# Patient Record
Sex: FEMALE | Race: WHITE | NOT HISPANIC OR LATINO | Employment: UNEMPLOYED | URBAN - METROPOLITAN AREA
[De-identification: names, ages, dates, MRNs, and addresses within clinical notes are randomized per-mention and may not be internally consistent; named-entity substitution may affect disease eponyms.]

---

## 2021-10-05 DIAGNOSIS — Z12.31 ENCOUNTER FOR SCREENING MAMMOGRAM FOR MALIGNANT NEOPLASM OF BREAST: Primary | ICD-10-CM

## 2021-10-07 ENCOUNTER — TELEPHONE (OUTPATIENT)
Dept: GASTROENTEROLOGY | Facility: CLINIC | Age: 66
End: 2021-10-07

## 2021-10-27 ENCOUNTER — HOSPITAL ENCOUNTER (INPATIENT)
Facility: HOSPITAL | Age: 66
LOS: 1 days | Discharge: LEFT BEFORE DISCHARGE | DRG: 101 | End: 2021-10-28
Attending: EMERGENCY MEDICINE | Admitting: INTERNAL MEDICINE
Payer: OTHER GOVERNMENT

## 2021-10-27 DIAGNOSIS — G93.40 ENCEPHALOPATHY ACUTE: ICD-10-CM

## 2021-10-27 DIAGNOSIS — G93.40 ENCEPHALOPATHY: ICD-10-CM

## 2021-10-27 DIAGNOSIS — R41.0 ACUTE DELIRIUM: Primary | ICD-10-CM

## 2021-10-27 LAB
ALBUMIN SERPL BCP-MCNC: 3.9 G/DL (ref 3.5–5.2)
ALP SERPL-CCNC: 74 U/L (ref 55–135)
ALT SERPL W/O P-5'-P-CCNC: 22 U/L (ref 10–44)
AMMONIA PLAS-SCNC: 20 UMOL/L (ref 10–50)
ANION GAP SERPL CALC-SCNC: 11 MMOL/L (ref 8–16)
AST SERPL-CCNC: 27 U/L (ref 10–40)
BASOPHILS # BLD AUTO: 0.07 K/UL (ref 0–0.2)
BASOPHILS NFR BLD: 0.9 % (ref 0–1.9)
BILIRUB SERPL-MCNC: 0.2 MG/DL (ref 0.1–1)
BUN SERPL-MCNC: 16 MG/DL (ref 8–23)
CALCIUM SERPL-MCNC: 9.1 MG/DL (ref 8.7–10.5)
CHLORIDE SERPL-SCNC: 96 MMOL/L (ref 95–110)
CO2 SERPL-SCNC: 22 MMOL/L (ref 23–29)
CREAT SERPL-MCNC: 0.6 MG/DL (ref 0.5–1.4)
DIFFERENTIAL METHOD: ABNORMAL
EOSINOPHIL # BLD AUTO: 0.1 K/UL (ref 0–0.5)
EOSINOPHIL NFR BLD: 1.8 % (ref 0–8)
ERYTHROCYTE [DISTWIDTH] IN BLOOD BY AUTOMATED COUNT: 13.3 % (ref 11.5–14.5)
EST. GFR  (AFRICAN AMERICAN): >60 ML/MIN/1.73 M^2
EST. GFR  (NON AFRICAN AMERICAN): >60 ML/MIN/1.73 M^2
ETHANOL SERPL-MCNC: <10 MG/DL
GLUCOSE SERPL-MCNC: 97 MG/DL (ref 70–110)
HCT VFR BLD AUTO: 35.3 % (ref 37–48.5)
HGB BLD-MCNC: 11.3 G/DL (ref 12–16)
IMM GRANULOCYTES # BLD AUTO: 0.02 K/UL (ref 0–0.04)
IMM GRANULOCYTES NFR BLD AUTO: 0.3 % (ref 0–0.5)
LYMPHOCYTES # BLD AUTO: 1 K/UL (ref 1–4.8)
LYMPHOCYTES NFR BLD: 12.5 % (ref 18–48)
MCH RBC QN AUTO: 30 PG (ref 27–31)
MCHC RBC AUTO-ENTMCNC: 32 G/DL (ref 32–36)
MCV RBC AUTO: 94 FL (ref 82–98)
MONOCYTES # BLD AUTO: 0.5 K/UL (ref 0.3–1)
MONOCYTES NFR BLD: 6.9 % (ref 4–15)
NEUTROPHILS # BLD AUTO: 5.9 K/UL (ref 1.8–7.7)
NEUTROPHILS NFR BLD: 77.6 % (ref 38–73)
NRBC BLD-RTO: 0 /100 WBC
PLATELET # BLD AUTO: 374 K/UL (ref 150–450)
PMV BLD AUTO: 8.6 FL (ref 9.2–12.9)
POTASSIUM SERPL-SCNC: 4.3 MMOL/L (ref 3.5–5.1)
PROT SERPL-MCNC: 7 G/DL (ref 6–8.4)
RBC # BLD AUTO: 3.77 M/UL (ref 4–5.4)
SODIUM SERPL-SCNC: 129 MMOL/L (ref 136–145)
WBC # BLD AUTO: 7.66 K/UL (ref 3.9–12.7)

## 2021-10-27 PROCEDURE — 81003 URINALYSIS AUTO W/O SCOPE: CPT | Mod: 59 | Performed by: EMERGENCY MEDICINE

## 2021-10-27 PROCEDURE — 36415 COLL VENOUS BLD VENIPUNCTURE: CPT | Performed by: EMERGENCY MEDICINE

## 2021-10-27 PROCEDURE — 80307 DRUG TEST PRSMV CHEM ANLYZR: CPT | Performed by: EMERGENCY MEDICINE

## 2021-10-27 PROCEDURE — 82140 ASSAY OF AMMONIA: CPT | Performed by: EMERGENCY MEDICINE

## 2021-10-27 PROCEDURE — 82077 ASSAY SPEC XCP UR&BREATH IA: CPT | Performed by: EMERGENCY MEDICINE

## 2021-10-27 PROCEDURE — 80053 COMPREHEN METABOLIC PANEL: CPT | Performed by: EMERGENCY MEDICINE

## 2021-10-27 PROCEDURE — 99285 EMERGENCY DEPT VISIT HI MDM: CPT | Mod: 25

## 2021-10-27 PROCEDURE — 96374 THER/PROPH/DIAG INJ IV PUSH: CPT

## 2021-10-27 PROCEDURE — 85025 COMPLETE CBC W/AUTO DIFF WBC: CPT | Performed by: EMERGENCY MEDICINE

## 2021-10-27 PROCEDURE — 63600175 PHARM REV CODE 636 W HCPCS: Performed by: EMERGENCY MEDICINE

## 2021-10-27 RX ORDER — LORAZEPAM 2 MG/ML
0.5 INJECTION INTRAMUSCULAR
Status: COMPLETED | OUTPATIENT
Start: 2021-10-27 | End: 2021-10-27

## 2021-10-27 RX ADMIN — LORAZEPAM 0.5 MG: 2 INJECTION INTRAMUSCULAR; INTRAVENOUS at 11:10

## 2021-10-28 VITALS
SYSTOLIC BLOOD PRESSURE: 111 MMHG | OXYGEN SATURATION: 93 % | TEMPERATURE: 97 F | WEIGHT: 101.88 LBS | BODY MASS INDEX: 20 KG/M2 | HEIGHT: 60 IN | DIASTOLIC BLOOD PRESSURE: 71 MMHG | HEART RATE: 76 BPM | RESPIRATION RATE: 18 BRPM

## 2021-10-28 PROBLEM — G93.40 ENCEPHALOPATHY ACUTE: Status: ACTIVE | Noted: 2021-10-28

## 2021-10-28 PROBLEM — E87.1 HYPONATREMIA: Status: ACTIVE | Noted: 2021-10-28

## 2021-10-28 LAB
ALBUMIN SERPL BCP-MCNC: 3.4 G/DL (ref 3.5–5.2)
ALP SERPL-CCNC: 63 U/L (ref 55–135)
ALT SERPL W/O P-5'-P-CCNC: 19 U/L (ref 10–44)
AMMONIA PLAS-SCNC: 28 UMOL/L (ref 10–50)
AMPHET+METHAMPHET UR QL: NEGATIVE
ANION GAP SERPL CALC-SCNC: 9 MMOL/L (ref 8–16)
AST SERPL-CCNC: 23 U/L (ref 10–40)
BARBITURATES UR QL SCN>200 NG/ML: NEGATIVE
BASOPHILS # BLD AUTO: 0.06 K/UL (ref 0–0.2)
BASOPHILS NFR BLD: 0.9 % (ref 0–1.9)
BENZODIAZ UR QL SCN>200 NG/ML: NEGATIVE
BILIRUB SERPL-MCNC: 0.2 MG/DL (ref 0.1–1)
BILIRUB UR QL STRIP: NEGATIVE
BUN SERPL-MCNC: 11 MG/DL (ref 8–23)
BZE UR QL SCN: NEGATIVE
CALCIUM SERPL-MCNC: 8.5 MG/DL (ref 8.7–10.5)
CANNABINOIDS UR QL SCN: NEGATIVE
CHLORIDE SERPL-SCNC: 97 MMOL/L (ref 95–110)
CLARITY UR: CLEAR
CO2 SERPL-SCNC: 22 MMOL/L (ref 23–29)
COLOR UR: YELLOW
CREAT SERPL-MCNC: 0.6 MG/DL (ref 0.5–1.4)
CREAT UR-MCNC: 33 MG/DL (ref 15–325)
DIFFERENTIAL METHOD: ABNORMAL
EOSINOPHIL # BLD AUTO: 0.1 K/UL (ref 0–0.5)
EOSINOPHIL NFR BLD: 1.4 % (ref 0–8)
ERYTHROCYTE [DISTWIDTH] IN BLOOD BY AUTOMATED COUNT: 13.2 % (ref 11.5–14.5)
EST. GFR  (AFRICAN AMERICAN): >60 ML/MIN/1.73 M^2
EST. GFR  (NON AFRICAN AMERICAN): >60 ML/MIN/1.73 M^2
GLUCOSE SERPL-MCNC: 91 MG/DL (ref 70–110)
GLUCOSE UR QL STRIP: NEGATIVE
HCT VFR BLD AUTO: 31.3 % (ref 37–48.5)
HGB BLD-MCNC: 10.2 G/DL (ref 12–16)
HGB UR QL STRIP: ABNORMAL
IMM GRANULOCYTES # BLD AUTO: 0.03 K/UL (ref 0–0.04)
IMM GRANULOCYTES NFR BLD AUTO: 0.4 % (ref 0–0.5)
KETONES UR QL STRIP: NEGATIVE
LEUKOCYTE ESTERASE UR QL STRIP: NEGATIVE
LYMPHOCYTES # BLD AUTO: 1.1 K/UL (ref 1–4.8)
LYMPHOCYTES NFR BLD: 16.1 % (ref 18–48)
MAGNESIUM SERPL-MCNC: 1.8 MG/DL (ref 1.6–2.6)
MCH RBC QN AUTO: 29.7 PG (ref 27–31)
MCHC RBC AUTO-ENTMCNC: 32.6 G/DL (ref 32–36)
MCV RBC AUTO: 91 FL (ref 82–98)
METHADONE UR QL SCN>300 NG/ML: NEGATIVE
MONOCYTES # BLD AUTO: 0.5 K/UL (ref 0.3–1)
MONOCYTES NFR BLD: 6.5 % (ref 4–15)
NEUTROPHILS # BLD AUTO: 5.3 K/UL (ref 1.8–7.7)
NEUTROPHILS NFR BLD: 74.7 % (ref 38–73)
NITRITE UR QL STRIP: NEGATIVE
NRBC BLD-RTO: 0 /100 WBC
OPIATES UR QL SCN: NEGATIVE
PCP UR QL SCN>25 NG/ML: NEGATIVE
PH UR STRIP: 7 [PH] (ref 5–8)
PHOSPHATE SERPL-MCNC: 3.1 MG/DL (ref 2.7–4.5)
PLATELET # BLD AUTO: 346 K/UL (ref 150–450)
PMV BLD AUTO: 8.8 FL (ref 9.2–12.9)
POTASSIUM SERPL-SCNC: 3.6 MMOL/L (ref 3.5–5.1)
PROT SERPL-MCNC: 6 G/DL (ref 6–8.4)
PROT UR QL STRIP: NEGATIVE
RBC # BLD AUTO: 3.43 M/UL (ref 4–5.4)
SARS-COV-2 RDRP RESP QL NAA+PROBE: NEGATIVE
SODIUM SERPL-SCNC: 128 MMOL/L (ref 136–145)
SP GR UR STRIP: 1.02 (ref 1–1.03)
TOXICOLOGY INFORMATION: NORMAL
TSH SERPL DL<=0.005 MIU/L-ACNC: 1.38 UIU/ML (ref 0.4–4)
URN SPEC COLLECT METH UR: ABNORMAL
UROBILINOGEN UR STRIP-ACNC: NEGATIVE EU/DL
VIT B12 SERPL-MCNC: 956 PG/ML (ref 210–950)
WBC # BLD AUTO: 7.04 K/UL (ref 3.9–12.7)

## 2021-10-28 PROCEDURE — 97161 PT EVAL LOW COMPLEX 20 MIN: CPT

## 2021-10-28 PROCEDURE — 25000003 PHARM REV CODE 250: Performed by: INTERNAL MEDICINE

## 2021-10-28 PROCEDURE — 95819 EEG AWAKE AND ASLEEP: CPT

## 2021-10-28 PROCEDURE — 97165 OT EVAL LOW COMPLEX 30 MIN: CPT

## 2021-10-28 PROCEDURE — 84100 ASSAY OF PHOSPHORUS: CPT | Performed by: NURSE PRACTITIONER

## 2021-10-28 PROCEDURE — 84425 ASSAY OF VITAMIN B-1: CPT | Performed by: INTERNAL MEDICINE

## 2021-10-28 PROCEDURE — 63600175 PHARM REV CODE 636 W HCPCS: Performed by: NURSE PRACTITIONER

## 2021-10-28 PROCEDURE — 36415 COLL VENOUS BLD VENIPUNCTURE: CPT | Performed by: NURSE PRACTITIONER

## 2021-10-28 PROCEDURE — 25000003 PHARM REV CODE 250: Performed by: NURSE PRACTITIONER

## 2021-10-28 PROCEDURE — 95816 EEG AWAKE AND DROWSY: CPT | Mod: 26,,, | Performed by: PSYCHIATRY & NEUROLOGY

## 2021-10-28 PROCEDURE — 95816 PR EEG,W/AWAKE & DROWSY RECORD: ICD-10-PCS | Mod: 26,,, | Performed by: PSYCHIATRY & NEUROLOGY

## 2021-10-28 PROCEDURE — 25500020 PHARM REV CODE 255: Performed by: INTERNAL MEDICINE

## 2021-10-28 PROCEDURE — 82140 ASSAY OF AMMONIA: CPT | Performed by: INTERNAL MEDICINE

## 2021-10-28 PROCEDURE — 97535 SELF CARE MNGMENT TRAINING: CPT

## 2021-10-28 PROCEDURE — 83735 ASSAY OF MAGNESIUM: CPT | Performed by: NURSE PRACTITIONER

## 2021-10-28 PROCEDURE — 80053 COMPREHEN METABOLIC PANEL: CPT | Performed by: NURSE PRACTITIONER

## 2021-10-28 PROCEDURE — 82607 VITAMIN B-12: CPT | Performed by: INTERNAL MEDICINE

## 2021-10-28 PROCEDURE — 85025 COMPLETE CBC W/AUTO DIFF WBC: CPT | Performed by: NURSE PRACTITIONER

## 2021-10-28 PROCEDURE — 11000001 HC ACUTE MED/SURG PRIVATE ROOM

## 2021-10-28 PROCEDURE — A9585 GADOBUTROL INJECTION: HCPCS | Performed by: INTERNAL MEDICINE

## 2021-10-28 PROCEDURE — 84443 ASSAY THYROID STIM HORMONE: CPT | Performed by: INTERNAL MEDICINE

## 2021-10-28 PROCEDURE — U0002 COVID-19 LAB TEST NON-CDC: HCPCS | Performed by: EMERGENCY MEDICINE

## 2021-10-28 RX ORDER — TALC
9 POWDER (GRAM) TOPICAL NIGHTLY PRN
Status: DISCONTINUED | OUTPATIENT
Start: 2021-10-28 | End: 2021-10-28 | Stop reason: HOSPADM

## 2021-10-28 RX ORDER — OMEPRAZOLE 40 MG/1
CAPSULE, DELAYED RELEASE ORAL
COMMUNITY

## 2021-10-28 RX ORDER — ACETAMINOPHEN 325 MG/1
650 TABLET ORAL EVERY 6 HOURS PRN
Status: DISCONTINUED | OUTPATIENT
Start: 2021-10-28 | End: 2021-10-28 | Stop reason: HOSPADM

## 2021-10-28 RX ORDER — CELECOXIB 200 MG/1
CAPSULE ORAL
COMMUNITY
End: 2023-05-02

## 2021-10-28 RX ORDER — ONDANSETRON 2 MG/ML
4 INJECTION INTRAMUSCULAR; INTRAVENOUS EVERY 8 HOURS PRN
Status: DISCONTINUED | OUTPATIENT
Start: 2021-10-28 | End: 2021-10-28 | Stop reason: HOSPADM

## 2021-10-28 RX ORDER — POLYETHYLENE GLYCOL 3350 17 G/17G
POWDER, FOR SOLUTION ORAL
COMMUNITY

## 2021-10-28 RX ORDER — GLUCAGON 1 MG
1 KIT INJECTION
Status: DISCONTINUED | OUTPATIENT
Start: 2021-10-28 | End: 2021-10-28 | Stop reason: HOSPADM

## 2021-10-28 RX ORDER — ENOXAPARIN SODIUM 100 MG/ML
40 INJECTION SUBCUTANEOUS EVERY 24 HOURS
Status: DISCONTINUED | OUTPATIENT
Start: 2021-10-28 | End: 2021-10-28 | Stop reason: HOSPADM

## 2021-10-28 RX ORDER — IBUPROFEN 200 MG
24 TABLET ORAL
Status: DISCONTINUED | OUTPATIENT
Start: 2021-10-28 | End: 2021-10-28 | Stop reason: HOSPADM

## 2021-10-28 RX ORDER — LANOLIN ALCOHOL/MO/W.PET/CERES
800 CREAM (GRAM) TOPICAL
Status: DISCONTINUED | OUTPATIENT
Start: 2021-10-28 | End: 2021-10-28 | Stop reason: HOSPADM

## 2021-10-28 RX ORDER — BUPROPION HYDROCHLORIDE 150 MG/1
TABLET, EXTENDED RELEASE ORAL
COMMUNITY
End: 2022-01-26

## 2021-10-28 RX ORDER — SODIUM CHLORIDE 0.9 % (FLUSH) 0.9 %
10 SYRINGE (ML) INJECTION EVERY 12 HOURS PRN
Status: DISCONTINUED | OUTPATIENT
Start: 2021-10-28 | End: 2021-10-28 | Stop reason: HOSPADM

## 2021-10-28 RX ORDER — IBUPROFEN 200 MG
16 TABLET ORAL
Status: DISCONTINUED | OUTPATIENT
Start: 2021-10-28 | End: 2021-10-28 | Stop reason: HOSPADM

## 2021-10-28 RX ORDER — GABAPENTIN 300 MG/1
CAPSULE ORAL
COMMUNITY
End: 2023-05-02

## 2021-10-28 RX ORDER — ACETAMINOPHEN 325 MG/1
650 TABLET ORAL EVERY 4 HOURS PRN
Status: DISCONTINUED | OUTPATIENT
Start: 2021-10-28 | End: 2021-10-28 | Stop reason: HOSPADM

## 2021-10-28 RX ORDER — FERROUS SULFATE 325(65) MG
TABLET ORAL
COMMUNITY

## 2021-10-28 RX ORDER — BISACODYL 5 MG
TABLET, DELAYED RELEASE (ENTERIC COATED) ORAL
COMMUNITY

## 2021-10-28 RX ORDER — THIAMINE HCL 100 MG
100 TABLET ORAL DAILY
Status: DISCONTINUED | OUTPATIENT
Start: 2021-10-28 | End: 2021-10-28 | Stop reason: HOSPADM

## 2021-10-28 RX ORDER — SODIUM CHLORIDE 9 MG/ML
INJECTION, SOLUTION INTRAVENOUS ONCE
Status: COMPLETED | OUTPATIENT
Start: 2021-10-28 | End: 2021-10-28

## 2021-10-28 RX ORDER — GADOBUTROL 604.72 MG/ML
4 INJECTION INTRAVENOUS
Status: COMPLETED | OUTPATIENT
Start: 2021-10-28 | End: 2021-10-28

## 2021-10-28 RX ORDER — OMEPRAZOLE 20 MG/1
CAPSULE, DELAYED RELEASE ORAL
COMMUNITY
End: 2022-01-19

## 2021-10-28 RX ORDER — NALOXONE HCL 0.4 MG/ML
0.02 VIAL (ML) INJECTION
Status: DISCONTINUED | OUTPATIENT
Start: 2021-10-28 | End: 2021-10-28 | Stop reason: HOSPADM

## 2021-10-28 RX ADMIN — ACETAMINOPHEN 650 MG: 325 TABLET ORAL at 01:10

## 2021-10-28 RX ADMIN — SODIUM CHLORIDE: 0.9 INJECTION, SOLUTION INTRAVENOUS at 03:10

## 2021-10-28 RX ADMIN — ENOXAPARIN SODIUM 40 MG: 40 INJECTION, SOLUTION INTRAVENOUS; SUBCUTANEOUS at 03:10

## 2021-10-28 RX ADMIN — GADOBUTROL 4 ML: 604.72 INJECTION INTRAVENOUS at 03:10

## 2021-10-28 RX ADMIN — THIAMINE HCL TAB 100 MG 100 MG: 100 TAB at 10:10

## 2021-10-29 ENCOUNTER — HOSPITAL ENCOUNTER (EMERGENCY)
Facility: HOSPITAL | Age: 66
Discharge: HOME | End: 2021-10-29
Attending: EMERGENCY MEDICINE
Payer: OTHER GOVERNMENT

## 2021-10-29 VITALS
DIASTOLIC BLOOD PRESSURE: 83 MMHG | HEIGHT: 60 IN | TEMPERATURE: 99 F | WEIGHT: 100 LBS | OXYGEN SATURATION: 97 % | RESPIRATION RATE: 20 BRPM | BODY MASS INDEX: 19.63 KG/M2 | SYSTOLIC BLOOD PRESSURE: 118 MMHG | HEART RATE: 102 BPM

## 2021-10-29 DIAGNOSIS — Z71.2 ENCOUNTER TO DISCUSS TEST RESULTS: Primary | ICD-10-CM

## 2021-10-29 PROCEDURE — 99284 EMERGENCY DEPT VISIT MOD MDM: CPT

## 2021-11-03 LAB — VIT B1 BLD-MCNC: 86 UG/L (ref 38–122)

## 2021-11-22 ENCOUNTER — TELEPHONE (OUTPATIENT)
Dept: GASTROENTEROLOGY | Facility: CLINIC | Age: 66
End: 2021-11-22
Payer: OTHER GOVERNMENT

## 2021-11-29 ENCOUNTER — TELEPHONE (OUTPATIENT)
Dept: NEUROLOGY | Facility: CLINIC | Age: 66
End: 2021-11-29
Payer: OTHER GOVERNMENT

## 2021-12-06 ENCOUNTER — TELEPHONE (OUTPATIENT)
Dept: NEUROLOGY | Facility: CLINIC | Age: 66
End: 2021-12-06
Payer: OTHER GOVERNMENT

## 2021-12-09 ENCOUNTER — HOSPITAL ENCOUNTER (OUTPATIENT)
Dept: RADIOLOGY | Facility: CLINIC | Age: 66
Discharge: HOME | End: 2021-12-09
Attending: FAMILY MEDICINE
Payer: OTHER GOVERNMENT

## 2021-12-09 DIAGNOSIS — Z12.31 ENCOUNTER FOR SCREENING MAMMOGRAM FOR MALIGNANT NEOPLASM OF BREAST: ICD-10-CM

## 2021-12-09 PROCEDURE — 77067 SCR MAMMO BI INCL CAD: CPT | Mod: 26,,, | Performed by: RADIOLOGY

## 2021-12-09 PROCEDURE — 77067 MAMMO DIGITAL SCREENING BILAT WITH TOMO: ICD-10-PCS | Mod: 26,,, | Performed by: RADIOLOGY

## 2021-12-09 PROCEDURE — 77067 SCR MAMMO BI INCL CAD: CPT | Mod: TC,PO

## 2021-12-09 PROCEDURE — 77063 BREAST TOMOSYNTHESIS BI: CPT | Mod: 26,,, | Performed by: RADIOLOGY

## 2021-12-09 PROCEDURE — 77063 MAMMO DIGITAL SCREENING BILAT WITH TOMO: ICD-10-PCS | Mod: 26,,, | Performed by: RADIOLOGY

## 2022-01-19 ENCOUNTER — OFFICE VISIT (OUTPATIENT)
Dept: GASTROENTEROLOGY | Facility: CLINIC | Age: 67
End: 2022-01-19
Payer: OTHER GOVERNMENT

## 2022-01-19 VITALS — BODY MASS INDEX: 19.91 KG/M2 | HEIGHT: 60 IN | WEIGHT: 101.44 LBS

## 2022-01-19 DIAGNOSIS — Z01.818 PREOP TESTING: ICD-10-CM

## 2022-01-19 DIAGNOSIS — Z87.19 HISTORY OF BARRETT'S ESOPHAGUS: ICD-10-CM

## 2022-01-19 DIAGNOSIS — Z87.19 HISTORY OF HIATAL HERNIA: ICD-10-CM

## 2022-01-19 DIAGNOSIS — K21.9 GASTROESOPHAGEAL REFLUX DISEASE, UNSPECIFIED WHETHER ESOPHAGITIS PRESENT: Primary | ICD-10-CM

## 2022-01-19 DIAGNOSIS — R13.10 DYSPHAGIA, UNSPECIFIED TYPE: ICD-10-CM

## 2022-01-19 DIAGNOSIS — D64.9 ANEMIA, UNSPECIFIED TYPE: ICD-10-CM

## 2022-01-19 DIAGNOSIS — Z86.010 HISTORY OF COLON POLYPS: ICD-10-CM

## 2022-01-19 PROCEDURE — 99999 PR PBB SHADOW E&M-EST. PATIENT-LVL III: CPT | Mod: PBBFAC,,,

## 2022-01-19 PROCEDURE — 99999 PR PBB SHADOW E&M-EST. PATIENT-LVL III: ICD-10-PCS | Mod: PBBFAC,,,

## 2022-01-19 PROCEDURE — 99213 OFFICE O/P EST LOW 20 MIN: CPT | Mod: PBBFAC,PN

## 2022-01-19 PROCEDURE — 99204 PR OFFICE/OUTPT VISIT, NEW, LEVL IV, 45-59 MIN: ICD-10-PCS | Mod: S$PBB,,,

## 2022-01-19 PROCEDURE — 99204 OFFICE O/P NEW MOD 45 MIN: CPT | Mod: S$PBB,,,

## 2022-01-19 RX ORDER — ASPIRIN 81 MG/1
81 TABLET ORAL DAILY
COMMUNITY

## 2022-01-19 NOTE — PATIENT INSTRUCTIONS
"Patient Education       Acid Reflux and Gastroesophageal Reflux Disease in Adults   The Basics   Written by the doctors and editors at [facility]   What is acid reflux? -- Acid reflux is when the acid that is normally in your stomach backs up into the esophagus. The esophagus is the tube that carries food from your mouth to your stomach (figure 1).  When acid reflux causes bothersome symptoms or damage, doctors call it "gastroesophageal reflux disease" or "GERD."  What are the symptoms of acid reflux? -- The most common symptoms are:  · Heartburn, which is a burning feeling in the chest  · Regurgitation, which is when acid and undigested food flow back into your throat or mouth   Other symptoms might include:  · Stomach or chest pain  · Trouble swallowing  · Having a raspy voice or a sore throat  · Unexplained cough  · Nausea or vomiting  Is there anything I can do on my own to feel better? -- Yes. You might feel better if you:  · Lose weight (if you are overweight)  · Raise the head of your bed by 6 to 8 inches - You can do this by putting blocks of wood or rubber under 2 legs of the bed or a foam wedge under the mattress.  · Avoid foods that make your symptoms worse - For some people these include coffee, chocolate, alcohol, peppermint, and fatty foods.  · Stop smoking, if you smoke  · Avoid late meals - Lying down with a full stomach can make reflux worse. Try to plan meals for at least 2 to 3 hours before bedtime.  · Avoid tight clothing - Some people feel better if they wear comfortable clothing that does not squeeze the stomach area.   How is acid reflux treated? -- There are a few main types of medicines that can help with the symptoms of acid reflux. The most common are antacids, histamine blockers, and proton pump inhibitors (table 1). All of these medicines work by reducing or blocking stomach acid. But they each do that in a different way.  · For mild symptoms, antacids can help, but they work only for a " "short time. Histamine blockers are stronger and last longer than antacids. You can buy antacids and most histamine blockers without a prescription.  · For frequent and more severe symptoms, proton pump inhibitors are the most effective medicines. Some of these medicines are sold without a prescription. But there are other versions that your doctor can prescribe.  Sometimes, medicines cost less if you get them with a doctor's prescription. Other times, non-prescription medicines cost less. If you are worried about cost, ask your pharmacist about ways to pay less for your medicines.  Should I see a doctor or nurse about my acid reflux? -- Some people can manage their acid reflux on their own by changing their habits or taking non-prescription medicines. But you should see a doctor or nurse if:  · Your symptoms are severe or last a long time  · You cannot seem to control your symptoms  · You have had symptoms for many years  You should also see a doctor or nurse right away if you:  · Have trouble swallowing, or feel as though food gets "stuck" on the way down  · Lose weight when you are not trying to  · Have chest pain  · Choke when you eat  · Vomit blood or have bowel movements that are red, black, or look like tar  What if my child or teenager has acid reflux? -- If your child or teenager has acid reflux, take him or her to see a doctor or nurse. Do not give your child medicines to treat acid reflux without talking to a doctor or nurse.  In children, acid reflux can be caused by a number of problems. It's important to have a doctor or nurse check for these problems before trying any treatments.  All topics are updated as new evidence becomes available and our peer review process is complete.  This topic retrieved from [employer] on: Sep 21, 2021.  Topic 58619 Version 11.0  Release: 29.4.2 - C29.263  © 2021 UpToDate, Inc. and/or its affiliates. All rights reserved.  figure 1: Upper digestive tract     The upper " digestive tract includes the esophagus (the tube that connects the mouth to the stomach), the stomach, and the duodenum (the first part of the small intestine).  Graphic 64312 Version 6.0    table 1: Medicines used to reduce stomach acid  Medicine type  Medicine name examples    Antacids* Calcium carbonate (sample brand names: Maalox, Tums)    Aluminum hydroxide, magnesium hydroxide, and simethicone (sample brand name: Mylanta)   Surface agents Sucralfate (brand name: Carafate)   Histamine blockers¶  Famotidine (brand name: Pepcid)    Cimetidine (brand name: Tagamet)   Proton pump inhibitors Omeprazole (brand name: Prilosec)    Esomeprazole (brand name: Nexium)    Pantoprazole (brand name: Protonix)    Lansoprazole (brand name: Prevacid)    Dexlansoprazole (brand name: Dexilant)    Rabeprazole (brand name: AcipHex)   Graphic 04585 Version 14.0  Consumer Information Use and Disclaimer   This information is not specific medical advice and does not replace information you receive from your health care provider. This is only a brief summary of general information. It does NOT include all information about conditions, illnesses, injuries, tests, procedures, treatments, therapies, discharge instructions or life-style choices that may apply to you. You must talk with your health care provider for complete information about your health and treatment options. This information should not be used to decide whether or not to accept your health care provider's advice, instructions or recommendations. Only your health care provider has the knowledge and training to provide advice that is right for you. The use of this information is governed by the [employer] End User License Agreement, available at https://[email or web address]iar[employer]/en/solutions/Symcircle/about/cindy.The use of [employer] content is governed by the [employer] Terms of Use. ©2021 UpToDate, Inc. All rights reserved.  Copyright   © 2021 UpToDate, Inc. and/or its affiliates. All  rights reserved.

## 2022-01-19 NOTE — PROGRESS NOTES
Subjective:       Patient ID: Saira Osuna is a 66 y.o. female Body mass index is 19.81 kg/m².    Chief Complaint: Gastroesophageal Reflux    This patient is new to me.  Referring Provider: [facility]* for gastro-esophageal reflux disease.     Gastroesophageal Reflux  She complains of dysphagia (worse with salad and bread), globus sensation and heartburn. She reports no abdominal pain, no belching, no chest pain, no choking, no coughing, no early satiety, no hoarse voice, no nausea, no sore throat, no stridor, no water brash or no wheezing. This is a chronic (started ~1976) problem. The current episode started more than 1 year ago (worsened since 2020). The problem occurs frequently. The problem has been gradually worsening. The heartburn duration is several minutes (reports heartburn is well controlled on omeprazole). The heartburn is located in the substernum. The heartburn is of severe intensity. The heartburn does not wake her from sleep. The heartburn limits her activity. The heartburn doesn't change with position. The symptoms are aggravated by certain foods (worse with lemonaid). Associated symptoms include anemia and fatigue. Pertinent negatives include no melena, muscle weakness, orthopnea or weight loss. Reports using 2L oxygen at night. Risk factors include hiatal hernia, Dixon's esophagus, caffeine use and NSAIDs (currently taking Celebrex 200 mg BID). She has tried a PPI (Currently taking omeprazole 40 mg once daily) for the symptoms. The treatment provided significant relief. Past procedures include an EGD (per patient report last EGD ~2021). Past procedures do not include an abdominal ultrasound, esophageal manometry, esophageal pH monitoring, H. pylori antibody titer or a UGI. Past invasive treatments do not include gastroplasty, gastroplication or reflux surgery.     Review of Systems   Constitutional: Positive for fatigue. Negative for activity change, appetite change, chills,  diaphoresis, fever, unexpected weight change and weight loss.   HENT: Positive for trouble swallowing. Negative for hoarse voice and sore throat.    Respiratory: Negative for cough, choking, shortness of breath and wheezing.    Cardiovascular: Negative for chest pain.   Gastrointestinal: Positive for dysphagia (worse with salad and bread) and heartburn. Negative for abdominal distention, abdominal pain, anal bleeding, blood in stool, constipation, diarrhea, melena, nausea, rectal pain and vomiting.   Musculoskeletal: Negative for muscle weakness.       No LMP recorded. Patient is postmenopausal.  History reviewed. No pertinent past medical history.  Past Surgical History:   Procedure Laterality Date    AUGMENTATION OF BREAST      COLONOSCOPY      UPPER GASTROINTESTINAL ENDOSCOPY       Family History   Problem Relation Age of Onset    Breast cancer Maternal Aunt     Colon cancer Neg Hx     Colon polyps Neg Hx     Crohn's disease Neg Hx     Ulcerative colitis Neg Hx     Stomach cancer Neg Hx     Rectal cancer Neg Hx      Social History     Tobacco Use    Smoking status: Former Smoker     Quit date: 1990     Years since quittin.0    Smokeless tobacco: Never Used   Substance Use Topics    Alcohol use: Yes     Comment: rarely     Wt Readings from Last 10 Encounters:   22 46 kg (101 lb 6.6 oz)   10/29/21 45.4 kg (100 lb)   10/28/21 46.2 kg (101 lb 13.6 oz)     Lab Results   Component Value Date    WBC 7.04 10/28/2021    HGB 10.2 (L) 10/28/2021    HCT 31.3 (L) 10/28/2021    MCV 91 10/28/2021     10/28/2021     CMP  Sodium   Date Value Ref Range Status   10/28/2021 128 (L) 136 - 145 mmol/L Final     Potassium   Date Value Ref Range Status   10/28/2021 3.6 3.5 - 5.1 mmol/L Final     Chloride   Date Value Ref Range Status   10/28/2021 97 95 - 110 mmol/L Final     CO2   Date Value Ref Range Status   10/28/2021 22 (L) 23 - 29 mmol/L Final     Glucose   Date Value Ref Range Status   10/28/2021  91 70 - 110 mg/dL Final     BUN   Date Value Ref Range Status   10/28/2021 11 8 - 23 mg/dL Final     Creatinine   Date Value Ref Range Status   10/28/2021 0.6 0.5 - 1.4 mg/dL Final     Calcium   Date Value Ref Range Status   10/28/2021 8.5 (L) 8.7 - 10.5 mg/dL Final     Total Protein   Date Value Ref Range Status   10/28/2021 6.0 6.0 - 8.4 g/dL Final     Albumin   Date Value Ref Range Status   10/28/2021 3.4 (L) 3.5 - 5.2 g/dL Final     Total Bilirubin   Date Value Ref Range Status   10/28/2021 0.2 0.1 - 1.0 mg/dL Final     Comment:     For infants and newborns, interpretation of results should be based  on gestational age, weight and in agreement with clinical  observations.    Premature Infant recommended reference ranges:  Up to 24 hours.............<8.0 mg/dL  Up to 48 hours............<12.0 mg/dL  3-5 days..................<15.0 mg/dL  6-29 days.................<15.0 mg/dL       Alkaline Phosphatase   Date Value Ref Range Status   10/28/2021 63 55 - 135 U/L Final     AST   Date Value Ref Range Status   10/28/2021 23 10 - 40 U/L Final     ALT   Date Value Ref Range Status   10/28/2021 19 10 - 44 U/L Final     Anion Gap   Date Value Ref Range Status   10/28/2021 9 8 - 16 mmol/L Final     eGFR if    Date Value Ref Range Status   10/28/2021 >60 >60 mL/min/1.73 m^2 Final     eGFR if non    Date Value Ref Range Status   10/28/2021 >60 >60 mL/min/1.73 m^2 Final     Comment:     Calculation used to obtain the estimated glomerular filtration  rate (eGFR) is the CKD-EPI equation.        Lab Results   Component Value Date    TSH 1.379 10/28/2021       Objective:      Physical Exam  Vitals and nursing note reviewed.   Constitutional:       General: She is not in acute distress.     Appearance: Normal appearance. She is not ill-appearing.   HENT:      Mouth/Throat:      Comments: Unable to assess due to COVID-19 concerns.  Eyes:      Extraocular Movements: Extraocular movements intact.       Pupils: Pupils are equal, round, and reactive to light.   Cardiovascular:      Rate and Rhythm: Normal rate and regular rhythm.      Heart sounds: Normal heart sounds.   Pulmonary:      Effort: Pulmonary effort is normal. No respiratory distress.      Breath sounds: Normal breath sounds.   Abdominal:      General: Abdomen is flat. Bowel sounds are normal. There is no distension or abdominal bruit. There are no signs of injury.      Palpations: Abdomen is soft. There is no shifting dullness, hepatomegaly or mass.      Tenderness: There is no abdominal tenderness. There is no guarding or rebound. Negative signs include Machado's sign and McBurney's sign.      Hernia: No hernia is present.   Skin:     General: Skin is warm and dry.      Coloration: Skin is not jaundiced.   Neurological:      Mental Status: She is alert and oriented to person, place, and time.   Psychiatric:         Attention and Perception: Attention normal.         Mood and Affect: Mood normal.         Speech: Speech normal.         Behavior: Behavior normal.         Assessment:       1. Gastroesophageal reflux disease, unspecified whether esophagitis present    2. Dysphagia, unspecified type    3. History of hiatal hernia    4. History of Dixon's esophagus    5. History of colon polyps    6. Anemia, unspecified type        Plan:       Gastroesophageal reflux disease, unspecified whether esophagitis present  - schedule EGD, discussed procedure with patient, including risks and benefits, patient verbalized understanding  -discussed about the different types of medications used to treat reflux and how to use them, antacids can be used PRN for breakthrough heartburn symptoms by reducing stomach acid that is already produced, H2 blockers work by limiting the amount acid production, & PPI's work to block acid production and are taken daily, patient verbalized understanding.  -Educated patient on lifestyle modifications to help control/reduce  reflux/abdominal pain including: avoid large meals, avoid eating within 2-3 hours of bedtime (avoid late night eating & lying down soon after eating), elevate head of bed if nocturnal symptoms are present, smoking cessation (if current smoker), & weight loss (if overweight).   -Educated to avoid known foods which trigger reflux symptoms & to minimize/avoid high-fat foods, chocolate, caffeine, citrus, alcohol, & tomato products.  -Advised to avoid/limit use of NSAID's, since they can cause GI upset, bleeding, and/or ulcers. If needed, take with food.   -CONTINUE: omeprazole 40 mg once daily  -Records request for last EGD & EGD pathology report    Dysphagia, unspecified type  - schedule EGD, discussed procedure with patient and possible esophageal dilation may be performed during procedure if indicated, patient verbalized understanding  - educated patient to eat smaller more frequent meals and to eat slowly and advised to eat a soft diet.  - possible UGI/esophagram/esophageal manometry if symptoms persist    History of hiatal hernia  - schedule EGD, discussed procedure with patient, including risks and benefits, patient verbalized understanding  -discussed diagnosis with patient & that it is usually managed by controlling reflux symptoms, surgery is an option, but usually performed if reflux is uncontrolled by medication management and lifestyle/dietary modifications; if symptoms persist despite medication management and lifestyle/dietary modifications, we can refer to general surgery to consult about surgical options, patient verbalized understanding  -CONTINUE: omeprazole 40 mg once daily    History of Dixon's esophagus  - schedule EGD, discussed procedure with patient, including risks and benefits, patient verbalized understanding  -Records request for last EGD & EGD pathology report    History of colon polyps  -Records request for last colonoscopy/patho report    Anemia, unspecified type  -Follow-up with PCP  and/or hematology for continued evaluation and management.    Follow up in about 4 weeks (around 2/16/2022), or if symptoms worsen or fail to improve.      If no improvement in symptoms or symptoms worsen, call/follow-up at clinic or go to ER.        30 minutes of total time spent on the encounter, which includes face to face time and non-face to face time preparing to see the patient (eg, review of tests), Obtaining and/or reviewing separately obtained history, Documenting clinical information in the electronic or other health record, Independently interpreting results (not separately reported) and communicating results to the patient/family/caregiver, or Care coordination (not separately reported).

## 2022-01-19 NOTE — H&P (VIEW-ONLY)
Subjective:       Patient ID: Saira Osuna is a 66 y.o. female Body mass index is 19.81 kg/m².    Chief Complaint: Gastroesophageal Reflux    This patient is new to me.  Referring Provider: [facility]* for gastro-esophageal reflux disease.     Gastroesophageal Reflux  She complains of dysphagia (worse with salad and bread), globus sensation and heartburn. She reports no abdominal pain, no belching, no chest pain, no choking, no coughing, no early satiety, no hoarse voice, no nausea, no sore throat, no stridor, no water brash or no wheezing. This is a chronic (started ~1976) problem. The current episode started more than 1 year ago (worsened since 2020). The problem occurs frequently. The problem has been gradually worsening. The heartburn duration is several minutes (reports heartburn is well controlled on omeprazole). The heartburn is located in the substernum. The heartburn is of severe intensity. The heartburn does not wake her from sleep. The heartburn limits her activity. The heartburn doesn't change with position. The symptoms are aggravated by certain foods (worse with lemonaid). Associated symptoms include anemia and fatigue. Pertinent negatives include no melena, muscle weakness, orthopnea or weight loss. Reports using 2L oxygen at night. Risk factors include hiatal hernia, Dixon's esophagus, caffeine use and NSAIDs (currently taking Celebrex 200 mg BID). She has tried a PPI (Currently taking omeprazole 40 mg once daily) for the symptoms. The treatment provided significant relief. Past procedures include an EGD (per patient report last EGD ~2021). Past procedures do not include an abdominal ultrasound, esophageal manometry, esophageal pH monitoring, H. pylori antibody titer or a UGI. Past invasive treatments do not include gastroplasty, gastroplication or reflux surgery.     Review of Systems   Constitutional: Positive for fatigue. Negative for activity change, appetite change, chills,  diaphoresis, fever, unexpected weight change and weight loss.   HENT: Positive for trouble swallowing. Negative for hoarse voice and sore throat.    Respiratory: Negative for cough, choking, shortness of breath and wheezing.    Cardiovascular: Negative for chest pain.   Gastrointestinal: Positive for dysphagia (worse with salad and bread) and heartburn. Negative for abdominal distention, abdominal pain, anal bleeding, blood in stool, constipation, diarrhea, melena, nausea, rectal pain and vomiting.   Musculoskeletal: Negative for muscle weakness.       No LMP recorded. Patient is postmenopausal.  History reviewed. No pertinent past medical history.  Past Surgical History:   Procedure Laterality Date    AUGMENTATION OF BREAST      COLONOSCOPY      UPPER GASTROINTESTINAL ENDOSCOPY       Family History   Problem Relation Age of Onset    Breast cancer Maternal Aunt     Colon cancer Neg Hx     Colon polyps Neg Hx     Crohn's disease Neg Hx     Ulcerative colitis Neg Hx     Stomach cancer Neg Hx     Rectal cancer Neg Hx      Social History     Tobacco Use    Smoking status: Former Smoker     Quit date: 1990     Years since quittin.0    Smokeless tobacco: Never Used   Substance Use Topics    Alcohol use: Yes     Comment: rarely     Wt Readings from Last 10 Encounters:   22 46 kg (101 lb 6.6 oz)   10/29/21 45.4 kg (100 lb)   10/28/21 46.2 kg (101 lb 13.6 oz)     Lab Results   Component Value Date    WBC 7.04 10/28/2021    HGB 10.2 (L) 10/28/2021    HCT 31.3 (L) 10/28/2021    MCV 91 10/28/2021     10/28/2021     CMP  Sodium   Date Value Ref Range Status   10/28/2021 128 (L) 136 - 145 mmol/L Final     Potassium   Date Value Ref Range Status   10/28/2021 3.6 3.5 - 5.1 mmol/L Final     Chloride   Date Value Ref Range Status   10/28/2021 97 95 - 110 mmol/L Final     CO2   Date Value Ref Range Status   10/28/2021 22 (L) 23 - 29 mmol/L Final     Glucose   Date Value Ref Range Status   10/28/2021  91 70 - 110 mg/dL Final     BUN   Date Value Ref Range Status   10/28/2021 11 8 - 23 mg/dL Final     Creatinine   Date Value Ref Range Status   10/28/2021 0.6 0.5 - 1.4 mg/dL Final     Calcium   Date Value Ref Range Status   10/28/2021 8.5 (L) 8.7 - 10.5 mg/dL Final     Total Protein   Date Value Ref Range Status   10/28/2021 6.0 6.0 - 8.4 g/dL Final     Albumin   Date Value Ref Range Status   10/28/2021 3.4 (L) 3.5 - 5.2 g/dL Final     Total Bilirubin   Date Value Ref Range Status   10/28/2021 0.2 0.1 - 1.0 mg/dL Final     Comment:     For infants and newborns, interpretation of results should be based  on gestational age, weight and in agreement with clinical  observations.    Premature Infant recommended reference ranges:  Up to 24 hours.............<8.0 mg/dL  Up to 48 hours............<12.0 mg/dL  3-5 days..................<15.0 mg/dL  6-29 days.................<15.0 mg/dL       Alkaline Phosphatase   Date Value Ref Range Status   10/28/2021 63 55 - 135 U/L Final     AST   Date Value Ref Range Status   10/28/2021 23 10 - 40 U/L Final     ALT   Date Value Ref Range Status   10/28/2021 19 10 - 44 U/L Final     Anion Gap   Date Value Ref Range Status   10/28/2021 9 8 - 16 mmol/L Final     eGFR if    Date Value Ref Range Status   10/28/2021 >60 >60 mL/min/1.73 m^2 Final     eGFR if non    Date Value Ref Range Status   10/28/2021 >60 >60 mL/min/1.73 m^2 Final     Comment:     Calculation used to obtain the estimated glomerular filtration  rate (eGFR) is the CKD-EPI equation.        Lab Results   Component Value Date    TSH 1.379 10/28/2021       Objective:      Physical Exam  Vitals and nursing note reviewed.   Constitutional:       General: She is not in acute distress.     Appearance: Normal appearance. She is not ill-appearing.   HENT:      Mouth/Throat:      Comments: Unable to assess due to COVID-19 concerns.  Eyes:      Extraocular Movements: Extraocular movements intact.       Pupils: Pupils are equal, round, and reactive to light.   Cardiovascular:      Rate and Rhythm: Normal rate and regular rhythm.      Heart sounds: Normal heart sounds.   Pulmonary:      Effort: Pulmonary effort is normal. No respiratory distress.      Breath sounds: Normal breath sounds.   Abdominal:      General: Abdomen is flat. Bowel sounds are normal. There is no distension or abdominal bruit. There are no signs of injury.      Palpations: Abdomen is soft. There is no shifting dullness, hepatomegaly or mass.      Tenderness: There is no abdominal tenderness. There is no guarding or rebound. Negative signs include Machado's sign and McBurney's sign.      Hernia: No hernia is present.   Skin:     General: Skin is warm and dry.      Coloration: Skin is not jaundiced.   Neurological:      Mental Status: She is alert and oriented to person, place, and time.   Psychiatric:         Attention and Perception: Attention normal.         Mood and Affect: Mood normal.         Speech: Speech normal.         Behavior: Behavior normal.         Assessment:       1. Gastroesophageal reflux disease, unspecified whether esophagitis present    2. Dysphagia, unspecified type    3. History of hiatal hernia    4. History of Dixon's esophagus    5. History of colon polyps    6. Anemia, unspecified type        Plan:       Gastroesophageal reflux disease, unspecified whether esophagitis present  - schedule EGD, discussed procedure with patient, including risks and benefits, patient verbalized understanding  -discussed about the different types of medications used to treat reflux and how to use them, antacids can be used PRN for breakthrough heartburn symptoms by reducing stomach acid that is already produced, H2 blockers work by limiting the amount acid production, & PPI's work to block acid production and are taken daily, patient verbalized understanding.  -Educated patient on lifestyle modifications to help control/reduce  reflux/abdominal pain including: avoid large meals, avoid eating within 2-3 hours of bedtime (avoid late night eating & lying down soon after eating), elevate head of bed if nocturnal symptoms are present, smoking cessation (if current smoker), & weight loss (if overweight).   -Educated to avoid known foods which trigger reflux symptoms & to minimize/avoid high-fat foods, chocolate, caffeine, citrus, alcohol, & tomato products.  -Advised to avoid/limit use of NSAID's, since they can cause GI upset, bleeding, and/or ulcers. If needed, take with food.   -CONTINUE: omeprazole 40 mg once daily  -Records request for last EGD & EGD pathology report    Dysphagia, unspecified type  - schedule EGD, discussed procedure with patient and possible esophageal dilation may be performed during procedure if indicated, patient verbalized understanding  - educated patient to eat smaller more frequent meals and to eat slowly and advised to eat a soft diet.  - possible UGI/esophagram/esophageal manometry if symptoms persist    History of hiatal hernia  - schedule EGD, discussed procedure with patient, including risks and benefits, patient verbalized understanding  -discussed diagnosis with patient & that it is usually managed by controlling reflux symptoms, surgery is an option, but usually performed if reflux is uncontrolled by medication management and lifestyle/dietary modifications; if symptoms persist despite medication management and lifestyle/dietary modifications, we can refer to general surgery to consult about surgical options, patient verbalized understanding  -CONTINUE: omeprazole 40 mg once daily    History of Dixon's esophagus  - schedule EGD, discussed procedure with patient, including risks and benefits, patient verbalized understanding  -Records request for last EGD & EGD pathology report    History of colon polyps  -Records request for last colonoscopy/patho report    Anemia, unspecified type  -Follow-up with PCP  and/or hematology for continued evaluation and management.    Follow up in about 4 weeks (around 2/16/2022), or if symptoms worsen or fail to improve.      If no improvement in symptoms or symptoms worsen, call/follow-up at clinic or go to ER.        30 minutes of total time spent on the encounter, which includes face to face time and non-face to face time preparing to see the patient (eg, review of tests), Obtaining and/or reviewing separately obtained history, Documenting clinical information in the electronic or other health record, Independently interpreting results (not separately reported) and communicating results to the patient/family/caregiver, or Care coordination (not separately reported).

## 2022-01-26 ENCOUNTER — OFFICE VISIT (OUTPATIENT)
Dept: NEUROLOGY | Facility: CLINIC | Age: 67
End: 2022-01-26
Payer: OTHER GOVERNMENT

## 2022-01-26 ENCOUNTER — LAB VISIT (OUTPATIENT)
Dept: LAB | Facility: HOSPITAL | Age: 67
End: 2022-01-26
Payer: OTHER GOVERNMENT

## 2022-01-26 VITALS
WEIGHT: 99.44 LBS | DIASTOLIC BLOOD PRESSURE: 68 MMHG | BODY MASS INDEX: 19.52 KG/M2 | HEIGHT: 60 IN | SYSTOLIC BLOOD PRESSURE: 106 MMHG | HEART RATE: 78 BPM

## 2022-01-26 DIAGNOSIS — E87.1 HYPONATREMIA: ICD-10-CM

## 2022-01-26 DIAGNOSIS — G93.40 ENCEPHALOPATHY ACUTE: ICD-10-CM

## 2022-01-26 DIAGNOSIS — R41.3 MEMORY DEFICIT: Primary | ICD-10-CM

## 2022-01-26 DIAGNOSIS — R41.3 MEMORY DEFICIT: ICD-10-CM

## 2022-01-26 DIAGNOSIS — R47.89 WORD FINDING DIFFICULTY: ICD-10-CM

## 2022-01-26 LAB
ALBUMIN SERPL BCP-MCNC: 3.8 G/DL (ref 3.5–5.2)
ALP SERPL-CCNC: 64 U/L (ref 55–135)
ALT SERPL W/O P-5'-P-CCNC: 19 U/L (ref 10–44)
ANION GAP SERPL CALC-SCNC: 7 MMOL/L (ref 8–16)
AST SERPL-CCNC: 23 U/L (ref 10–40)
BILIRUB SERPL-MCNC: 0.3 MG/DL (ref 0.1–1)
BUN SERPL-MCNC: 18 MG/DL (ref 8–23)
CALCIUM SERPL-MCNC: 9 MG/DL (ref 8.7–10.5)
CHLORIDE SERPL-SCNC: 104 MMOL/L (ref 95–110)
CO2 SERPL-SCNC: 29 MMOL/L (ref 23–29)
CREAT SERPL-MCNC: 0.7 MG/DL (ref 0.5–1.4)
EST. GFR  (AFRICAN AMERICAN): >60 ML/MIN/1.73 M^2
EST. GFR  (NON AFRICAN AMERICAN): >60 ML/MIN/1.73 M^2
FOLATE SERPL-MCNC: 14.3 NG/ML (ref 4–24)
GLUCOSE SERPL-MCNC: 73 MG/DL (ref 70–110)
POTASSIUM SERPL-SCNC: 4.3 MMOL/L (ref 3.5–5.1)
PROT SERPL-MCNC: 6.8 G/DL (ref 6–8.4)
SODIUM SERPL-SCNC: 140 MMOL/L (ref 136–145)

## 2022-01-26 PROCEDURE — 84446 ASSAY OF VITAMIN E: CPT | Performed by: STUDENT IN AN ORGANIZED HEALTH CARE EDUCATION/TRAINING PROGRAM

## 2022-01-26 PROCEDURE — 99999 PR PBB SHADOW E&M-EST. PATIENT-LVL III: CPT | Mod: PBBFAC,,, | Performed by: STUDENT IN AN ORGANIZED HEALTH CARE EDUCATION/TRAINING PROGRAM

## 2022-01-26 PROCEDURE — 84207 ASSAY OF VITAMIN B-6: CPT | Performed by: STUDENT IN AN ORGANIZED HEALTH CARE EDUCATION/TRAINING PROGRAM

## 2022-01-26 PROCEDURE — 82746 ASSAY OF FOLIC ACID SERUM: CPT | Performed by: STUDENT IN AN ORGANIZED HEALTH CARE EDUCATION/TRAINING PROGRAM

## 2022-01-26 PROCEDURE — 80053 COMPREHEN METABOLIC PANEL: CPT | Performed by: STUDENT IN AN ORGANIZED HEALTH CARE EDUCATION/TRAINING PROGRAM

## 2022-01-26 PROCEDURE — 99999 PR PBB SHADOW E&M-EST. PATIENT-LVL III: ICD-10-PCS | Mod: PBBFAC,,, | Performed by: STUDENT IN AN ORGANIZED HEALTH CARE EDUCATION/TRAINING PROGRAM

## 2022-01-26 PROCEDURE — 99213 OFFICE O/P EST LOW 20 MIN: CPT | Mod: PBBFAC | Performed by: STUDENT IN AN ORGANIZED HEALTH CARE EDUCATION/TRAINING PROGRAM

## 2022-01-26 PROCEDURE — 99203 OFFICE O/P NEW LOW 30 MIN: CPT | Mod: S$PBB,,, | Performed by: STUDENT IN AN ORGANIZED HEALTH CARE EDUCATION/TRAINING PROGRAM

## 2022-01-26 PROCEDURE — 99203 PR OFFICE/OUTPT VISIT, NEW, LEVL III, 30-44 MIN: ICD-10-PCS | Mod: S$PBB,,, | Performed by: STUDENT IN AN ORGANIZED HEALTH CARE EDUCATION/TRAINING PROGRAM

## 2022-01-26 RX ORDER — LANOLIN ALCOHOL/MO/W.PET/CERES
CREAM (GRAM) TOPICAL
COMMUNITY
Start: 2021-09-10

## 2022-01-26 RX ORDER — HYDROXYZINE HYDROCHLORIDE 50 MG/1
TABLET, FILM COATED ORAL
COMMUNITY
Start: 2021-09-16

## 2022-01-26 RX ORDER — METHOCARBAMOL 500 MG/1
TABLET, FILM COATED ORAL
COMMUNITY
Start: 2021-06-02

## 2022-01-26 RX ORDER — SUMATRIPTAN SUCCINATE 100 MG/1
100 TABLET ORAL
COMMUNITY

## 2022-01-26 RX ORDER — BUPROPION HYDROCHLORIDE 150 MG/1
TABLET ORAL
COMMUNITY
Start: 2021-09-16

## 2022-01-26 RX ORDER — ALBUTEROL SULFATE 90 UG/1
AEROSOL, METERED RESPIRATORY (INHALATION)
COMMUNITY
Start: 2021-09-10 | End: 2023-05-02

## 2022-01-26 RX ORDER — CALCIUM CARBONATE/VITAMIN D3 250-3.125
TABLET ORAL
COMMUNITY
Start: 2021-09-10

## 2022-01-26 NOTE — ASSESSMENT & PLAN NOTE
After Visit Summary   1/25/2018    Diana Green    MRN: [id number]           Patient Information     Date Of Birth          2006        Visit Information        Provider Department      1/25/2018 10:00 AM Tita Granados MD Peds Rheumatology        Today's Diagnoses     Linear scleroderma        Methotrexate, long term, current use        Inflammatory arthritis        Immunosuppressed status--on mycophenolate mofetil          Care Instructions    No active arthritis.  Left leg is every so slight longer than right; we'll quantify it with an x-ray.  Knee x-ray as well as going to taper medications (MTX to oral).  Change mtx to oral; if reason to go back ($/preference) just call.  Continue MMF.    Follow up in 3-4 months.    Tita Granados M.D.   of Pediatrics  Pediatric Rheumatology    [facility]    For Help:  The Pediatric Call Center at [phone] can help with scheduling of routine follow up visits.  Wanda Colón and Va Carranza are the Nurse Coordinators for the Division of Pediatric Rheumatology and can be reached directly at [phone]. They can help with questions about your child s rheumatic condition, medications, and test results.   Please try to schedule infusions 3 months in advance.  Please try to give us 72 hours or longer notice if you need to cancel infusions so other patients can benefit from this opening).  Note: Insurance authorization must be obtained before any infusion can be scheduled. If you change health insurance, you must notify our office as soon as possible, so that the infusion can be reauthorized.    For emergencies after hours or on the weekends, please call the page  at [phone] and ask to speak to the physician on-call for Pediatric Rheumatology. Please do not use [facility] for urgent requests.  Main  Services:  [phone]  o Hmong/French/Omani:  Patient refers that since her admission on October she has been having word finding difficulty that happens while having a conversation, no loss of fluency or other neurological deficits   -- Her B1 and B12 were reviewed   -- Will ordered further vitamins, including Vit D, folate, B6 and E.   -- If normal, will consider neuropsychology evaluation    "[phone]  o Cypriot: [phone]  o Japanese: [phone]            Follow-ups after your visit        Follow-up notes from your care team     Return in about 3 months (around 4/25/2018).      Future tests that were ordered for you today     Open Future Orders        Priority Expected Expires Ordered    X-ray Bilateral Knee 1-2 vw Routine 1/25/2018 1/25/2019 1/25/2018    XR Leg Length Evaluation Routine 1/25/2018 1/25/2019 1/25/2018            Who to contact     Please call your clinic at [phone] to:    Ask questions about your health    Make or cancel appointments    Discuss your medicines    Learn about your test results    Speak to your doctor   If you have compliments or concerns about an experience at your clinic, or if you wish to file a complaint, please contact [facility] Patient Relations at [phone] or email us at [email or web address]         Additional Information About Your Visit        MyChart Information     [employer] is an electronic gateway that provides easy, online access to your medical records. With [employer], you can request a clinic appointment, read your test results, renew a prescription or communicate with your care team.     To sign up for [employer], please contact your [facility] or call [phone] for assistance.           Care EveryWhere ID     This is your Care EveryWhere ID. This could be used by other organizations to access your [facility] medical records  [id number]        Your Vitals Were     Pulse Temperature Height BMI (Body Mass Index)          82 98.3  F (36.8  C) (Oral) 4' 6.88\" (139.4 cm) 17.08 kg/m2         Blood Pressure from Last 3 Encounters:   01/25/18 100/67   07/12/17 115/67   04/12/17 90/58    Weight from Last 3 Encounters:   01/25/18 73 lb 3.1 oz (33.2 kg) (70 %)*   07/12/17 66 lb 12.8 oz (30.3 kg) (66 %)*   04/12/17 65 lb 0.6 oz (29.5 kg) (67 %)*     * Growth percentiles are based on CDC " "2-20 Years data.              We Performed the Following     CBC with platelets differential     Creatinine     Hepatic Function panel     Routine UA with micro reflex to culture          Today's Medication Changes          These changes are accurate as of 1/25/18 11:22 AM.  If you have any questions, ask your nurse or doctor.               Start taking these medicines.        Dose/Directions    methotrexate 2.5 MG tablet CHEMO   Used for:  Linear scleroderma, Methotrexate, long term, current use, Inflammatory arthritis, Immunosuppressed status (H)   Replaces:  methotrexate 50 MG/2ML injection CHEMO   Started by:  Tita Granados MD        Dose:  15 mg   Take 6 tablets (15 mg) by mouth once a week   Quantity:  24 tablet   Refills:  4         Stop taking these medicines if you haven't already. Please contact your care team if you have questions.     insulin syringe 31G X 5/16\" 1 ML Misc   Stopped by:  Tita Granados MD           methotrexate 50 MG/2ML injection CHEMO   Replaced by:  methotrexate 2.5 MG tablet CHEMO   Stopped by:  Tita Granados MD                Where to get your medicines      These medications were sent to [facility] [address] [facility] [address] [facility] [address]  [address] [address]     Phone:  [phone]     methotrexate 2.5 MG tablet CHEMO                Primary Care Provider Office Phone # Fax #    [facility] [phone]       [address] [address]        Equal Access to Services     LALO BLAKE AH: Aarti phillip Sobridget, waaxda luqadaha, qaybta kaalmada handy, waxay amanda perez. So [facility] [phone].    ATENCIÓN: Si habla español, tiene a brasher disposición servicios gratuitos de asistencia lingüística. Llame al [phone].    We comply with applicable federal civil rights laws and [place] laws. We do not discriminate on the basis of " race, color, national origin, age, disability, sex, sexual orientation, or gender identity.            Thank you!     Thank you for choosing [facility]  for your care. Our goal is always to provide you with excellent care. Hearing back from our patients is one way we can continue to improve our services. Please take a few minutes to complete the written survey that you may receive in the mail after your visit with us. Thank you!             Your Updated Medication List - Protect others around you: Learn how to safely use, store and throw away your medicines at www.disposemymeds.org.          This list is accurate as of 1/25/18 11:22 AM.  Always use your most recent med list.                   Brand Name Dispense Instructions for use Diagnosis    folic acid 1 MG tablet    FOLVITE    90 tablet    Take 1 tablet (1 mg) by mouth daily    Scleroderma (H), Arthritis       methotrexate 2.5 MG tablet CHEMO     24 tablet    Take 6 tablets (15 mg) by mouth once a week    Linear scleroderma, Methotrexate, long term, current use, Inflammatory arthritis, Immunosuppressed status (H)       mycophenolate 250 MG capsule    GENERIC EQUIVALENT    150 capsule    Take 500 mg (2 caps) by mouth in the morning and 750 mg (3 caps) by mouth in the evening.    Linear scleroderma, Inflammatory arthritis, Methotrexate, long term, current use, NSAID long-term use, Immunosuppressed status (H)

## 2022-01-26 NOTE — PROGRESS NOTES
[facility] - NEUROLOGY 7TH [place] [facility], [place]    Date: 1/26/22  Patient Name: Saira Osuna   MRN: [id number]   PCP: Primary Doctor No  Referring Provider: Self, Aaareferral    Assessment:   Saira Osuna is a 66 y.o. female presenting as initial evaluation after recent admission on 10/28/21 at [facility] for acute encephalopathy most likely secondary to hyponatremia. Patient had MRI brain and EEG with no acute findings to suggest pathology for encephalopathy. MRI with chronic microvascular changes and EEG with diffuse slowing concerning for metabolic issue. Vit B1, B12 also drawn and wnl. Patient refers she is close to her baseline, but she is c/o word finding difficulty while having conversations. MME 30 on this encounter. Patient has gone through a lot of changes these past months with associated stressors. She refers a 10 pound weight lost and that her diet has changed.     Plan:     Problem List Items Addressed This Visit        Neuro    Encephalopathy acute    Current Assessment & Plan     -- see hyponatremia          Memory deficit - Primary    Current Assessment & Plan     Patient refers that since her admission on October she has been having word finding difficulty that happens while having a conversation, no loss of fluency or other neurological deficits   -- Her B1 and B12 were reviewed   -- Will ordered further vitamins, including Vit D, folate, B6 and E.   -- If normal, will consider neuropsychology evaluation          Relevant Orders    VITAMIN B6    VITAMIN E    Calcitriol (1,25 di-OH Vitamin D)    FOLATE    Comprehensive metabolic panel       Renal/    Hyponatremia    Current Assessment & Plan     -- Had recent admission for acute encephalopathy most likely secondary to hyponatremia.   -- CMP has not been repeated since then and patient does not feel at her 100% baseline   -- Repeat CMP ordered            Other Visit Diagnoses     Word finding difficulty        Relevant  Orders    VITAMIN B6    VITAMIN E    Calcitriol (1,25 di-OH Vitamin D)    FOLATE    Comprehensive metabolic panel          Nabila Vera MD    Patient note was created using MModal Dictation.  Any errors in syntax or even information may not have been identified and edited on initial review prior to signing this note.  Subjective:   Patient seen in consultation at the request of Self, Aaareferral for the evaluation of memory deficit. A copy of this note will be sent to the referring physician.        HPI:   Ms. Saira Osuna is a 66 y.o. female w PMH chronic hypoxia (uses oxygen HS), anxiety and depression [facility] for acute encephalopathy. Patient refers that she remember waking up and after that she was confused and does not recall much details. She was admitted to [facility] with neurology consult and had MRI brain and EEG done. MRI brain showing chronic microvascular changes, EEG with diffuse slowing but no seizures. On her labs she had a low Na 128, no other electrolyte abnormalities, no infections. Patient was placed on fluid restriction but she left AMA after 2 days. Other labs drawn were B1 and B12 and normal/     Today she refers that she does not feel at her baseline as she has been having some word fingind difficulty. She feels that she looses her train of thought and has issues getting her words and other finish her sentences. This started after her encephalopathy and she denies any other issues such as getting loss while driving or walking, not recognizing peoples faces or places. She lives with her daughter at [place]. Moved to [place] from [place] in august to help her daughter who is a recovering alcoholic. She refers that is has been complicated and stressful couple of month due to adjusting to a new place, not knowing people, not having the best relationship with her daughter. Daughter is a marine and patient served in the  for  9 years.     Some of her medications include:   wellbutrin xl 150 (3 tablets) for depression   Gabapentin PRN BID   Hydroxyzine 50mg BID for panic attcaks   Sumatriptan 100mg for migraines   Trazodone 200mg QHS insomnia.         PAST MEDICAL HISTORY:  History reviewed. No pertinent past medical history.    PAST SURGICAL HISTORY:  Past Surgical History:   Procedure Laterality Date    AUGMENTATION OF BREAST      COLONOSCOPY      UPPER GASTROINTESTINAL ENDOSCOPY         CURRENT MEDS:  Current Outpatient Medications   Medication Sig Dispense Refill    albuterol (PROVENTIL/VENTOLIN HFA) 90 mcg/actuation inhaler INHALE 2 PUFFS BY MOUTH FOUR TIMES A DAY AS NEEDED FOR BREATHING      aspirin (ECOTRIN) 81 MG EC tablet Take 81 mg by mouth once daily.      bisacodyL (DULCOLAX) 5 mg EC tablet Dulcolax (bisacodyl) 5 mg tablet,delayed release   take 2 (5mg) tablets as needed if no bowel movement for 2-3 days      buPROPion (WELLBUTRIN XL) 150 MG TB24 tablet TAKE THREE TABLETS BY MOUTH EVERY DAY FOR DEPRESSION      calcium carbonate-vitamin D3 250-125 mg 250 mg-3.125 mcg (125 unit) Tab TAKE 2 TABLETS BY MOUTH EVERY DAY AS A MINERAL SUPPLEMENT      celecoxib (CELEBREX) 200 MG capsule celecoxib 200 mg capsule   Take 1 capsule twice a day by oral route.      cyanocobalamin (VITAMIN B-12) 1000 MCG tablet TAKE ONE TABLET BY MOUTH EVERY DAY FOR VITAMIN DEFICIENCIES      ferrous sulfate (FEOSOL) 325 mg (65 mg iron) Tab tablet Iron (ferrous sulfate) 325 mg (65 mg iron) tablet   Take 1 tablet every other day by oral route.      gabapentin (NEURONTIN) 300 MG capsule gabapentin 300 mg capsule   Take 1 capsule 3 times a day by oral route as needed.      hydrOXYzine (ATARAX) 50 MG tablet TAKE ONE TABLET BY MOUTH THREE TIMES A DAY AS NEEDED FOR ALLERGIES/ITCHING ANXIETY      methocarbamoL (ROBAXIN) 500 MG Tab TAKE ONE TABLET BY MOUTH THREE TIMES A DAY IF NEEDED (MUSCLE RELAXANT) (START WITH 1/2 TABLET AT BEDTIME THEN ADD IN MORNING  DOSE AND IF  TOLERATED THEN TAKE 1/2 TABLET THREE TIMES A DAY FOR A FEW DAYS AND THEN  CAN INCREASE TO A FULL TABLET IF NO SIDE EFFECTS) (MUSCLE RELAXANT)  (START WITH 1/2 TABLET AT BEDTIME THEN ADD IN MORNING DOSE AND IF   TOLERATED THEN TAKE 1/2 TABLET THREE TIMES A DAY FOR A FEW DAYS AND THEN   CAN INCREASE TO A FULL TABLET IF NO SIDE EFFECTS)      omeprazole (PRILOSEC) 40 MG capsule omeprazole 40 mg capsule,delayed release   Take 1 capsule every day by oral route.      polyethylene glycol (GLYCOLAX) 17 gram/dose powder Miralax 17 gram/dose oral powder   1 capful daily at bedtime      sumatriptan (IMITREX) 100 MG tablet Take 100 mg by mouth every 2 (two) hours as needed for Migraine.      trazodone HCl (TRAZODONE ORAL) trazodone   100 mg  at night       No current facility-administered medications for this visit.       ALLERGIES:  Review of patient's allergies indicates:   Allergen Reactions    Penicillins Hives       FAMILY HISTORY:  Family History   Problem Relation Age of Onset    Breast cancer Maternal Aunt     Colon cancer Neg Hx     Colon polyps Neg Hx     Crohn's disease Neg Hx     Ulcerative colitis Neg Hx     Stomach cancer Neg Hx     Rectal cancer Neg Hx        SOCIAL HISTORY:  Social History     Tobacco Use    Smoking status: Former Smoker     Quit date: 1990     Years since quittin.0    Smokeless tobacco: Never Used   Substance Use Topics    Alcohol use: Yes     Comment: rarely       Review of Systems:  12 system review of systems is negative except for the symptoms mentioned in HPI.      Objective:     Vitals:    22 1403   BP: 106/68   BP Location: Left arm   Patient Position: Sitting   BP Method: Large (Automatic)   Pulse: 78   Weight: 45.1 kg (99 lb 6.8 oz)   Height: 5' (1.524 m)     General: NAD, well nourished   Eyes: no tearing, discharge, no erythema   ENT: moist mucous membranes of the oral cavity, nares patent    Neck: Supple, full range of  motion  Cardiovascular: Warm and well perfused, pulses equal and symmetrical  Lungs: Normal work of breathing, normal chest wall excursions  Skin: No rash, lesions, or breakdown on exposed skin  Psychiatry: Mood and affect are appropriate   Abdomen: soft, non tender, non distended  Extremeties: No cyanosis, clubbing or edema.    Neurological   MENTAL STATUS: Alert and oriented to person, place, and time. Attention and concentration within normal limits. Speech without dysarthria, able to name and repeat without difficulty. Recent and remote memory within normal limits   CRANIAL NERVES: Visual fields intact. PERRL. EOMI. Facial sensation intact. Face symmetrical. Hearing grossly intact. Full shoulder shrug bilaterally. Tongue protrudes midline   SENSORY: Sensation is intact to pin, light touch and temperature throughout.  Joint position perception intact. Negative Romberg.   MOTOR: Normal bulk and tone. No pronator drift.  5/5 deltoid, biceps, triceps, interosseous, hand  bilaterally. 5/5 iliopsoas, knee extension/flexion, foot dorsi/plantarflexion bilaterally.    REFLEXES: Symmetric and 2+ throughout. Toes down going bilaterally.   CEREBELLAR/COORDINATION/GAIT: Gait steady with normal arm swing and stride length.  Heel to shin intact. Finger to nose intact. Normal rapid alternating movements.     MME 30    Brain MRI   Impression:     1. No evidence of acute intracranial abnormality or pathologic enhancement.  2. Mild, age-appropriate generalized cerebral volume loss with scattered nonspecific supratentorial white matter findings most suggestive of chronic microvascular ischemic change.    EEG 10/27/21    IMPRESSION:  Abnormal study due to mild to moderate  diffuse background slowing consistent with diffuse cerebral dysfunction and encephalopathy which may be on the basis of toxic, metabolic, or primary neuronal disorder.

## 2022-01-26 NOTE — ASSESSMENT & PLAN NOTE
-- Had recent admission for acute encephalopathy most likely secondary to hyponatremia.   -- CMP has not been repeated since then and patient does not feel at her 100% baseline   -- Repeat CMP ordered

## 2022-02-01 LAB
A-TOCOPHEROL VIT E SERPL-MCNC: 1716 UG/DL (ref 500–1800)
PYRIDOXAL SERPL-MCNC: 7 UG/L (ref 5–50)

## 2022-02-05 ENCOUNTER — LAB VISIT (OUTPATIENT)
Dept: PRIMARY CARE CLINIC | Facility: CLINIC | Age: 67
End: 2022-02-05
Payer: OTHER GOVERNMENT

## 2022-02-05 DIAGNOSIS — Z01.818 PREOP TESTING: ICD-10-CM

## 2022-02-05 PROCEDURE — U0003 INFECTIOUS AGENT DETECTION BY NUCLEIC ACID (DNA OR RNA); SEVERE ACUTE RESPIRATORY SYNDROME CORONAVIRUS 2 (SARS-COV-2) (CORONAVIRUS DISEASE [COVID-19]), AMPLIFIED PROBE TECHNIQUE, MAKING USE OF HIGH THROUGHPUT TECHNOLOGIES AS DESCRIBED BY CMS-2020-01-R: HCPCS

## 2022-02-05 PROCEDURE — U0005 INFEC AGEN DETEC AMPLI PROBE: HCPCS

## 2022-02-06 LAB
SARS-COV-2 RNA RESP QL NAA+PROBE: NOT DETECTED
SARS-COV-2- CYCLE NUMBER: NORMAL

## 2022-02-08 ENCOUNTER — HOSPITAL ENCOUNTER (OUTPATIENT)
Facility: HOSPITAL | Age: 67
Discharge: HOME | End: 2022-02-08
Attending: INTERNAL MEDICINE | Admitting: INTERNAL MEDICINE
Payer: OTHER GOVERNMENT

## 2022-02-08 ENCOUNTER — ANESTHESIA EVENT (OUTPATIENT)
Dept: ENDOSCOPY | Facility: HOSPITAL | Age: 67
End: 2022-02-08
Payer: OTHER GOVERNMENT

## 2022-02-08 ENCOUNTER — ANESTHESIA (OUTPATIENT)
Dept: ENDOSCOPY | Facility: HOSPITAL | Age: 67
End: 2022-02-08
Payer: OTHER GOVERNMENT

## 2022-02-08 VITALS
BODY MASS INDEX: 19.63 KG/M2 | SYSTOLIC BLOOD PRESSURE: 95 MMHG | HEART RATE: 72 BPM | DIASTOLIC BLOOD PRESSURE: 61 MMHG | HEIGHT: 60 IN | WEIGHT: 100 LBS | TEMPERATURE: 98 F | RESPIRATION RATE: 10 BRPM | OXYGEN SATURATION: 98 %

## 2022-02-08 DIAGNOSIS — K44.9 HIATAL HERNIA: Primary | ICD-10-CM

## 2022-02-08 DIAGNOSIS — K29.70 GASTRITIS, PRESENCE OF BLEEDING UNSPECIFIED, UNSPECIFIED CHRONICITY, UNSPECIFIED GASTRITIS TYPE: ICD-10-CM

## 2022-02-08 DIAGNOSIS — K22.2 SCHATZKI'S RING: ICD-10-CM

## 2022-02-08 DIAGNOSIS — Z87.19 HISTORY OF BARRETT'S ESOPHAGUS: ICD-10-CM

## 2022-02-08 PROCEDURE — 43239 PR EGD, FLEX, W/BIOPSY, SGL/MULTI: ICD-10-PCS | Mod: 59,,, | Performed by: INTERNAL MEDICINE

## 2022-02-08 PROCEDURE — 43239 EGD BIOPSY SINGLE/MULTIPLE: CPT | Mod: 59,,, | Performed by: INTERNAL MEDICINE

## 2022-02-08 PROCEDURE — D9220A PRA ANESTHESIA: Mod: ,,, | Performed by: ANESTHESIOLOGY

## 2022-02-08 PROCEDURE — 88305 TISSUE EXAM BY PATHOLOGIST: ICD-10-PCS | Mod: 26,,, | Performed by: PATHOLOGY

## 2022-02-08 PROCEDURE — 37000008 HC ANESTHESIA 1ST 15 MINUTES: Performed by: INTERNAL MEDICINE

## 2022-02-08 PROCEDURE — 27201012 HC FORCEPS, HOT/COLD, DISP: Performed by: INTERNAL MEDICINE

## 2022-02-08 PROCEDURE — 43248 EGD GUIDE WIRE INSERTION: CPT | Mod: ,,, | Performed by: INTERNAL MEDICINE

## 2022-02-08 PROCEDURE — 88305 TISSUE EXAM BY PATHOLOGIST: CPT | Performed by: PATHOLOGY

## 2022-02-08 PROCEDURE — 43248 EGD GUIDE WIRE INSERTION: CPT | Performed by: INTERNAL MEDICINE

## 2022-02-08 PROCEDURE — 25000003 PHARM REV CODE 250: Performed by: NURSE ANESTHETIST, CERTIFIED REGISTERED

## 2022-02-08 PROCEDURE — 63600175 PHARM REV CODE 636 W HCPCS: Performed by: NURSE ANESTHETIST, CERTIFIED REGISTERED

## 2022-02-08 PROCEDURE — 88305 TISSUE EXAM BY PATHOLOGIST: CPT | Mod: 26,,, | Performed by: PATHOLOGY

## 2022-02-08 PROCEDURE — 00731 ANES UPR GI NDSC PX NOS: CPT | Performed by: INTERNAL MEDICINE

## 2022-02-08 PROCEDURE — 25000003 PHARM REV CODE 250: Performed by: INTERNAL MEDICINE

## 2022-02-08 PROCEDURE — D9220A PRA ANESTHESIA: ICD-10-PCS | Mod: ,,, | Performed by: ANESTHESIOLOGY

## 2022-02-08 PROCEDURE — 43239 EGD BIOPSY SINGLE/MULTIPLE: CPT | Mod: 59 | Performed by: INTERNAL MEDICINE

## 2022-02-08 PROCEDURE — 43248 PR EGD, FLEX, W/DILATION OVER GUIDEWIRE: ICD-10-PCS | Mod: ,,, | Performed by: INTERNAL MEDICINE

## 2022-02-08 PROCEDURE — C1769 GUIDE WIRE: HCPCS | Performed by: INTERNAL MEDICINE

## 2022-02-08 RX ORDER — SODIUM CHLORIDE 9 MG/ML
INJECTION, SOLUTION INTRAVENOUS CONTINUOUS
Status: DISCONTINUED | OUTPATIENT
Start: 2022-02-08 | End: 2022-02-08 | Stop reason: HOSPADM

## 2022-02-08 RX ORDER — PROPOFOL 10 MG/ML
VIAL (ML) INTRAVENOUS
Status: DISCONTINUED | OUTPATIENT
Start: 2022-02-08 | End: 2022-02-08

## 2022-02-08 RX ORDER — LIDOCAINE HCL/PF 100 MG/5ML
SYRINGE (ML) INTRAVENOUS
Status: DISCONTINUED | OUTPATIENT
Start: 2022-02-08 | End: 2022-02-08

## 2022-02-08 RX ADMIN — PROPOFOL 30 MG: 10 INJECTION, EMULSION INTRAVENOUS at 10:02

## 2022-02-08 RX ADMIN — SODIUM CHLORIDE: 0.9 INJECTION, SOLUTION INTRAVENOUS at 09:02

## 2022-02-08 RX ADMIN — PROPOFOL 100 MG: 10 INJECTION, EMULSION INTRAVENOUS at 10:02

## 2022-02-08 RX ADMIN — LIDOCAINE HYDROCHLORIDE 100 MG: 20 INJECTION INTRAVENOUS at 10:02

## 2022-02-08 NOTE — ANESTHESIA POSTPROCEDURE EVALUATION
Anesthesia Post Evaluation    Patient: Saira Osuna    Procedure(s) Performed: Procedure(s) (LRB):  EGD (ESOPHAGOGASTRODUODENOSCOPY) (N/A)    Final Anesthesia Type: general      Patient location during evaluation: PACU  Patient participation: Yes- Able to Participate  Level of consciousness: awake and alert  Post-procedure vital signs: reviewed and stable  Pain management: adequate  Airway patency: patent    PONV status at discharge: No PONV  Anesthetic complications: no      Cardiovascular status: blood pressure returned to baseline  Respiratory status: unassisted  Hydration status: euvolemic  Follow-up not needed.          Vitals Value Taken Time   BP 95/61 02/08/22 1040   Temp 36.5 °C (97.7 °F) 02/08/22 1030   Pulse 74 02/08/22 1041   Resp 10 02/08/22 1014   SpO2 99 % 02/08/22 1041   Vitals shown include unvalidated device data.      Event Time   Out of Recovery 10:57:00         Pain/Alfredo Score: Alfredo Score: 9 (2/8/2022 10:35 AM)

## 2022-02-08 NOTE — TRANSFER OF CARE
Anesthesia Transfer of Care Note    Patient: Saira Osuna    Procedure(s) Performed: Procedure(s) (LRB):  EGD (ESOPHAGOGASTRODUODENOSCOPY) (N/A)    Patient location: PACU    Anesthesia Type: general    Transport from OR: Transported from OR on room air with adequate spontaneous ventilation    Post pain: adequate analgesia    Post assessment: no apparent anesthetic complications    Post vital signs: stable    Level of consciousness: awake    Nausea/Vomiting: no nausea/vomiting    Complications: none    Transfer of care protocol was followed      Last vitals:   Visit Vitals  /66 (BP Location: Left arm, Patient Position: Lying)   Pulse 70   Temp 36.7 °C (98 °F) (Temporal)   Resp 20   Ht 5' (1.524 m)   Wt 45.4 kg (100 lb)   SpO2 97%   Breastfeeding No   BMI 19.53 kg/m²

## 2022-02-08 NOTE — PROVATION PATIENT INSTRUCTIONS
Discharge Summary/Instructions after an Endoscopic Procedure  Patient Name: Saira Osuna  Patient MRN: [id number]  Patient YOB: 1955 Tuesday, February 8, 2022  Joni Murrell MD  Dear patient,  As a result of recent federal legislation (The Federal Cures Act), you may   receive lab or pathology results from your procedure in your [facility]   account before your physician is able to contact you. Your physician or   their representative will relay the results to you with their   recommendations at their soonest availability.  Thank you,  RESTRICTIONS:  During your procedure today, you received medications for sedation.  These   medications may affect your judgment, balance and coordination.  Therefore,   for 24 hours, you have the following restrictions:   - DO NOT drive a car, operate machinery, make legal/financial decisions,   sign important papers or drink alcohol.    ACTIVITY:  Today: no heavy lifting, straining or running due to procedural   sedation/anesthesia.  The following day: return to full activity including work.  DIET:  Eat and drink normally unless instructed otherwise.     TREATMENT FOR COMMON SIDE EFFECTS:  - Mild abdominal pain, nausea, belching, bloating or excessive gas:  rest,   eat lightly and use a heating pad.  - Sore Throat: treat with throat lozenges and/or gargle with warm salt   water.  - Because air was used during the procedure, expelling large amounts of air   from your rectum or belching is normal.  - If a bowel prep was taken, you may not have a bowel movement for 1-3 days.    This is normal.  SYMPTOMS TO WATCH FOR AND REPORT TO YOUR PHYSICIAN:  1. Abdominal pain or bloating, other than gas cramps.  2. Chest pain.  3. Back pain.  4. Signs of infection such as: chills or fever occurring within 24 hours   after the procedure.  5. Rectal bleeding, which would show as bright red, maroon, or black stools.   (A tablespoon of blood from the rectum is not serious, especially if    hemorrhoids are present.)  6. Vomiting.  7. Weakness or dizziness.  GO DIRECTLY TO THE NEAREST EMERGENCY ROOM IF YOU HAVE ANY OF THE FOLLOWING:      Difficulty breathing              Chills and/or fever over 101 F   Persistent vomiting and/or vomiting blood   Severe abdominal pain   Severe chest pain   Black, tarry stools   Bleeding- more than one tablespoon   Any other symptom or condition that you feel may need urgent attention  Your doctor recommends these additional instructions:  If any biopsies were taken, your doctors clinic will contact you in 1 to 2   weeks with any results.  - Patient has a contact number available for emergencies.  The signs and   symptoms of potential delayed complications were discussed with the   patient.  Return to normal activities tomorrow.  Written discharge   instructions were provided to the patient.   - Resume previous diet.   - Continue present medications.   - No aspirin, ibuprofen, naproxen, or other non-steroidal anti-inflammatory   drugs.   - Await pathology results.   - Discharge patient to home (ambulatory).   - Follow an antireflux regimen.   - Return to my office after studies are complete.  For questions, problems or results please call your physician - Joni Murrell MD at Work:  [phone].  [facility], EMERGENCY ROOM PHONE NUMBER: [phone]  IF A COMPLICATION OR EMERGENCY SITUATION ARISES AND YOU ARE UNABLE TO REACH   YOUR PHYSICIAN - GO DIRECTLY TO THE EMERGENCY ROOM.  Joni Murrell MD  2/8/2022 10:18:47 AM  This report has been verified and signed electronically.  Dear patient,  As a result of recent federal legislation (The Federal Cures Act), you may   receive lab or pathology results from your procedure in your [facility]   account before your physician is able to contact you. Your physician or   their representative will relay the results to you with their   recommendations at their soonest availability.  Thank you,  PROVATION

## 2022-02-08 NOTE — DISCHARGE INSTRUCTIONS
Patient Education       Hiatal Hernia   The Basics   Written by the doctors and editors at [facility]   What is a hiatal hernia? -- A hiatal hernia is what doctors call it when a part of the stomach moves up into the chest area. Normally, the stomach sits below the diaphragm, the layer of muscle that separates the organs in the chest from the organs in the belly. The esophagus, the tube that carries food from the mouth to the stomach, passes through a hole in the diaphragm. In people with a hiatal hernia, the stomach pushes up through that hole, too.  There are 2 types of hiatal hernia (figure 1):  · Sliding hernia - A sliding hernia happens when the top of the stomach and the lower part of the esophagus squeeze up into the space above the diaphragm. This is the most common type of hiatal hernia.  · Paraesophageal hernia - A paraesophageal hernia happens when the top of the stomach squeezes up into the space above the diaphragm. This is not very common, but it can be serious if the stomach folds up on itself. It can also cause bleeding from the stomach or trouble breathing.  What are the symptoms of a hiatal hernia? -- Hiatal hernias do not usually cause symptoms. In some cases, though, hiatal hernias cause stomach acid to leak into the esophagus. This is called acid reflux or gastroesophageal reflux, and it can cause symptoms, including:  · Burning in the chest, known as heartburn  · Burning in the throat or an acid taste in the throat  · Stomach or chest pain  · Trouble swallowing  · A raspy voice or a sore throat  · Unexplained cough  Is there a test for hiatal hernia? -- Yes, but doctors do not usually test for hiatal hernia. Instead, most people learn they have a hiatal hernia when they are having tests to find the cause of symptoms, or for other reasons. For instance, some people find out they have a hiatal hernia when they have an X-ray. Others find out when their doctor puts a tube with a tiny camera down  their throat (called an endoscopy) (figure 2).  How are hiatal hernias treated? -- People who have symptoms caused by a hiatal hernia can get treated for their symptoms.  Treatment for symptoms involves taking the medicines that are used for acid reflux (table 1). People with a paraesophageal hernia, and some people with a sliding hernia, need surgery. For this surgery, the surgeon pulls the stomach back down and repairs the hole in the diaphragm so the stomach does not slide up again.  All topics are updated as new evidence becomes available and our peer review process is complete.  This topic retrieved from [employer] video on: Sep 21, 2021.  Topic 53956 Version 8.0  Release: 29.4.2 - C29.263  © 2021 UpToDate, Inc. and/or its affiliates. All rights reserved.  figure 1: Hiatal hernia     A sliding hernia happens when the top of the stomach squeezes up into the space above the diaphragm. This is the most common type of hiatal hernia.  A paraesophageal hernia happens when the top of the stomach folds up against the esophagus, creating a pouch. This is not very common, but it can be serious.  Graphic 93065 Version 4.0    figure 2: Upper endoscopy     During an upper endoscopy, you lie down and the doctor puts a thin tube with a camera and light on the end (called an endoscope) into your mouth and down into your esophagus, stomach, and duodenum (the first part of your small intestine). The camera sends pictures from inside your body to a television screen. That way, your doctor can see the inside of your esophagus, stomach, and duodenum.  Graphic 94082 Version 4.0    table 1: Medicines used to reduce stomach acid  Medicine type  Medicine name examples    Antacids* Calcium carbonate (sample brand names: Maalox, Tums)    Aluminum hydroxide, magnesium hydroxide, and simethicone (sample brand name: Mylanta)   Surface agents Sucralfate (brand name: Carafate)   Histamine blockers¶  Famotidine (brand name: Pepcid)    Cimetidine  (brand name: Tagamet)   Proton pump inhibitors Omeprazole (brand name: Prilosec)    Esomeprazole (brand name: Nexium)    Pantoprazole (brand name: Protonix)    Lansoprazole (brand name: Prevacid)    Dexlansoprazole (brand name: Dexilant)    Rabeprazole (brand name: AcipHex)   Graphic 03065 Version 14.0  Consumer Information Use and Disclaimer   This information is not specific medical advice and does not replace information you receive from your health care provider. This is only a brief summary of general information. It does NOT include all information about conditions, illnesses, injuries, tests, procedures, treatments, therapies, discharge instructions or life-style choices that may apply to you. You must talk with your health care provider for complete information about your health and treatment options. This information should not be used to decide whether or not to accept your health care provider's advice, instructions or recommendations. Only your health care provider has the knowledge and training to provide advice that is right for you. The use of this information is governed by the [employer] End User License Agreement, available at https://www.[employer]/en/solutions/LogRhythm/about/cindy.The use of [employer] content is governed by the [employer] Terms of Use. ©2021 UpToDate, Inc. All rights reserved.  Copyright   © 2021 UpToDate, Inc. and/or its affiliates. All rights reserved.  Patient Education       Upper GI Endoscopy   Why is this procedure done?   This procedure is done to view your upper gastrointestinal (GI) tract. This includes your throat and food pipe (esophagus). It also includes your stomach and the first part of the small bowel. Some people have this test for problems like coughing or throwing up blood. Other people may be having bad belly pain or blood in their stool. You may be having trouble swallowing or problems with acid reflux.  Doctors often use this test to look for problems  like:  · Ulcers  · Cancer or tumor growths  · Internal bleeding  · Swelling  · Inflammation  · Infection  · Dixon's esophagus  · Gastroesophageal reflux disease or GERD  · Swallowing problems     What will the results be?   Your doctor may find the problem inside your body that is causing your signs. The doctor can also treat some problems while doing this procedure. This may include things like stopping bleeding or removing a growth.  What happens before the procedure?   Your doctor will take your history and do an exam. Talk to the doctor about:  · All the drugs you are taking. Be sure to include all prescription, over the counter, vitamins, and herbal supplements. Bring a list of drugs you take with you.  · Tell the doctor if you have any drug allergy.  · Any bleeding problems. Be sure to tell your doctor if you are taking any drugs that may cause bleeding. Some of these are warfarin, rivaroxaban, apixaban, ticagrelor, clopidogrel, ketorolac, ibuprofen, naproxen, or aspirin. Certain vitamins and herbs, such as garlic and fish oil, may also add to the risk for bleeding. You may need to stop these drugs as well. Talk to your doctor about them.  · When you need to stop eating or drinking before your procedure.  You will not be allowed to drive right away after the procedure. Ask a family member or a friend to drive you home.  What happens during the procedure?   · Once you are in the operating room, the staff will put an IV in your arm to give you fluids and drugs. You will be given a drug to make you sleepy. It will also help you stay pain free during the surgery.  · Your doctor may spray a drug in your throat to numb the area.  · You will be asked to lie on your left side. The staff may put a small tube in your nose to help you breathe. Your doctor may place a tool in your mouth to keep it open during the procedure. The staff may place a suction tool in your mouth to lessen saliva flow.  · The doctor will put a  special scope in your mouth and down your food pipe. It is a long, thin tube with lights and a small camera. It sends images to a screen in the operating room where the camera is being used.  · To be able to view the site clearly, gas will be pumped into your belly.  · Your doctor will use the scope to see if there are problems in your upper GI tract. Small tools may be used with the scope to fix any problems that are found. Your doctor may stop an area of bleeding or take out a tumor. The doctor may also remove a growth or take tissue samples for biopsy.  · This procedure takes about 15 to 30 minutes.  What happens after the procedure?   · You will go to the Recovery Room and the staff will watch you closely.  · You will be allowed to go home when you are awake and able to eat and drink.  · You may feel bloated after the procedure. This is from any gas the doctor may have used to help see your GI tract better.  · You may have a sore throat after the procedure. You can drink fluid once the numbing drugs in your throat wear off.  · Ask your doctor when the results will be available. Set up a visit to talk about them.  What drugs may be needed?   The doctor may order drugs to:  · Help with pain  · Decrease the acid in your stomach  What problems could happen?   · Painful swallowing  · Upset stomach  · Injury to food pipe   · Throwing up  · Tear in the esophagus  Where can I learn more?   [employer]  https://gi.org/topics/upper-gi-endoscopy-egd/   Last Reviewed Date   2021-10-05  Consumer Information Use and Disclaimer   This information is not specific medical advice and does not replace information you receive from your health care provider. This is only a brief summary of general information. It does NOT include all information about conditions, illnesses, injuries, tests, procedures, treatments, therapies, discharge instructions or life-style choices that may apply to you. You must talk  with your health care provider for complete information about your health and treatment options. This information should not be used to decide whether or not to accept your health care providers advice, instructions or recommendations. Only your health care provider has the knowledge and training to provide advice that is right for you.  Copyright   Copyright © 2021 [employer] and its affiliates and/or licensors. All rights reserved.

## 2022-02-08 NOTE — ANESTHESIA PREPROCEDURE EVALUATION
02/08/2022  Saira Osuna is a 66 y.o., female.    Anesthesia Evaluation    I have reviewed the Patient Summary Reports.    I have reviewed the Nursing Notes. I have reviewed the NPO Status.   I have reviewed the Medications.     Review of Systems  Anesthesia Hx:  Denies Family Hx of Anesthesia complications.   Denies Personal Hx of Anesthesia complications.   Pulmonary:   COPD, severe Shortness of breath Nocturnal O2   Hepatic/GI:   GERD    Neurological:   Headaches    Psych:   Psychiatric History          Physical Exam  General:  Malnutrition, Cachexia    Airway/Jaw/Neck:  Airway Findings: Mouth Opening: Normal Tongue: Normal  General Airway Assessment: Adult  Mallampati: III  Improves to II with phonation.  TM Distance: Normal, at least 6 cm  Jaw/Neck Findings:  Neck ROM: Normal ROM      Dental:  Dental Findings: Lower partial dentures, Upper Dentures   Chest/Lungs:  Chest/Lungs Findings: Tachypnea     Heart/Vascular:  Heart Findings: Rate: Normal  Rhythm: Regular Rhythm        Mental Status:  Mental Status Findings:  Alert and Oriented         Anesthesia Plan  Type of Anesthesia, risks & benefits discussed:  Anesthesia Type:  general    Patient's Preference:   Plan Factors:          Intra-op Monitoring Plan: standard ASA monitors  Intra-op Monitoring Plan Comments:   Post Op Pain Control Plan:   Post Op Pain Control Plan Comments:     Induction:   IV  Beta Blocker:  Patient is not currently on a Beta-Blocker (No further documentation required).       Informed Consent: Patient understands risks and agrees with Anesthesia plan.  Questions answered. Anesthesia consent signed with patient.  ASA Score: 3     Day of Surgery Review of History & Physical:    H&P update referred to the provider.         Ready For Surgery From Anesthesia Perspective.

## 2022-02-09 NOTE — PLAN OF CARE
Post procedure call:  patient with complaints of discomfort at IV site (right AC). patient states site is red and swollen.  Instructed patient to use a warm compress today and see how it does.  If does not get better or becomes worsened, call us or MD to have evaluated.

## 2022-02-10 LAB
FINAL PATHOLOGIC DIAGNOSIS: NORMAL
GROSS: NORMAL
Lab: NORMAL

## 2022-02-11 ENCOUNTER — TELEPHONE (OUTPATIENT)
Dept: GASTROENTEROLOGY | Facility: CLINIC | Age: 67
End: 2022-02-11
Payer: OTHER GOVERNMENT

## 2022-02-11 NOTE — TELEPHONE ENCOUNTER
----- Message from Joni Burnett MD sent at 2/10/2022  4:29 PM CST -----  Please notify patient that biopsies reviewed and showed no bacteria.  Continue current meds and follow up as previously planned.

## 2022-05-02 ENCOUNTER — OFFICE VISIT (OUTPATIENT)
Dept: GASTROENTEROLOGY | Facility: CLINIC | Age: 67
End: 2022-05-02
Payer: OTHER GOVERNMENT

## 2022-05-02 ENCOUNTER — TELEPHONE (OUTPATIENT)
Dept: PSYCHIATRY | Facility: CLINIC | Age: 67
End: 2022-05-02
Payer: OTHER GOVERNMENT

## 2022-05-02 VITALS
SYSTOLIC BLOOD PRESSURE: 109 MMHG | WEIGHT: 101.19 LBS | HEART RATE: 81 BPM | BODY MASS INDEX: 19.87 KG/M2 | HEIGHT: 60 IN | DIASTOLIC BLOOD PRESSURE: 59 MMHG

## 2022-05-02 DIAGNOSIS — R13.10 DYSPHAGIA, UNSPECIFIED TYPE: ICD-10-CM

## 2022-05-02 DIAGNOSIS — R19.5 POSITIVE OCCULT STOOL BLOOD TEST: Primary | ICD-10-CM

## 2022-05-02 DIAGNOSIS — D64.9 ANEMIA, UNSPECIFIED TYPE: ICD-10-CM

## 2022-05-02 DIAGNOSIS — K44.9 HIATAL HERNIA: ICD-10-CM

## 2022-05-02 DIAGNOSIS — Z87.19 HISTORY OF GASTROESOPHAGEAL REFLUX (GERD): ICD-10-CM

## 2022-05-02 DIAGNOSIS — K59.00 CONSTIPATION, UNSPECIFIED CONSTIPATION TYPE: ICD-10-CM

## 2022-05-02 DIAGNOSIS — R19.8 IRREGULAR BOWEL HABITS: ICD-10-CM

## 2022-05-02 PROCEDURE — 99999 PR PBB SHADOW E&M-EST. PATIENT-LVL V: ICD-10-PCS | Mod: PBBFAC,,,

## 2022-05-02 PROCEDURE — 99214 OFFICE O/P EST MOD 30 MIN: CPT | Mod: S$PBB,,,

## 2022-05-02 PROCEDURE — 99214 PR OFFICE/OUTPT VISIT, EST, LEVL IV, 30-39 MIN: ICD-10-PCS | Mod: S$PBB,,,

## 2022-05-02 PROCEDURE — 99215 OFFICE O/P EST HI 40 MIN: CPT | Mod: PBBFAC,PN

## 2022-05-02 PROCEDURE — 99999 PR PBB SHADOW E&M-EST. PATIENT-LVL V: CPT | Mod: PBBFAC,,,

## 2022-05-02 NOTE — TELEPHONE ENCOUNTER
----- Message from Aziza Aponte sent at 5/2/2022  1:39 PM CDT -----  Regarding: RE: [facility]  Sidney Ibarra, it is for therapy.   ----- Message -----  From: Stacey Dixon MA  Sent: 4/29/2022  11:34 AM CDT  To: Aziza Aponte  Subject: RE: [facility] REFERRAL                                  THANKS   Will await to hear back  Thanks  Stacey FOREMAN  [facility] [address], [address]  [facility] [address]  Phone: [phone] option 3  Fax: [phone]       ----- Message -----  From: Aziza Aponte  Sent: 4/29/2022   9:18 AM CDT  To: Stacey Dixon MA  Subject: RE: [facility] REFERRAL                                  Thank you Stacey. [facility] with the [facility].  Aziza  ----- Message -----  From: Stacey Dixon MA  Sent: 4/29/2022   9:07 AM CDT  To: Aziza Aponte  Subject: RE: [facility] REFERRAL                                  Good Morning  Please let us know if this will be for therapy or medication management?  As we will have a medication management provider taking new patients as of June.  If therapy we can see what the first available is and get them scheduled.  Look forward to hearing back from you soon  Have a Great Friday and weekend  Thanks  Stacey FOREMAN  [facility] [address], [address]  [facility] [address]  Phone: [phone] option 3  Fax: [phone]      ----- Message -----  From: Aziza Aponte  Sent: 4/29/2022   8:59 AM CDT  To: , #  Subject: [facility] REFERRAL                                      Good morning.     Dr. Stacey Overton would like to refer the following patient to Psychiatry. 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 I have scanned the patients referral and records into media manager.       Thank you,   [facility]

## 2022-05-02 NOTE — PROGRESS NOTES
Subjective:       Patient ID: Saira Osuna is a 67 y.o. female Body mass index is 19.76 kg/m².    Chief Complaint: Rectal Bleeding    Established patient of Dr. Burnett and myself     GI Problem  The primary symptoms include fatigue (chronic). Primary symptoms do not include fever, weight loss, abdominal pain, nausea, vomiting, diarrhea, melena, hematemesis, jaundice, hematochezia, dysuria or rash.   The illness is also significant for dysphagia (occurs with solid foods including breads) and constipation (reports having 5 bowel movements a week after taking biscodyl & Senna OTC PRN; rated stool a 1, 2, 4 on bristol scale; denies straining or rectal pain). The illness does not include chills, anorexia, odynophagia or bloating. Significant associated medical issues include GERD (well managed on omeprazole 40 mg once daily; currently taking celebrex as directed) and hemorrhoids. Associated medical issues do not include inflammatory bowel disease, gallstones, liver disease, alcohol abuse, PUD, gastric bypass, bowel resection, irritable bowel syndrome or diverticulitis. Associated medical issues comments: EGD 02/08/2022 - [place]-colored mucosa suggestive of Dixon's, esophagus, Schatzki's ring, hiatal hernia, & erythematous mucosa in the prepyloric region of the stomach; patho: benign, no bacteria.      Review of Systems   Constitutional: Positive for fatigue (chronic). Negative for activity change, appetite change, chills, diaphoresis, fever, unexpected weight change and weight loss.   HENT: Positive for trouble swallowing. Negative for sore throat.    Respiratory: Negative for cough, choking and shortness of breath.    Cardiovascular: Negative for chest pain.   Gastrointestinal: Positive for blood in stool (positive occult stool 04/08/2022), constipation (reports having 5 bowel movements a week after taking biscodyl & Senna OTC PRN; rated stool a 1, 2, 4 on bristol scale; denies straining or rectal pain) and dysphagia  (occurs with solid foods including breads). Negative for abdominal distention, abdominal pain, anal bleeding, anorexia, bloating, diarrhea, hematemesis, hematochezia, jaundice, melena, nausea, rectal pain and vomiting.   Genitourinary: Negative for dysuria.   Skin: Negative for rash.       No LMP recorded. Patient is postmenopausal.  Past Medical History:   Diagnosis Date    Constipation     Dependence on nocturnal oxygen therapy     2 liters/ nasal cannula    Depression     Emphysema lung     Encounter for blood transfusion     GERD (gastroesophageal reflux disease)     Migraines     Neck pain      Past Surgical History:   Procedure Laterality Date    AUGMENTATION OF BREAST      CERVICAL FUSION      COLONOSCOPY      ENDOSCOPIC RELEASE OF BOTH CARPAL TUNNELS      ESOPHAGEAL DILATION      ESOPHAGOGASTRODUODENOSCOPY N/A 2022    Procedure: EGD (ESOPHAGOGASTRODUODENOSCOPY);  Surgeon: Joni Burnett MD;  Location[facility];  Service: Endoscopy;  Laterality: N/A;    INTERPOSITION ARTHROPLASTY OF CARPOMETACARPAL JOINTS Right     thumb    TONSILLECTOMY      TOTAL HIP ARTHROPLASTY Bilateral     UPPER GASTROINTESTINAL ENDOSCOPY       Family History   Problem Relation Age of Onset    Breast cancer Maternal Aunt     Colon cancer Neg Hx     Colon polyps Neg Hx     Crohn's disease Neg Hx     Ulcerative colitis Neg Hx     Stomach cancer Neg Hx     Rectal cancer Neg Hx      Social History     Tobacco Use    Smoking status: Former Smoker     Quit date: 1990     Years since quittin.3    Smokeless tobacco: Never Used   Substance Use Topics    Alcohol use: Yes     Comment: rarely     Wt Readings from Last 10 Encounters:   22 45.9 kg (101 lb 3.1 oz)   22 45.4 kg (100 lb)   22 45.1 kg (99 lb 6.8 oz)   22 46 kg (101 lb 6.6 oz)   10/29/21 45.4 kg (100 lb)   10/28/21 46.2 kg (101 lb 13.6 oz)     Lab Results   Component Value Date    WBC 7.04 10/28/2021    HGB 10.2 (L)  10/28/2021    HCT 31.3 (L) 10/28/2021    MCV 91 10/28/2021     10/28/2021     CMP  Sodium   Date Value Ref Range Status   01/26/2022 140 136 - 145 mmol/L Final     Potassium   Date Value Ref Range Status   01/26/2022 4.3 3.5 - 5.1 mmol/L Final     Chloride   Date Value Ref Range Status   01/26/2022 104 95 - 110 mmol/L Final     CO2   Date Value Ref Range Status   01/26/2022 29 23 - 29 mmol/L Final     Glucose   Date Value Ref Range Status   01/26/2022 73 70 - 110 mg/dL Final     BUN   Date Value Ref Range Status   01/26/2022 18 8 - 23 mg/dL Final     Creatinine   Date Value Ref Range Status   01/26/2022 0.7 0.5 - 1.4 mg/dL Final     Calcium   Date Value Ref Range Status   01/26/2022 9.0 8.7 - 10.5 mg/dL Final     Total Protein   Date Value Ref Range Status   01/26/2022 6.8 6.0 - 8.4 g/dL Final     Albumin   Date Value Ref Range Status   01/26/2022 3.8 3.5 - 5.2 g/dL Final     Total Bilirubin   Date Value Ref Range Status   01/26/2022 0.3 0.1 - 1.0 mg/dL Final     Comment:     For infants and newborns, interpretation of results should be based  on gestational age, weight and in agreement with clinical  observations.    Premature Infant recommended reference ranges:  Up to 24 hours.............<8.0 mg/dL  Up to 48 hours............<12.0 mg/dL  3-5 days..................<15.0 mg/dL  6-29 days.................<15.0 mg/dL       Alkaline Phosphatase   Date Value Ref Range Status   01/26/2022 64 55 - 135 U/L Final     AST   Date Value Ref Range Status   01/26/2022 23 10 - 40 U/L Final     ALT   Date Value Ref Range Status   01/26/2022 19 10 - 44 U/L Final     Anion Gap   Date Value Ref Range Status   01/26/2022 7 (L) 8 - 16 mmol/L Final     eGFR if    Date Value Ref Range Status   01/26/2022 >60.0 >60 mL/min/1.73 m^2 Final     eGFR if non    Date Value Ref Range Status   01/26/2022 >60.0 >60 mL/min/1.73 m^2 Final     Comment:     Calculation used to obtain the estimated glomerular  filtration  rate (eGFR) is the CKD-EPI equation.          Lab Results   Component Value Date    TSH 1.379 10/28/2021     Reviewed prior medical [facility] & endoscopy history of EGD 02/08/2022 (see surgical history).    Objective:      Physical Exam  Vitals and nursing note reviewed.   Constitutional:       General: She is not in acute distress.     Appearance: Normal appearance. She is normal weight. She is not ill-appearing.   HENT:      Mouth/Throat:      Comments: Unable to assess due to COVID-19 concerns.  Eyes:      Extraocular Movements: Extraocular movements intact.      Pupils: Pupils are equal, round, and reactive to light.   Cardiovascular:      Rate and Rhythm: Normal rate and regular rhythm.   Pulmonary:      Effort: Pulmonary effort is normal. No respiratory distress.      Breath sounds: Normal breath sounds.   Abdominal:      General: Abdomen is flat. Bowel sounds are normal. There is no distension or abdominal bruit. There are no signs of injury.      Palpations: Abdomen is soft. There is no shifting dullness, fluid wave, splenomegaly or mass.      Tenderness: There is no abdominal tenderness. There is no guarding or rebound. Negative signs include Machado's sign, Rovsing's sign and McBurney's sign.      Hernia: No hernia is present.   Skin:     General: Skin is warm and dry.      Coloration: Skin is not jaundiced or pale.   Neurological:      Mental Status: She is alert and oriented to person, place, and time.   Psychiatric:         Attention and Perception: Attention normal.         Mood and Affect: Mood normal.         Speech: Speech normal.         Behavior: Behavior normal.         Assessment:       1. Positive occult stool blood test    2. Anemia, unspecified type    3. Dysphagia, unspecified type    4. Constipation, unspecified constipation type    5. Irregular bowel habits    6. History of gastroesophageal reflux (GERD)    7. Hiatal hernia        Plan:        Positive occult stool blood test  - schedule Colonoscopy, discussed procedure with the patient, including risks and benefits, patient verbalized understanding  -     IRON AND TIBC; Future; Expected date: 05/02/2022  -     Ferritin; Future; Expected date: 05/02/2022    Anemia, unspecified type  - schedule Colonoscopy, discussed procedure with the patient, including risks and benefits, patient verbalized understanding  - discussed with patient the different ways that anemia occurs: blood loss (such as from the gi tract), the body is not making enough, or the body is breaking down the rbcs too quickly; recommend colonoscopy to further evaluate gi tract for possible blood loss and pending results of endoscopy, possible UGI with Small Bowel Follow Through/video capsule study  -follow-up with PCP and/or hematology for continued evaluation and management  -     IRON AND TIBC; Future; Expected date: 05/02/2022  -     Ferritin; Future; Expected date: 05/02/2022    Dysphagia, unspecified type  -     Fl Modified Barium Swallow Speech; Future; Expected date: 05/02/2022  -     SLP video swallow; Future; Expected date: 05/02/2022    Constipation, unspecified constipation type & Irregular bowel habits  - schedule Colonoscopy, discussed procedure with the patient, including risks and benefits, patient verbalized understanding  -Recommend daily exercise as tolerated, adequate water intake (six 8-oz glasses of water daily), and high fiber diet. OTC fiber supplements are recommended if diet does not reach daily fiber goal (20-30 grams daily), such as Metamucil, Citrucel, or FiberCon (take as directed, separate from other oral medications by >2 hours).  -Recommend taking an OTC stool softener such as Colace as directed to avoid hard stools and straining with bowel movements PRN  -Recommend trying OTC MiraLax once daily (17g PO) as directed  - If no improvement with above recommendations, try intermittently dosed Dulcolax OTC as  directed (every 3-4  days) PRN to facilitate bowel movements  -If still no improvement with these measures, call/follow-up    History of gastroesophageal reflux (GERD)  -discussed about the different types of medications used to treat reflux and how to use them, antacids can be used PRN for breakthrough heartburn symptoms by reducing stomach acid that is already produced, H2 blockers work by limiting the amount acid production, & PPI's work to block acid production and are taken daily, patient verbalized understanding.  -Educated patient on lifestyle modifications to help control/reduce reflux/abdominal pain including: avoid large meals, avoid eating within 2-3 hours of bedtime (avoid late night eating & lying down soon after eating), elevate head of bed if nocturnal symptoms are present, smoking cessation (if current smoker), & weight loss (if overweight).   -Educated to avoid known foods which trigger reflux symptoms & to minimize/avoid high-fat foods, chocolate, caffeine, citrus, alcohol, & tomato products.  -Advised to avoid/limit use of NSAID's, since they can cause GI upset, bleeding, and/or ulcers. If needed, take with food.   -CONTINUE: Omeprazole 40 mg daily 30-60 minutes before first meal of the day on empty stomach    Hiatal hernia  -discussed diagnosis with patient & that it is usually managed by controlling reflux symptoms, surgery is an option, but usually performed if reflux is uncontrolled by medication management and lifestyle/dietary modifications; if symptoms persist despite medication management and lifestyle/dietary modifications, we can refer to general surgery to consult about surgical options, patient verbalized understanding    Follow up in about 4 weeks (around 5/30/2022), or if symptoms worsen or fail to improve.      If no improvement in symptoms or symptoms worsen, call/follow-up at clinic or go to ER.        30 minutes of total time spent on the encounter, which includes face to face  time and non-face to face time preparing to see the patient (eg, review of tests), Obtaining and/or reviewing separately obtained history, Documenting clinical information in the electronic or other health record, Independently interpreting results (not separately reported) and communicating results to the patient/family/caregiver, or Care coordination (not separately reported).

## 2022-05-05 NOTE — TELEPHONE ENCOUNTER
Called to speak to patient to schedule new patient appointment and she stated she needs to call back someone was at her door.  Will await her call back to schedule her

## 2022-05-12 ENCOUNTER — LAB VISIT (OUTPATIENT)
Dept: LAB | Facility: HOSPITAL | Age: 67
End: 2022-05-12
Payer: OTHER GOVERNMENT

## 2022-05-12 DIAGNOSIS — D64.9 ANEMIA, UNSPECIFIED TYPE: ICD-10-CM

## 2022-05-12 DIAGNOSIS — R19.5 POSITIVE OCCULT STOOL BLOOD TEST: ICD-10-CM

## 2022-05-12 LAB — FERRITIN SERPL-MCNC: 61 NG/ML (ref 20–300)

## 2022-05-12 PROCEDURE — 36415 COLL VENOUS BLD VENIPUNCTURE: CPT

## 2022-05-12 PROCEDURE — 82728 ASSAY OF FERRITIN: CPT

## 2022-05-12 PROCEDURE — 84466 ASSAY OF TRANSFERRIN: CPT

## 2022-05-13 LAB
IRON SERPL-MCNC: 78 UG/DL (ref 30–160)
SATURATED IRON: 19 % (ref 20–50)
TOTAL IRON BINDING CAPACITY: 404 UG/DL (ref 250–450)
TRANSFERRIN SERPL-MCNC: 273 MG/DL (ref 200–375)

## 2022-06-02 ENCOUNTER — ANESTHESIA (OUTPATIENT)
Dept: ENDOSCOPY | Facility: HOSPITAL | Age: 67
End: 2022-06-02
Payer: OTHER GOVERNMENT

## 2022-06-02 ENCOUNTER — ANESTHESIA EVENT (OUTPATIENT)
Dept: ENDOSCOPY | Facility: HOSPITAL | Age: 67
End: 2022-06-02
Payer: OTHER GOVERNMENT

## 2022-06-02 ENCOUNTER — HOSPITAL ENCOUNTER (OUTPATIENT)
Facility: HOSPITAL | Age: 67
Discharge: HOME | End: 2022-06-02
Attending: INTERNAL MEDICINE | Admitting: INTERNAL MEDICINE
Payer: OTHER GOVERNMENT

## 2022-06-02 DIAGNOSIS — R19.5 OCCULT BLOOD POSITIVE STOOL: ICD-10-CM

## 2022-06-02 DIAGNOSIS — K63.5 POLYP OF COLON, UNSPECIFIED PART OF COLON, UNSPECIFIED TYPE: Primary | ICD-10-CM

## 2022-06-02 PROCEDURE — 25000003 PHARM REV CODE 250: Performed by: INTERNAL MEDICINE

## 2022-06-02 PROCEDURE — D9220A PRA ANESTHESIA: Mod: CRNA,,, | Performed by: NURSE ANESTHETIST, CERTIFIED REGISTERED

## 2022-06-02 PROCEDURE — 27201089 HC SNARE, DISP (ANY): Performed by: INTERNAL MEDICINE

## 2022-06-02 PROCEDURE — 45381 COLONOSCOPY SUBMUCOUS NJX: CPT | Performed by: INTERNAL MEDICINE

## 2022-06-02 PROCEDURE — D9220A PRA ANESTHESIA: ICD-10-PCS | Mod: ANES,,, | Performed by: ANESTHESIOLOGY

## 2022-06-02 PROCEDURE — 63600175 PHARM REV CODE 636 W HCPCS: Performed by: NURSE ANESTHETIST, CERTIFIED REGISTERED

## 2022-06-02 PROCEDURE — 45381 COLONOSCOPY SUBMUCOUS NJX: CPT | Mod: 51,,, | Performed by: INTERNAL MEDICINE

## 2022-06-02 PROCEDURE — 27201012 HC FORCEPS, HOT/COLD, DISP: Performed by: INTERNAL MEDICINE

## 2022-06-02 PROCEDURE — D9220A PRA ANESTHESIA: ICD-10-PCS | Mod: CRNA,,, | Performed by: NURSE ANESTHETIST, CERTIFIED REGISTERED

## 2022-06-02 PROCEDURE — 45380 PR COLONOSCOPY,BIOPSY: ICD-10-PCS | Mod: 59,,, | Performed by: INTERNAL MEDICINE

## 2022-06-02 PROCEDURE — 45381 PR COLONOSCPY,FLEX,W/DIR SUBMUC INJECT: ICD-10-PCS | Mod: 51,,, | Performed by: INTERNAL MEDICINE

## 2022-06-02 PROCEDURE — 37000009 HC ANESTHESIA EA ADD 15 MINS: Performed by: INTERNAL MEDICINE

## 2022-06-02 PROCEDURE — 45380 COLONOSCOPY AND BIOPSY: CPT | Mod: 59 | Performed by: INTERNAL MEDICINE

## 2022-06-02 PROCEDURE — 37000008 HC ANESTHESIA 1ST 15 MINUTES: Performed by: INTERNAL MEDICINE

## 2022-06-02 PROCEDURE — 88305 TISSUE EXAM BY PATHOLOGIST: ICD-10-PCS | Mod: 26,,, | Performed by: PATHOLOGY

## 2022-06-02 PROCEDURE — 45380 COLONOSCOPY AND BIOPSY: CPT | Mod: 59,,, | Performed by: INTERNAL MEDICINE

## 2022-06-02 PROCEDURE — 27201028 HC NEEDLE, SCLERO: Performed by: INTERNAL MEDICINE

## 2022-06-02 PROCEDURE — 00811 ANES LWR INTST NDSC NOS: CPT | Performed by: INTERNAL MEDICINE

## 2022-06-02 PROCEDURE — D9220A PRA ANESTHESIA: Mod: ANES,,, | Performed by: ANESTHESIOLOGY

## 2022-06-02 PROCEDURE — 88305 TISSUE EXAM BY PATHOLOGIST: CPT | Performed by: PATHOLOGY

## 2022-06-02 PROCEDURE — 45385 COLONOSCOPY W/LESION REMOVAL: CPT | Performed by: INTERNAL MEDICINE

## 2022-06-02 PROCEDURE — 27200997: Performed by: INTERNAL MEDICINE

## 2022-06-02 PROCEDURE — 45385 PR COLONOSCOPY,REMV LESN,SNARE: ICD-10-PCS | Mod: ,,, | Performed by: INTERNAL MEDICINE

## 2022-06-02 PROCEDURE — 45385 COLONOSCOPY W/LESION REMOVAL: CPT | Mod: ,,, | Performed by: INTERNAL MEDICINE

## 2022-06-02 PROCEDURE — 88305 TISSUE EXAM BY PATHOLOGIST: CPT | Mod: 26,,, | Performed by: PATHOLOGY

## 2022-06-02 RX ORDER — PROPOFOL 10 MG/ML
VIAL (ML) INTRAVENOUS
Status: DISCONTINUED | OUTPATIENT
Start: 2022-06-02 | End: 2022-06-02

## 2022-06-02 RX ORDER — SODIUM CHLORIDE 9 MG/ML
INJECTION, SOLUTION INTRAVENOUS CONTINUOUS
Status: DISCONTINUED | OUTPATIENT
Start: 2022-06-02 | End: 2022-06-02 | Stop reason: HOSPADM

## 2022-06-02 RX ADMIN — SODIUM CHLORIDE: 0.9 INJECTION, SOLUTION INTRAVENOUS at 09:06

## 2022-06-02 RX ADMIN — PROPOFOL 40 MG: 10 INJECTION, EMULSION INTRAVENOUS at 11:06

## 2022-06-02 RX ADMIN — PROPOFOL 120 MG: 10 INJECTION, EMULSION INTRAVENOUS at 11:06

## 2022-06-02 NOTE — TRANSFER OF CARE
Anesthesia Transfer of Care Note    Patient: Saira Osuna    Procedure(s) Performed: Procedure(s) (LRB):  COLONOSCOPY (N/A)    Patient location: PACU    Anesthesia Type: general    Transport from OR: Transported from OR on room air with adequate spontaneous ventilation    Post pain: adequate analgesia    Post assessment: no apparent anesthetic complications    Post vital signs: stable    Level of consciousness: awake    Nausea/Vomiting: no nausea/vomiting    Complications: none    Transfer of care protocol was followed      Last vitals:   Visit Vitals  /68   Pulse 63   Temp 37.3 °C (99.1 °F) (Skin)   Resp (!) 25   SpO2 100%   Breastfeeding No

## 2022-06-02 NOTE — DISCHARGE INSTRUCTIONS
"Discharge Instructions: After Your Surgery/Procedure  Youve just had surgery. During surgery you were given medicine called anesthesia to keep you relaxed and free of pain. After surgery you may have some pain or nausea. This is common. Here are some tips for feeling better and getting well after surgery.     Stay on schedule with your medication.   Going home  Your doctor or nurse will show you how to take care of yourself when you go home. He or she will also answer your questions. Have an adult family member or friend drive you home.      For your safety we recommend these precaution for the first 24 hours after your procedure:  Do not drive or use heavy equipment.  Do not make important decisions or sign legal papers.  Do not drink alcohol.  Have someone stay with you, if needed. He or she can watch for problems and help keep you safe.  Your concentration, balance, coordination, and judgement may be impaired for many hours after anesthesia.  Use caution when ambulating or standing up.     You may feel weak and "washed out" after anesthesia and surgery.      Subtle residual effects of general anesthesia or sedation with regional / local anesthesia can last more than 24 hours.  Rest for the remainder of the day or longer if your Doctor/Surgeon has advised you to do so.  Although you may feel normal within the first 24 hours, your reflexes and mental ability may be impaired without you realizing it.  You may feel dizzy, lightheaded or sleepy for 24 hours or longer.      Be sure to go to all follow-up visits with your doctor. And rest after your surgery for as long as your doctor tells you to.  Coping with pain  If you have pain after surgery, pain medicine will help you feel better. Take it as told, before pain becomes severe. Also, ask your doctor or pharmacist about other ways to control pain. This might be with heat, ice, or relaxation. And follow any other instructions your surgeon or nurse gives you.  Tips " for taking pain medicine  To get the best relief possible, remember these points:  Pain medicines can upset your stomach. Taking them with a little food may help.  Most pain relievers taken by mouth need at least 20 to 30 minutes to start to work.  Taking medicine on a schedule can help you remember to take it. Try to time your medicine so that you can take it before starting an activity. This might be before you get dressed, go for a walk, or sit down for dinner.  Constipation is a common side effect of pain medicines. Call your doctor before taking any medicines such as laxatives or stool softeners to help ease constipation. Also ask if you should skip any foods. Drinking lots of fluids and eating foods such as fruits and vegetables that are high in fiber can also help. Remember, do not take laxatives unless your surgeon has prescribed them.  Drinking alcohol and taking pain medicine can cause dizziness and slow your breathing. It can even be deadly. Do not drink alcohol while taking pain medicine.  Pain medicine can make you react more slowly to things. Do not drive or run machinery while taking pain medicine.  Your health care provider may tell you to take acetaminophen to help ease your pain. Ask him or her how much you are supposed to take each day. Acetaminophen or other pain relievers may interact with your prescription medicines or other over-the-counter (OTC) drugs. Some prescription medicines have acetaminophen and other ingredients. Using both prescription and OTC acetaminophen for pain can cause you to overdose. Read the labels on your OTC medicines with care. This will help you to clearly know the list of ingredients, how much to take, and any warnings. It may also help you not take too much acetaminophen. If you have questions or do not understand the information, ask your pharmacist or health care provider to explain it to you before you take the OTC medicine.  Managing nausea  Some people have an  upset stomach after surgery. This is often because of anesthesia, pain, or pain medicine, or the stress of surgery. These tips will help you handle nausea and eat healthy foods as you get better. If you were on a special food plan before surgery, ask your doctor if you should follow it while you get better. These tips may help:  Do not push yourself to eat. Your body will tell you when to eat and how much.  Start off with clear liquids and soup. They are easier to digest.  Next try semi-solid foods, such as mashed potatoes, applesauce, and gelatin, as you feel ready.  Slowly move to solid foods. Dont eat fatty, rich, or spicy foods at first.  Do not force yourself to have 3 large meals a day. Instead eat smaller amounts more often.  Take pain medicines with a small amount of solid food, such as crackers or toast, to avoid nausea.     Call your surgeon if  You still have pain an hour after taking medicine. The medicine may not be strong enough.  You feel too sleepy, dizzy, or groggy. The medicine may be too strong.  You have side effects like nausea, vomiting, or skin changes, such as rash, itching, or hives.       If you have obstructive sleep apnea  You were given anesthesia medicine during surgery to keep you comfortable and free of pain. After surgery, you may have more apnea spells because of this medicine and other medicines you were given. The spells may last longer than usual.   At home:  Keep using the continuous positive airway pressure (CPAP) device when you sleep. Unless your health care provider tells you not to, use it when you sleep, day or night. CPAP is a common device used to treat obstructive sleep apnea.  Talk with your provider before taking any pain medicine, muscle relaxants, or sedatives. Your provider will tell you about the possible dangers of taking these medicines.  © 9520-7688 [employer]. [address] [place] [address], [place] [address]. All rights reserved. This information is  not intended as a substitute for professional medical care. Always follow your healthcare professional's instructions.

## 2022-06-02 NOTE — H&P
CC: FOBT +    67 year old female with above. States that symptoms are absent, no alleviating/exacerbating factors. No family history of colorectal CA. No personal history of polyps. No overt bleeding or weight loss.     ROS:  No headache, no fever/chills, no chest pain/SOB, no nausea/vomiting/diarrhea/constipation/GI bleeding/abdominal pain, no dysuria/hematuria.    VSSAF   Exam:   Alert and oriented x 3; no apparent distress   PERRLA, sclera anicteric  CV: Regular rate/rhythm, normal PMI   Lungs: Clear bilaterally with no wheeze/rales   Abdomen: Soft, NT/ND, normal bowel sounds   Ext: No cyanosis, clubbing     Impression:   As above    Plan:   Proceed with endoscopy. Further recs to follow.

## 2022-06-02 NOTE — ANESTHESIA PREPROCEDURE EVALUATION
06/02/2022  Saira Osuna is a 67 y.o., female.    Pre-op Assessment    I have reviewed the Patient Summary Reports.    I have reviewed the Nursing Notes. I have reviewed the NPO Status.   I have reviewed the Medications.     Review of Systems  Anesthesia Hx:  Denies Family Hx of Anesthesia complications.   Denies Personal Hx of Anesthesia complications.   Social:  Former Smoker    Pulmonary:   COPD, severe Shortness of breath Nocturnal O2   Hepatic/GI:   Bowel Prep. GERD    Neurological:   Headaches    Psych:   Psychiatric History          Physical Exam  General:  Well nourished      Airway/Jaw/Neck:  Airway Findings: Mouth Opening: Normal   Tongue: Normal   General Airway Assessment: Adult Mallampati: III  Improves to II with phonation.  TM Distance: Normal, at least 6 cm   Jaw/Neck Findings:  Neck ROM: Normal ROM       Dental:  Dental Findings: Lower partial dentures, Upper Dentures     Chest/Lungs:  Chest/Lungs Findings: Tachypnea      Heart/Vascular:  Heart Findings: Rate: Normal  Rhythm: Regular Rhythm        Mental Status:  Mental Status Findings:  Alert and Oriented         Anesthesia Plan  Type of Anesthesia, risks & benefits discussed:  Anesthesia Type:  general    Patient's Preference:   Plan Factors:          Intra-op Monitoring Plan: standard ASA monitors  Intra-op Monitoring Plan Comments:   Post Op Pain Control Plan:   Post Op Pain Control Plan Comments:     Induction:   IV  Beta Blocker:  Patient is not currently on a Beta-Blocker (No further documentation required).       Informed Consent: Informed consent signed with the Patient and all parties understand the risks and agree with anesthesia plan.  All questions answered.  Anesthesia consent signed with patient.  ASA Score: 3     Day of Surgery Review of History & Physical:    H&P Update referred to the surgeon/provider.          Ready For  Surgery From Anesthesia Perspective.           Physical Exam  General: Well nourished    Airway:  Mallampati: III / II  Mouth Opening: Normal  TM Distance: Normal, at least 6 cm  Tongue: Normal  Neck ROM: Normal ROM    Dental:  Lower partial dentures, Upper Dentures    Chest/Lungs:  Tachypnea    Heart:  Rate: Normal  Rhythm: Regular Rhythm          Anesthesia Plan  Type of Anesthesia, risks & benefits discussed:    Anesthesia Type: general  Intra-op Monitoring Plan: standard ASA monitors  Induction:  IV  Informed Consent: Informed consent signed with the Patient and all parties understand the risks and agree with anesthesia plan.  All questions answered.   ASA Score: 3  Day of Surgery Review of History & Physical: H&P Update referred to the surgeon/provider.    Ready For Surgery From Anesthesia Perspective.       .

## 2022-06-02 NOTE — ANESTHESIA POSTPROCEDURE EVALUATION
Anesthesia Post Evaluation    Patient: Saira Osuna    Procedure(s) Performed: Procedure(s) (LRB):  COLONOSCOPY (N/A)    Final Anesthesia Type: general      Patient location during evaluation: PACU  Patient participation: Yes- Able to Participate  Level of consciousness: awake and alert  Post-procedure vital signs: reviewed and stable  Pain management: adequate  Airway patency: patent    PONV status at discharge: No PONV  Anesthetic complications: no      Cardiovascular status: hemodynamically stable  Respiratory status: unassisted and room air  Hydration status: euvolemic  Follow-up not needed.          Vitals Value Taken Time   BP 97/62 06/02/22 1145   Temp 36.6 °C (97.8 °F) 06/02/22 1140   Pulse 65 06/02/22 1140   Resp 14 06/02/22 1140   SpO2 100 % 06/02/22 1140   Vitals shown include unvalidated device data.      No case tracking events are documented in the log.      Pain/Alfredo Score: No data recorded

## 2022-06-02 NOTE — PROVATION PATIENT INSTRUCTIONS
Discharge Summary/Instructions after an Endoscopic Procedure  Patient Name: Saira Osuna  Patient MRN: [id number]  Patient YOB: 1955 Thursday, June 2, 2022  Joni Murrell MD  Dear patient,  As a result of recent federal legislation (The Federal Cures Act), you may   receive lab or pathology results from your procedure in your [facility]   account before your physician is able to contact you. Your physician or   their representative will relay the results to you with their   recommendations at their soonest availability.  Thank you,  RESTRICTIONS:  During your procedure today, you received medications for sedation.  These   medications may affect your judgment, balance and coordination.  Therefore,   for 24 hours, you have the following restrictions:   - DO NOT drive a car, operate machinery, make legal/financial decisions,   sign important papers or drink alcohol.    ACTIVITY:  Today: no heavy lifting, straining or running due to procedural   sedation/anesthesia.  The following day: return to full activity including work.  DIET:  Eat and drink normally unless instructed otherwise.     TREATMENT FOR COMMON SIDE EFFECTS:  - Mild abdominal pain, nausea, belching, bloating or excessive gas:  rest,   eat lightly and use a heating pad.  - Sore Throat: treat with throat lozenges and/or gargle with warm salt   water.  - Because air was used during the procedure, expelling large amounts of air   from your rectum or belching is normal.  - If a bowel prep was taken, you may not have a bowel movement for 1-3 days.    This is normal.  SYMPTOMS TO WATCH FOR AND REPORT TO YOUR PHYSICIAN:  1. Abdominal pain or bloating, other than gas cramps.  2. Chest pain.  3. Back pain.  4. Signs of infection such as: chills or fever occurring within 24 hours   after the procedure.  5. Rectal bleeding, which would show as bright red, maroon, or black stools.   (A tablespoon of blood from the rectum is not serious, especially if    hemorrhoids are present.)  6. Vomiting.  7. Weakness or dizziness.  GO DIRECTLY TO THE NEAREST EMERGENCY ROOM IF YOU HAVE ANY OF THE FOLLOWING:      Difficulty breathing              Chills and/or fever over 101 F   Persistent vomiting and/or vomiting blood   Severe abdominal pain   Severe chest pain   Black, tarry stools   Bleeding- more than one tablespoon   Any other symptom or condition that you feel may need urgent attention  Your doctor recommends these additional instructions:  If any biopsies were taken, your doctors clinic will contact you in 1 to 2   weeks with any results.  - Patient has a contact number available for emergencies.  The signs and   symptoms of potential delayed complications were discussed with the   patient.  Return to normal activities tomorrow.  Written discharge   instructions were provided to the patient.   - High fiber diet.   - Continue present medications.   - Await pathology results.   - No aspirin, ibuprofen, naproxen, or other non-steroidal anti-inflammatory   drugs for 2 weeks after polyp removal.   - Repeat colonoscopy in 3 months for surveillance after piecemeal   polypectomy.   - Discharge patient to home (ambulatory).   - Return to my office PRN.  For questions, problems or results please call your physician - Joni Murrell MD at Work:  [phone].  [facility], EMERGENCY ROOM PHONE NUMBER: [phone]  IF A COMPLICATION OR EMERGENCY SITUATION ARISES AND YOU ARE UNABLE TO REACH   YOUR PHYSICIAN - GO DIRECTLY TO THE EMERGENCY ROOM.  Joni Murrell MD  6/2/2022 11:38:29 AM  This report has been verified and signed electronically.  Dear patient,  As a result of recent federal legislation (The Federal Cures Act), you may   receive lab or pathology results from your procedure in your [facility]   account before your physician is able to contact you. Your physician or   their representative will relay the results to you with their   recommendations at their  soonest availability.  Thank you,  PROVATION

## 2022-06-03 VITALS
SYSTOLIC BLOOD PRESSURE: 91 MMHG | HEART RATE: 65 BPM | OXYGEN SATURATION: 100 % | RESPIRATION RATE: 14 BRPM | DIASTOLIC BLOOD PRESSURE: 52 MMHG | TEMPERATURE: 98 F

## 2022-06-04 ENCOUNTER — PATIENT MESSAGE (OUTPATIENT)
Dept: GASTROENTEROLOGY | Facility: CLINIC | Age: 67
End: 2022-06-04
Payer: OTHER GOVERNMENT

## 2022-06-06 ENCOUNTER — PATIENT MESSAGE (OUTPATIENT)
Dept: GASTROENTEROLOGY | Facility: CLINIC | Age: 67
End: 2022-06-06
Payer: OTHER GOVERNMENT

## 2022-06-06 LAB
FINAL PATHOLOGIC DIAGNOSIS: NORMAL
GROSS: NORMAL
Lab: NORMAL

## 2022-06-08 DIAGNOSIS — M25.511 RIGHT SHOULDER PAIN, UNSPECIFIED CHRONICITY: Primary | ICD-10-CM

## 2022-06-15 ENCOUNTER — CLINICAL SUPPORT (OUTPATIENT)
Dept: REHABILITATION | Facility: HOSPITAL | Age: 67
End: 2022-06-15
Payer: OTHER GOVERNMENT

## 2022-06-15 ENCOUNTER — HOSPITAL ENCOUNTER (OUTPATIENT)
Dept: RADIOLOGY | Facility: HOSPITAL | Age: 67
Discharge: HOME | End: 2022-06-15
Payer: OTHER GOVERNMENT

## 2022-06-15 DIAGNOSIS — R13.10 DYSPHAGIA, UNSPECIFIED TYPE: ICD-10-CM

## 2022-06-15 PROCEDURE — 92611 MOTION FLUOROSCOPY/SWALLOW: CPT | Mod: PN

## 2022-06-15 PROCEDURE — 74230 X-RAY XM SWLNG FUNCJ C+: CPT | Mod: TC

## 2022-06-15 PROCEDURE — 74230 X-RAY XM SWLNG FUNCJ C+: CPT | Mod: 26,,, | Performed by: RADIOLOGY

## 2022-06-15 PROCEDURE — 92610 EVALUATE SWALLOWING FUNCTION: CPT | Mod: PN

## 2022-06-15 PROCEDURE — 74230 FL MODIFIED BARIUM SWALLOW SPEECH STUDY: ICD-10-PCS | Mod: 26,,, | Performed by: RADIOLOGY

## 2022-06-16 ENCOUNTER — TELEPHONE (OUTPATIENT)
Dept: PSYCHIATRY | Facility: CLINIC | Age: 67
End: 2022-06-16
Payer: OTHER GOVERNMENT

## 2022-06-16 ENCOUNTER — OFFICE VISIT (OUTPATIENT)
Dept: ORTHOPEDICS | Facility: CLINIC | Age: 67
End: 2022-06-16
Payer: OTHER GOVERNMENT

## 2022-06-16 ENCOUNTER — HOSPITAL ENCOUNTER (OUTPATIENT)
Dept: RADIOLOGY | Facility: HOSPITAL | Age: 67
Discharge: HOME | End: 2022-06-16
Attending: ORTHOPAEDIC SURGERY
Payer: OTHER GOVERNMENT

## 2022-06-16 VITALS — HEIGHT: 60 IN | BODY MASS INDEX: 19.83 KG/M2 | WEIGHT: 101 LBS | RESPIRATION RATE: 18 BRPM

## 2022-06-16 DIAGNOSIS — M25.511 RIGHT SHOULDER PAIN, UNSPECIFIED CHRONICITY: ICD-10-CM

## 2022-06-16 DIAGNOSIS — M75.101 TEAR OF RIGHT ROTATOR CUFF, UNSPECIFIED TEAR EXTENT, UNSPECIFIED WHETHER TRAUMATIC: Primary | ICD-10-CM

## 2022-06-16 PROCEDURE — 73030 X-RAY EXAM OF SHOULDER: CPT | Mod: TC,PN,RT

## 2022-06-16 PROCEDURE — 99999 PR PBB SHADOW E&M-EST. PATIENT-LVL IV: ICD-10-PCS | Mod: PBBFAC,,, | Performed by: ORTHOPAEDIC SURGERY

## 2022-06-16 PROCEDURE — 99203 OFFICE O/P NEW LOW 30 MIN: CPT | Mod: S$PBB,,, | Performed by: ORTHOPAEDIC SURGERY

## 2022-06-16 PROCEDURE — 99214 OFFICE O/P EST MOD 30 MIN: CPT | Mod: PBBFAC,PN | Performed by: ORTHOPAEDIC SURGERY

## 2022-06-16 PROCEDURE — 99203 PR OFFICE/OUTPT VISIT, NEW, LEVL III, 30-44 MIN: ICD-10-PCS | Mod: S$PBB,,, | Performed by: ORTHOPAEDIC SURGERY

## 2022-06-16 PROCEDURE — 99999 PR PBB SHADOW E&M-EST. PATIENT-LVL IV: CPT | Mod: PBBFAC,,, | Performed by: ORTHOPAEDIC SURGERY

## 2022-06-16 PROCEDURE — 73030 X-RAY EXAM OF SHOULDER: CPT | Mod: 26,RT,, | Performed by: RADIOLOGY

## 2022-06-16 PROCEDURE — 73030 XR SHOULDER TRAUMA 3 VIEW RIGHT: ICD-10-PCS | Mod: 26,RT,, | Performed by: RADIOLOGY

## 2022-06-16 NOTE — PROGRESS NOTES
See Modified Barium Swallow Study results in Plan of Care.    TARA Chua, CCC-SLP, CBIS  Speech-Language Pathology  6/15/2022

## 2022-06-16 NOTE — TELEPHONE ENCOUNTER
Appointment canceled   [facility], System Message   Sent:  12:13 PM   To: [facility] Clinical Support Staff    Saira Osuna   MRN: [id number] : 1955   Pt Home: [phone]     Entered: [phone]          Message    Appointment canceled for Saira Osuna ([id number])   Visit Type: NEW PATIENT - PSYCHIATRY ([facility])   Date        Time      Length    Provider                  Department   2022    2:00 PM  60 mins.   KAROL Gallagher [facility]      Reason for Cancellation: Error/Scheduled Incorrectly

## 2022-06-16 NOTE — PROGRESS NOTES
Past Medical History:   Diagnosis Date    Constipation     Dependence on nocturnal oxygen therapy     2 liters/ nasal cannula    Depression     Emphysema lung     Encounter for blood transfusion     GERD (gastroesophageal reflux disease)     Migraines     Neck pain        Past Surgical History:   Procedure Laterality Date    AUGMENTATION OF BREAST      CERVICAL FUSION      COLONOSCOPY      COLONOSCOPY N/A 6/2/2022    Procedure: COLONOSCOPY;  Surgeon: Joni Burnett MD;  Location: [facility] ENDO;  Service: Endoscopy;  Laterality: N/A;    ENDOSCOPIC RELEASE OF BOTH CARPAL TUNNELS      2 carpal surgeries on right, one on left    ESOPHAGEAL DILATION      ESOPHAGOGASTRODUODENOSCOPY N/A 02/08/2022    Procedure: EGD (ESOPHAGOGASTRODUODENOSCOPY);  Surgeon: Joni Burnett MD;  Location: [facility] ENDO;  Service: Endoscopy;  Laterality: N/A;    INTERPOSITION ARTHROPLASTY OF CARPOMETACARPAL JOINTS Right     thumb    TONSILLECTOMY      TOTAL HIP ARTHROPLASTY Bilateral     UPPER GASTROINTESTINAL ENDOSCOPY         Current Outpatient Medications   Medication Sig    albuterol (PROVENTIL/VENTOLIN HFA) 90 mcg/actuation inhaler INHALE 2 PUFFS BY MOUTH FOUR TIMES A DAY AS NEEDED FOR BREATHING    aspirin (ECOTRIN) 81 MG EC tablet Take 81 mg by mouth once daily.    bisacodyL (DULCOLAX) 5 mg EC tablet Dulcolax (bisacodyl) 5 mg tablet,delayed release   take 2 (5mg) tablets as needed if no bowel movement for 2-3 days    buPROPion (WELLBUTRIN XL) 150 MG TB24 tablet TAKE THREE TABLETS BY MOUTH EVERY DAY FOR DEPRESSION    calcium carbonate-vitamin D3 250-125 mg 250 mg-3.125 mcg (125 unit) Tab TAKE 2 TABLETS BY MOUTH EVERY DAY AS A MINERAL SUPPLEMENT    celecoxib (CELEBREX) 200 MG capsule celecoxib 200 mg capsule   Take 1 capsule twice a day by oral route.    cyanocobalamin (VITAMIN B-12) 1000 MCG tablet TAKE ONE TABLET BY MOUTH EVERY DAY FOR VITAMIN DEFICIENCIES    ferrous sulfate (FEOSOL) 325 mg (65 mg iron) Tab tablet  Iron (ferrous sulfate) 325 mg (65 mg iron) tablet   Take 1 tablet every other day by oral route.    gabapentin (NEURONTIN) 300 MG capsule gabapentin 300 mg capsule   Take 1 capsule 3 times a day by oral route as needed.    hydrOXYzine (ATARAX) 50 MG tablet TAKE ONE TABLET BY MOUTH THREE TIMES A DAY AS NEEDED FOR ALLERGIES/ITCHING ANXIETY    methocarbamoL (ROBAXIN) 500 MG Tab TAKE ONE TABLET BY MOUTH THREE TIMES A DAY IF NEEDED (MUSCLE RELAXANT) (START WITH 1/2 TABLET AT BEDTIME THEN ADD IN MORNING DOSE AND IF  TOLERATED THEN TAKE 1/2 TABLET THREE TIMES A DAY FOR A FEW DAYS AND THEN  CAN INCREASE TO A FULL TABLET IF NO SIDE EFFECTS) (MUSCLE RELAXANT)  (START WITH 1/2 TABLET AT BEDTIME THEN ADD IN MORNING DOSE AND IF   TOLERATED THEN TAKE 1/2 TABLET THREE TIMES A DAY FOR A FEW DAYS AND THEN   CAN INCREASE TO A FULL TABLET IF NO SIDE EFFECTS)    omeprazole (PRILOSEC) 40 MG capsule omeprazole 40 mg capsule,delayed release   Take 1 capsule every day by oral route.    polyethylene glycol (GLYCOLAX) 17 gram/dose powder Miralax 17 gram/dose oral powder   1 capful daily at bedtime    sumatriptan (IMITREX) 100 MG tablet Take 100 mg by mouth every 2 (two) hours as needed for Migraine.    trazodone HCl (TRAZODONE ORAL) trazodone   100 mg  at night     No current facility-administered medications for this visit.       Review of patient's allergies indicates:   Allergen Reactions    Penicillins Hives       Family History   Problem Relation Age of Onset    Breast cancer Maternal Aunt     Colon cancer Neg Hx     Colon polyps Neg Hx     Crohn's disease Neg Hx     Ulcerative colitis Neg Hx     Stomach cancer Neg Hx     Rectal cancer Neg Hx        Social History     Socioeconomic History    Marital status: Single   Tobacco Use    Smoking status: Former Smoker     Quit date: 1990     Years since quittin.4    Smokeless tobacco: Never Used   Substance and Sexual Activity    Alcohol use: Yes     Comment:  rarely    Drug use: Never       Chief Complaint:   Chief Complaint   Patient presents with    Right Shoulder - Pain       History of present illness:  This is a 67-year-old right-hand-dominant female seen for right shoulder pain.  [facility]  Patient has a history of shoulder problems.  Had a previous rotator cuff repair in 2003 in the shoulder.  Patient had significant spinal fusion of her cervical spine from C1-T1.  Pain is a 1/10.  Pain in the shoulder started about a year ago.  Pain in the lateral shoulder.  Hurts to lift her arm up.  It is not constant.  Previous treatments included some physical therapy without relief.    Answers for HPI/ROS submitted by the patient on 6/13/2022  unexpected weight change: Yes  appetite change : No  sleep disturbance: Yes  IMMUNOCOMPROMISED: No  nervous/ anxious: Yes  dysphoric mood: Yes  rash: No  visual disturbance: Yes  eye redness: No  eye pain: No  ear pain: No  tinnitus: Yes  hearing loss: No  sinus pressure : No  nosebleeds: No  enviro allergies: Yes  food allergies: No  cough: No  shortness of breath: No  sweating: No  dysuria: No  frequency: No  difficulty urinating: No  hematuria: No  painful intercourse: No  chest pain: No  palpitations: No  nausea: No  vomiting: No  diarrhea: Yes  blood in stool: Yes  constipation: Yes  headaches: Yes  dizziness: No  numbness: No  seizures: No  joint swelling: Yes  myalgia: Yes  weakness: Yes  back pain: Yes  Pain Chronicity: chronic  History of trauma: Yes  Onset: more than 1 year ago  Frequency: constantly  Progression since onset: rapidly worsening  Injury mechanism: lifting  injury location: at home  pain- numeric: 2/10  pain location: right shoulder, right wrist  pain quality: aching, sharp  Radiating Pain: Yes  If your pain is radiating, to what part of the body?: right arm  Aggravating factors: bearing weight, flexion, twisting, lying down  fever: No  inability to bear weight: Yes  itching:  No  joint locking: Yes  limited range of motion: No  stiffness: Yes  tingling: No  Treatments tried: heat  physical therapy: ineffective  Improvement on treatment: no relief        Physical Examination:    Vital Signs:    Vitals:    06/16/22 0950   Resp: 18       Body mass index is 19.73 kg/m².    This a well-developed, well nourished patient in no acute distress.  They are alert and oriented and cooperative to examination.  Pt. walks without an antalgic gait.      Examination of the right shoulder shows no rashes or erythema.  Healed prior surgical scars.  There are no masses, ecchymosis, or atrophy. The patient has full range of motion in forward flexion, external rotation, and internal rotation to the mid T-spine. The patient has positive Mendoza test.  Positive Neer - Aiken's test. - Speeds test. Nontender to palpation over a.c. joint. Normal stability anteriorly, posteriorly, and negative sulcus sign. Passive range of motion: Forward flexion of 180°, external rotation at 90° of 90°, internal rotation of 50°, and external rotation at 0° of 50°. 2+ radial pulse. Intact axillary, radial, median and ulnar sensation. 5 out of 5 resisted forward flexion, external rotation, and negative lift off test.      X-rays:  X-rays of the right shoulder ordered and reviewed which show some mild arthritic changes with a small humeral spur.  Significant hardware noted in the cervical spine.     Assessment::  Right rotator cuff pathology  Right mild glenohumeral arthritis    Plan:  I reviewed the findings with her today.  I recommended an MRI to further evaluate her right shoulder.  Patient has a history of prior rotator cuff tear with repair.  She has had symptoms now for about a year without resolution despite formal physical therapy.    This note was created using Pharnext voice recognition software that occasionally misinterpreted phrases or words.    Consult note is delivered via Epic messaging service.

## 2022-06-16 NOTE — PLAN OF CARE
[facility]  MODIFIED BARIUM SWALLOW STUDY      Date: 6/15/2022     Name: Saira Osuna   MRN: [id number]    Therapy Diagnosis: WFL oral and pharyngeal phases of the swallow    Physician: Aileen Alexander NP  Physician Orders: SLP Video Swallow  Medical Diagnosis from Referral: Dysphagia, unspecified type [R13.10]      Date of Evaluation:  6/15/2022    Time In:  1100  Time Out:  1130  Total Billable Time: 30     Procedure Min.   Swallow and Oral Function Evaluation   15   Fl Modified Barium Swallow Speech  15     Precautions: Standard    Subjective   Date of Onset: Approximately 5 years ago    Past Medical History: Saira Osuna  has a past medical history of Constipation, Dependence on nocturnal oxygen therapy, Depression, Emphysema lung, Encounter for blood transfusion, GERD (gastroesophageal reflux disease), Migraines, and Neck pain.  Saira Osuna  has a past surgical history that includes Augmentation of breast; Colonoscopy; Upper gastrointestinal endoscopy; Esophageal dilation; Cervical fusion; Endoscopic release of both carpal tunnels; Total hip arthroplasty (Bilateral); Tonsillectomy; Interposition arthroplasty of carpometacarpal joints (Right); Esophagogastroduodenoscopy (N/A, 02/08/2022); and Colonoscopy (N/A, 6/2/2022).    The patient is a 67 y.o. female who complains of difficulty swallowing solids and food getting stuck during the swallow.     The patient gave the following history:   -Current diet at home: full oral - regular/thin liquids  -Recommended diet from previous study: n/a  -Therapy received: n/  -Neurological: Pt denied any neurological diagnoses.  -Gastroenterologist (GI) : Pt endorsed GI diagnosis of GERD, serial esophageal dilations (3 copleted over 2 years - last one completed February 2022), Dixon's esophagus, and Schatzki's ring. Pt denied all other GI diagnoses.   -Pulmonary: Pt endorsed pulmonary diagnosis of Emphysema. Pt denied all other pulmonary  diagnoses.  -Surgery:   Past Surgical History:   Procedure Laterality Date    AUGMENTATION OF BREAST      CERVICAL FUSION      COLONOSCOPY      COLONOSCOPY N/A 6/2/2022    Procedure: COLONOSCOPY;  Surgeon: Joni Burnett MD;  Location: [facility] ENDO;  Service: Endoscopy;  Laterality: N/A;    ENDOSCOPIC RELEASE OF BOTH CARPAL TUNNELS      2 carpal surgeries on right, one on left    ESOPHAGEAL DILATION      ESOPHAGOGASTRODUODENOSCOPY N/A 02/08/2022    Procedure: EGD (ESOPHAGOGASTRODUODENOSCOPY);  Surgeon: Joni Burnett MD;  Location: [facility];  Service: Endoscopy;  Laterality: N/A;    INTERPOSITION ARTHROPLASTY OF CARPOMETACARPAL JOINTS Right     thumb    TONSILLECTOMY      TOTAL HIP ARTHROPLASTY Bilateral     UPPER GASTROINTESTINAL ENDOSCOPY         -Cancer: n/a    The following observations were made:   -Mental status: Alert and Cooperative  -Factors affecting performance: no difficulties participating in the study  -Feeding Method: independent in self-feeding    Respiratory Status:   -Respiratory Status: room air    Medical Hx and Allergies:    Review of patient's allergies indicates:   Allergen Reactions    Penicillins Hives       Pain Scale:  0/10 on VAS currently.   Pain Location: no pain indicated across study    Objective     Modified Barium Swallow Study  A modified barium swallow study was ordered to objectively evaluate the safety and efficiency of the patients swallowing and to rule out aspiration.      The patient was seen in radiology seated in High [place]'s position in a video imaging chair for lateral views of the larynx and an A/P view. The study was conducted using Varibar thin liquid (IDDSI 0), Varibar nectar liquid (IDDSI 2), Varibar pudding (IDDSI 4), Peaches covered in Varibar powder (IDDSI 6/2) and solid coated in Varibar pudding (IDDSI 7). She tolerated the procedure well.     A cranial nerve examination revealed the following:  Cranial Nerve Examination  Cranial Nerve 5:  Trigeminal Nerve  Motor Jaw Posture at rest: Closed  Mandible Elevation/Depression: WFL  Mandible lateralization: WFL  Abnormal movement: absent Interpretation: Within Functional Limits    Sensory Forehead: WFL  Cheek: WFL  Jaw: WFL  Facial Pain: None noted Interpretation: Within Functional Limits      Cranial Nerve 7: Facial Nerve  Motor Facial Symmetry: WNL  Wrinkle Forehead: WFL  Close eyes tightly: WFL  Labial Protrusion: WFL  Labial Retraction: WFL  Abnormal movement: absent Interpretation: Within Functional Limits    Sensory Formal testing not completed. Patient denied any changes in taste      Cranial Nerves IX and X: Glossopharyngeal and Vagus Nerves  Motor Palatal Symmetry (Rest): WNL  Palatal Symmetry (Movement): WNL  Cough: Perceptually strong  Voice Prior to PO intake: Clear  Resonance: Normal  Abnormal movement: absent Interpretation: Within Functional Limits      Cranial Nerve XII: Hypoglossal Nerve  Motor Tongue at rest: WNL  Lingual Protrusion: WNL  Lingual Protrusion against Resistance: WNL  Lingual Lateralization: WNL  Abnormal movement: absent Interpretation: Within Functional Limits      Other information:   Volitional Swallow: Able to palpate laryngeal rise   Mucosal Quality: No abnormal findings   Secretion Management: n/a   Dentition: Upper dentures and Teeth in poor condition lower      CONSISTENCIES ADMINISTERED:  Thin Liquids (IDDSI 0):   Mode and volume administered: 5ml x2, 10 ml x2, self-regulated cup sip x2, self-regulated straw sip x2, rapid consecutive cup sip x1, rapid consecutive straw sip x1   Oral Residue: none to mild    Vallecular Residue: none to trace    Pyriform Sinus Residue: none    Rosenbeck's 8-Point Penetration-Aspiration Scale: (1) Material does not enter the airway and (2) Material enters the airway, remains above the vocal folds, and is ejected from the airway  o Best: (1) Material does not enter the airway  o Worst: (2) Material enters the airway, remains  above the vocal folds, and is ejected from the airway  - FLASH penetration occurred during the swallow with thin liquids via 5mL x2      Nectar Thick Liquids (IDDSI 2):   Mode and volume administered: 5ml x2, 10 ml x2   Oral Residue: trace    Vallecular Residue: none to trace   Pyriform Sinus Residue: none to trace   Rosenbeck's 8-Point Penetration-Aspiration Scale: (1) Material does not enter the airway     Puree (IDDSI 4):   Mode and volume administered: 5ml x2, 10 ml x1   Oral Residue: none to trace   Vallecular Residue: none    Pyriform Sinus Residue: none    Rosenbeck's 8-Point Penetration-Aspiration Scale: (1) Material does not enter the airway   Strategies attempted: WARM water rinse for esophageal clearance     Mixed Consistency Bolus (IDDSI 6 with IDDSI 2):   Mode and volume administered: 2 peaches in juice x2   Oral Residue: none    Vallecular Residue: none   Pyriform Sinus Residue: none    Rosenbeck's 8-Point Penetration-Aspiration Scale: (1) Material does not enter the airway    Regular (IDDSI 7):   Mode and volume administered: 1/3 Yessi doone cookie in barium pudding x1   Oral Residue: mild    Vallecular Residue: none    Pyriform Sinus Residue: none    Rosenbeck's 8-Point Penetration-Aspiration Scale: (1) Material does not enter the airway      Treatment   Treatment Time In: n/a  Treatment Time Out: n/a  Total Treatment Time: n/a  Patient educated regarding results and recommendations of the evaluation. See the recommendations section below.    Education: SLP role in care, Plan of care, Results of the study, Aspiration precautions and Recommendations were discussed with the patient. Patient expressed understanding.     Assessment     Saira Osuna is a 67 y.o. female referred for Modified Barium Swallow Study with a medical diagnosis of unspecified type of dysphagia.     Oral Phase: Lip closure was complete with no labial escape.  Bolus preparation and mastication was timely and  efficient. Lingual motion was brisk for adequate bolus transport. There was trace to mild oral residue on all consistencies which was eliminated with dry swallow.. The swallow was initiated when the head of the bolus entered the pyriform sinuses.    Pharyngeal Phase:  The soft palate elevated for complete closure of the velopharyngeal port. Tongue base retraction was adequate. A cervical protrusion (posterior fusion) at C1-T2 was noted on this study. Epiglottic inversion appeared to be complete. Anterior hyoid excursion was adequate. Laryngeal elevation was adequate. There was transient penetration noted on 5mL thin x2.  There was no aspiration observed in this study.  Pharyngeal stripping wave appeared present and complete. The pharyngoesophageal segment opening was adequate.  There was trace pharyngeal residue in the valleculae on thin and nectar which was eliminated with dry swallow.    Esophageal Phase: On esophageal screen, delayed esophageal emptying and retrograde flow below the pharyngo-esophageal segment  were noted.    Dysphagia Outcome and Severity Scale (TONIO): Level 6: WFL/ Modified Greene    Impressions: WFL oral and pharyngeal phases of the swallow. Both swallow safety and swallow efficiency are preserved,. Patient appears to be at low risk for aspiration related pneumonia in consideration of three pillars of aspiration pneumonia* including her oral health status, overall health/immune status, and laryngeal vestibule closure/severity of dysphagia.    *JOSE Cline (2005, March). Pneumonia: Factors Beyond Aspiration. Perspectives in Swallowing and Swallowing Disorders (Dysphagia), 14, 10-16.    Recommendations:      Consistency Recommendations: THIN liquids (IDDSI 0) and REGULAR consistencies (IDDSI 7).    Risk Management: use good oral hygiene , sit upright for all PO intake, increase physical mobility as tolerated, alternate bites and sips, small bites and sips, remain upright for at least  1-2 hours following any PO intake and eat small meals throughout the day to reduce discomfort associated with delayed emptying of the esophagus, WARM water rinse to improve esophageal clearance.    Specialist Referrals: continue follow-up with GI   Therapy: Dysphagia therapy is not recommended at this time.   Follow-up exam: Follow up swallow study is not indicated at this time.    Please contact [facility] at [phone] if there are questions re: the above or if we can be of additional service to this patient.    Therapist's Name:   Mary Jacques CCC-SLP   Speech Language Pathologist  Certified Brain Injury Specialist    Date: 6/15/2022

## 2022-06-30 ENCOUNTER — HOSPITAL ENCOUNTER (OUTPATIENT)
Dept: RADIOLOGY | Facility: HOSPITAL | Age: 67
Discharge: HOME | End: 2022-06-30
Attending: ORTHOPAEDIC SURGERY
Payer: OTHER GOVERNMENT

## 2022-06-30 DIAGNOSIS — M75.101 TEAR OF RIGHT ROTATOR CUFF, UNSPECIFIED TEAR EXTENT, UNSPECIFIED WHETHER TRAUMATIC: ICD-10-CM

## 2022-06-30 PROCEDURE — 73221 MRI JOINT UPR EXTREM W/O DYE: CPT | Mod: TC,PO,RT

## 2022-07-14 ENCOUNTER — PATIENT MESSAGE (OUTPATIENT)
Dept: GASTROENTEROLOGY | Facility: CLINIC | Age: 67
End: 2022-07-14
Payer: OTHER GOVERNMENT

## 2022-07-18 ENCOUNTER — OFFICE VISIT (OUTPATIENT)
Dept: ORTHOPEDICS | Facility: CLINIC | Age: 67
End: 2022-07-18
Payer: OTHER GOVERNMENT

## 2022-07-18 VITALS — BODY MASS INDEX: 19.83 KG/M2 | RESPIRATION RATE: 18 BRPM | HEIGHT: 60 IN | WEIGHT: 101 LBS

## 2022-07-18 DIAGNOSIS — M75.101 TEAR OF RIGHT ROTATOR CUFF, UNSPECIFIED TEAR EXTENT, UNSPECIFIED WHETHER TRAUMATIC: Primary | ICD-10-CM

## 2022-07-18 DIAGNOSIS — M19.011 GLENOHUMERAL ARTHRITIS, RIGHT: ICD-10-CM

## 2022-07-18 PROCEDURE — 99999 PR PBB SHADOW E&M-EST. PATIENT-LVL III: CPT | Mod: PBBFAC,,, | Performed by: ORTHOPAEDIC SURGERY

## 2022-07-18 PROCEDURE — 20610 DRAIN/INJ JOINT/BURSA W/O US: CPT | Mod: PBBFAC,PN | Performed by: ORTHOPAEDIC SURGERY

## 2022-07-18 PROCEDURE — 20610 LARGE JOINT ASPIRATION/INJECTION: R SUBACROMIAL BURSA: ICD-10-PCS | Mod: S$PBB,RT,, | Performed by: ORTHOPAEDIC SURGERY

## 2022-07-18 PROCEDURE — 99213 PR OFFICE/OUTPT VISIT, EST, LEVL III, 20-29 MIN: ICD-10-PCS | Mod: 25,S$PBB,, | Performed by: ORTHOPAEDIC SURGERY

## 2022-07-18 PROCEDURE — 99999 PR PBB SHADOW E&M-EST. PATIENT-LVL III: ICD-10-PCS | Mod: PBBFAC,,, | Performed by: ORTHOPAEDIC SURGERY

## 2022-07-18 PROCEDURE — 99213 OFFICE O/P EST LOW 20 MIN: CPT | Mod: 25,S$PBB,, | Performed by: ORTHOPAEDIC SURGERY

## 2022-07-18 PROCEDURE — 99213 OFFICE O/P EST LOW 20 MIN: CPT | Mod: PBBFAC,PN,25 | Performed by: ORTHOPAEDIC SURGERY

## 2022-07-18 RX ORDER — TRIAMCINOLONE ACETONIDE 40 MG/ML
40 INJECTION, SUSPENSION INTRA-ARTICULAR; INTRAMUSCULAR
Status: DISCONTINUED | OUTPATIENT
Start: 2022-07-18 | End: 2022-07-18 | Stop reason: HOSPADM

## 2022-07-18 RX ADMIN — TRIAMCINOLONE ACETONIDE 40 MG: 40 INJECTION, SUSPENSION INTRA-ARTICULAR; INTRAMUSCULAR at 09:07

## 2022-07-18 NOTE — PROCEDURES
Large Joint Aspiration/Injection: R subacromial bursa    Date/Time: 7/18/2022 9:00 AM  Performed by: Jhoan Valerio MD  Authorized by: Jhoan Valerio MD     Consent Done?:  Yes (Verbal)  Indications:  Pain  Site marked: the procedure site was marked    Timeout: prior to procedure the correct patient, procedure, and site was verified    Local anesthetic:  Lidocaine 1% without epinephrine and bupivacaine 0.25% without epinephrine  Anesthetic total (ml):  6      Details:  Needle Size:  20 G  Ultrasonic Guidance for needle placement?: No    Approach:  Posterior  Location:  Shoulder  Site:  R subacromial bursa  Medications:  40 mg triamcinolone acetonide 40 mg/mL  Patient tolerance:  Patient tolerated the procedure well with no immediate complications

## 2022-07-18 NOTE — PROGRESS NOTES
Past Medical History:   Diagnosis Date    Constipation     Dependence on nocturnal oxygen therapy     2 liters/ nasal cannula    Depression     Emphysema lung     Encounter for blood transfusion     GERD (gastroesophageal reflux disease)     Migraines     Neck pain        Past Surgical History:   Procedure Laterality Date    AUGMENTATION OF BREAST      CERVICAL FUSION      COLONOSCOPY      COLONOSCOPY N/A 6/2/2022    Procedure: COLONOSCOPY;  Surgeon: Joni Burnett MD;  Location: [facility] ENDO;  Service: Endoscopy;  Laterality: N/A;    ENDOSCOPIC RELEASE OF BOTH CARPAL TUNNELS      2 carpal surgeries on right, one on left    ESOPHAGEAL DILATION      ESOPHAGOGASTRODUODENOSCOPY N/A 02/08/2022    Procedure: EGD (ESOPHAGOGASTRODUODENOSCOPY);  Surgeon: Joni Burnett MD;  Location: [facility] ENDO;  Service: Endoscopy;  Laterality: N/A;    INTERPOSITION ARTHROPLASTY OF CARPOMETACARPAL JOINTS Right     thumb    TONSILLECTOMY      TOTAL HIP ARTHROPLASTY Bilateral     UPPER GASTROINTESTINAL ENDOSCOPY         Current Outpatient Medications   Medication Sig    albuterol (PROVENTIL/VENTOLIN HFA) 90 mcg/actuation inhaler INHALE 2 PUFFS BY MOUTH FOUR TIMES A DAY AS NEEDED FOR BREATHING    aspirin (ECOTRIN) 81 MG EC tablet Take 81 mg by mouth once daily.    bisacodyL (DULCOLAX) 5 mg EC tablet Dulcolax (bisacodyl) 5 mg tablet,delayed release   take 2 (5mg) tablets as needed if no bowel movement for 2-3 days    buPROPion (WELLBUTRIN XL) 150 MG TB24 tablet TAKE THREE TABLETS BY MOUTH EVERY DAY FOR DEPRESSION    calcium carbonate-vitamin D3 250-125 mg 250 mg-3.125 mcg (125 unit) Tab TAKE 2 TABLETS BY MOUTH EVERY DAY AS A MINERAL SUPPLEMENT    celecoxib (CELEBREX) 200 MG capsule celecoxib 200 mg capsule   Take 1 capsule twice a day by oral route.    cyanocobalamin (VITAMIN B-12) 1000 MCG tablet TAKE ONE TABLET BY MOUTH EVERY DAY FOR VITAMIN DEFICIENCIES    ferrous sulfate (FEOSOL) 325 mg (65 mg iron) Tab tablet  Iron (ferrous sulfate) 325 mg (65 mg iron) tablet   Take 1 tablet every other day by oral route.    gabapentin (NEURONTIN) 300 MG capsule gabapentin 300 mg capsule   Take 1 capsule 3 times a day by oral route as needed.    hydrOXYzine (ATARAX) 50 MG tablet TAKE ONE TABLET BY MOUTH THREE TIMES A DAY AS NEEDED FOR ALLERGIES/ITCHING ANXIETY    methocarbamoL (ROBAXIN) 500 MG Tab TAKE ONE TABLET BY MOUTH THREE TIMES A DAY IF NEEDED (MUSCLE RELAXANT) (START WITH 1/2 TABLET AT BEDTIME THEN ADD IN MORNING DOSE AND IF  TOLERATED THEN TAKE 1/2 TABLET THREE TIMES A DAY FOR A FEW DAYS AND THEN  CAN INCREASE TO A FULL TABLET IF NO SIDE EFFECTS) (MUSCLE RELAXANT)  (START WITH 1/2 TABLET AT BEDTIME THEN ADD IN MORNING DOSE AND IF   TOLERATED THEN TAKE 1/2 TABLET THREE TIMES A DAY FOR A FEW DAYS AND THEN   CAN INCREASE TO A FULL TABLET IF NO SIDE EFFECTS)    omeprazole (PRILOSEC) 40 MG capsule omeprazole 40 mg capsule,delayed release   Take 1 capsule every day by oral route.    polyethylene glycol (GLYCOLAX) 17 gram/dose powder Miralax 17 gram/dose oral powder   1 capful daily at bedtime    sumatriptan (IMITREX) 100 MG tablet Take 100 mg by mouth every 2 (two) hours as needed for Migraine.    trazodone HCl (TRAZODONE ORAL) trazodone   100 mg  at night     No current facility-administered medications for this visit.       Review of patient's allergies indicates:   Allergen Reactions    Penicillins Hives       Family History   Problem Relation Age of Onset    Breast cancer Maternal Aunt     Colon cancer Neg Hx     Colon polyps Neg Hx     Crohn's disease Neg Hx     Ulcerative colitis Neg Hx     Stomach cancer Neg Hx     Rectal cancer Neg Hx        Social History     Socioeconomic History    Marital status: Single   Tobacco Use    Smoking status: Former Smoker     Quit date: 1990     Years since quittin.5    Smokeless tobacco: Never Used   Substance and Sexual Activity    Alcohol use: Yes     Comment:  rarely    Drug use: Never       Chief Complaint:   Chief Complaint   Patient presents with    Right Shoulder - Pain       History of present illness:  This is a 67-year-old right-hand-dominant female seen for right shoulder pain.  [facility]  Patient has a history of shoulder problems.  Had a previous rotator cuff repair in 2003 in the shoulder.  Patient had significant spinal fusion of her cervical spine from C1-T1.  Pain is a 1/10.  Pain in the shoulder started about a year ago.  Pain in the lateral shoulder.  Hurts to lift her arm up.  It is not constant.  Previous treatments included some physical therapy without relief.  MRI does show some arthritis as well as some partial tearing.    Answers for HPI/ROS submitted by the patient on 6/13/2022  unexpected weight change: Yes  appetite change : No  sleep disturbance: Yes  IMMUNOCOMPROMISED: No  nervous/ anxious: Yes  dysphoric mood: Yes  rash: No  visual disturbance: Yes  eye redness: No  eye pain: No  ear pain: No  tinnitus: Yes  hearing loss: No  sinus pressure : No  nosebleeds: No  enviro allergies: Yes  food allergies: No  cough: No  shortness of breath: No  sweating: No  dysuria: No  frequency: No  difficulty urinating: No  hematuria: No  painful intercourse: No  chest pain: No  palpitations: No  nausea: No  vomiting: No  diarrhea: Yes  blood in stool: Yes  constipation: Yes  headaches: Yes  dizziness: No  numbness: No  seizures: No  joint swelling: Yes  myalgia: Yes  weakness: Yes  back pain: Yes  Pain Chronicity: chronic  History of trauma: Yes  Onset: more than 1 year ago  Frequency: constantly  Progression since onset: rapidly worsening  Injury mechanism: lifting  injury location: at home  pain- numeric: 2/10  pain location: right shoulder, right wrist  pain quality: aching, sharp  Radiating Pain: Yes  If your pain is radiating, to what part of the body?: right arm  Aggravating factors: bearing weight, flexion, twisting,  lying down  fever: No  inability to bear weight: Yes  itching: No  joint locking: Yes  limited range of motion: No  stiffness: Yes  tingling: No  Treatments tried: heat  physical therapy: ineffective  Improvement on treatment: no relief        Physical Examination:    Vital Signs:    Vitals:    07/18/22 0858   Resp: 18       Body mass index is 19.73 kg/m².    This a well-developed, well nourished patient in no acute distress.  They are alert and oriented and cooperative to examination.  Pt. walks without an antalgic gait.      Examination of the right shoulder shows no rashes or erythema.  Healed prior surgical scars.  There are no masses, ecchymosis, or atrophy. The patient has full range of motion in forward flexion, external rotation, and internal rotation to the mid T-spine. The patient has positive Mendoza test.  Positive Neer - [place]'s test. - Speeds test. Nontender to palpation over a.c. joint. Normal stability anteriorly, posteriorly, and negative sulcus sign. Passive range of motion: Forward flexion of 180°, external rotation at 90° of 90°, internal rotation of 50°, and external rotation at 0° of 50°. 2+ radial pulse. Intact axillary, radial, median and ulnar sensation. 5 out of 5 resisted forward flexion, external rotation, and negative lift off test.      X-rays:  X-rays of the right shoulder reviewed which show some mild arthritic changes with a small humeral spur.  Significant hardware noted in the cervical spine.    MRI of the right shoulder is reviewed and interpreted today:1.  Articular surface tear of the supraspinatus tendon with torn tendon fibers retracted to the level humeral head.  2.  Mild to moderate tendinosis of the supraspinatus and infraspinatus tendons.  3.  Articular surface irregularity and focal hyperintensity at the footprint of the infraspinatus tendon could reflect a small tear.  4.  SLAP tear with small 3 mm para labral cyst.  5.  Glenohumeral joint effusion.  6.  Moderate  glenohumeral osteoarthropathy. Mild degeneration of the AC joint.  7.  Trace fluid in the subacromion/subdeltoid bursa may reflect bursitis or unseen focal full-thickness tear of the rotator cuff tendons.     Assessment::  Right rotator cuff pathology  Right mild glenohumeral arthritis    Plan:  I reviewed the findings with her today. Patient has a history of prior rotator cuff tear with repair.  She has had symptoms now for about a year without resolution despite formal physical therapy.  We talked about doing another cuff repair versus a reverse total shoulder arthroplasty.  I gave her information about both.  She elected to do a subacromial injection today.  Does not really want another surgery if she can avoid it.  Follow-up as needed.    This note was created using SecureAuth voice recognition software that occasionally misinterpreted phrases or words.    Consult note is delivered via Epic messaging service.

## 2022-07-21 NOTE — PROGRESS NOTES
[facility] Department of Neurology  Virtual Visit      [facility] - NEUROLOGY 7TH FL [facility], [place]    Date: 7/22/22  Patient Name: Saira Osuna   MRN: [id number]   PCP: Primary Doctor No  Referring Provider: No ref. provider found    The patient location is: home    The chief complaint leading to consultation is: encephalopathy   Visit type: Virtual visit with synchronous audio and video  Total time spent with patient: 30 min  Each patient to whom he or she provides medical services by telemedicine is:  (1) informed of the relationship between the physician and patient and the respective role of any other health care provider with respect to management of the patient; and (2) notified that he or she may decline to receive medical services by telemedicine and may withdraw from such care at any time.    Notes:     Assessment:   Saira Osuna is a 67 y.o. female presenting as follow up after episode of acute encephalopathy likely 2/2 hyponatremia. Patient refers that she is back to her baseline, has not had any more episodes. Her sodium has been re checked and normal, she is following closely with her PCP.   Discussed importance of hydration with electrolytes, pedialyte and IV water as recommendations. Patient's BP was low a couple of days ago, encouraged monitoring BP at home which could be contributing to her fatigue  Plan:     -- Continue hydration with electrolytes   -- Monitor BP at home   -- No further workup or needs from neurology   -- RTC PRN     Problem List Items Addressed This Visit        Neuro    Encephalopathy acute       Renal/    Hyponatremia - Primary          Nabila Vera MD    Patient note was created using MModal Dictation.  Any errors in syntax or even information may not have been identified and edited on initial review prior to signing this note.  Subjective:   Patient seen in consultation at the request of No ref. provider found for the evaluation of  hyponatremia. A copy of this note will be sent to the referring physician.          HPI:   Ms. Saira Osuna is a 67 y.o. female w  PMH chronic hypoxia (uses oxygen HS), anxiety and depression presenting as follow up for episode of acute encephalopathy likely 2/2 hyponatremia. MOCA 30 on last appointment.   Today she refers that she feels better. She has a chronic issue of low energy and feeling tired, currently being worked up by her PCP. Most recent lab work unremarkable, she takes iron supplements for anemia.   BP a couple of days ago 91/51, she is not on BP medications and does not take BP at home. She refers that overall she feels at her mental status baseline. No longer dealing with stressors from before.         Initial HPI   HPI:   Ms. Saira Osuna is a 66 y.o. female w PMH chronic hypoxia (uses oxygen HS), anxiety and depression [facility] for acute encephalopathy. Patient refers that she remember waking up and after that she was confused and does not recall much details. She was admitted to [facility] with neurology consult and had MRI brain and EEG done. MRI brain showing chronic microvascular changes, EEG with diffuse slowing but no seizures. On her labs she had a low Na 128, no other electrolyte abnormalities, no infections. Patient was placed on fluid restriction but she left AMA after 2 days. Other labs drawn were B1 and B12 and normal/      Today she refers that she does not feel at her baseline as she has been having some word fingind difficulty. She feels that she looses her train of thought and has issues getting her words and other finish her sentences. This started after her encephalopathy and she denies any other issues such as getting loss while driving or walking, not recognizing peoples faces or places. She lives with her daughter at [place]. Moved to [place] from [place] in august to help her daughter who is a recovering alcoholic. She refers  that is has been complicated and stressful couple of month due to adjusting to a new place, not knowing people, not having the best relationship with her daughter. Daughter is a marine and patient served in the  for 9 years.      Some of her medications include:   wellbutrin xl 150 (3 tablets) for depression   Gabapentin PRN BID   Hydroxyzine 50mg BID for panic attcaks   Sumatriptan 100mg for migraines   Trazodone 200mg QHS insomnia.        PAST MEDICAL HISTORY:  Past Medical History:   Diagnosis Date    Constipation     Dependence on nocturnal oxygen therapy     2 liters/ nasal cannula    Depression     Emphysema lung     Encounter for blood transfusion     GERD (gastroesophageal reflux disease)     Migraines     Neck pain        PAST SURGICAL HISTORY:  Past Surgical History:   Procedure Laterality Date    AUGMENTATION OF BREAST      CERVICAL FUSION      COLONOSCOPY      COLONOSCOPY N/A 6/2/2022    Procedure: COLONOSCOPY;  Surgeon: Joni Burnett MD;  Location: [facility] ENDO;  Service: Endoscopy;  Laterality: N/A;    ENDOSCOPIC RELEASE OF BOTH CARPAL TUNNELS      2 carpal surgeries on right, one on left    ESOPHAGEAL DILATION      ESOPHAGOGASTRODUODENOSCOPY N/A 02/08/2022    Procedure: EGD (ESOPHAGOGASTRODUODENOSCOPY);  Surgeon: Joni Burnett MD;  Location: [facility] ENDO;  Service: Endoscopy;  Laterality: N/A;    INTERPOSITION ARTHROPLASTY OF CARPOMETACARPAL JOINTS Right     thumb    TONSILLECTOMY      TOTAL HIP ARTHROPLASTY Bilateral     UPPER GASTROINTESTINAL ENDOSCOPY         CURRENT MEDS:  Current Outpatient Medications   Medication Sig Dispense Refill    albuterol (PROVENTIL/VENTOLIN HFA) 90 mcg/actuation inhaler INHALE 2 PUFFS BY MOUTH FOUR TIMES A DAY AS NEEDED FOR BREATHING      aspirin (ECOTRIN) 81 MG EC tablet Take 81 mg by mouth once daily.      bisacodyL (DULCOLAX) 5 mg EC tablet Dulcolax (bisacodyl) 5 mg tablet,delayed release   take 2 (5mg) tablets as needed if no bowel  movement for 2-3 days      buPROPion (WELLBUTRIN XL) 150 MG TB24 tablet TAKE THREE TABLETS BY MOUTH EVERY DAY FOR DEPRESSION      calcium carbonate-vitamin D3 250-125 mg 250 mg-3.125 mcg (125 unit) Tab TAKE 2 TABLETS BY MOUTH EVERY DAY AS A MINERAL SUPPLEMENT      celecoxib (CELEBREX) 200 MG capsule celecoxib 200 mg capsule   Take 1 capsule twice a day by oral route.      cyanocobalamin (VITAMIN B-12) 1000 MCG tablet TAKE ONE TABLET BY MOUTH EVERY DAY FOR VITAMIN DEFICIENCIES      ferrous sulfate (FEOSOL) 325 mg (65 mg iron) Tab tablet Iron (ferrous sulfate) 325 mg (65 mg iron) tablet   Take 1 tablet every other day by oral route.      gabapentin (NEURONTIN) 300 MG capsule gabapentin 300 mg capsule   Take 1 capsule 3 times a day by oral route as needed.      hydrOXYzine (ATARAX) 50 MG tablet TAKE ONE TABLET BY MOUTH THREE TIMES A DAY AS NEEDED FOR ALLERGIES/ITCHING ANXIETY      methocarbamoL (ROBAXIN) 500 MG Tab TAKE ONE TABLET BY MOUTH THREE TIMES A DAY IF NEEDED (MUSCLE RELAXANT) (START WITH 1/2 TABLET AT BEDTIME THEN ADD IN MORNING DOSE AND IF  TOLERATED THEN TAKE 1/2 TABLET THREE TIMES A DAY FOR A FEW DAYS AND THEN  CAN INCREASE TO A FULL TABLET IF NO SIDE EFFECTS) (MUSCLE RELAXANT)  (START WITH 1/2 TABLET AT BEDTIME THEN ADD IN MORNING DOSE AND IF   TOLERATED THEN TAKE 1/2 TABLET THREE TIMES A DAY FOR A FEW DAYS AND THEN   CAN INCREASE TO A FULL TABLET IF NO SIDE EFFECTS)      omeprazole (PRILOSEC) 40 MG capsule omeprazole 40 mg capsule,delayed release   Take 1 capsule every day by oral route.      polyethylene glycol (GLYCOLAX) 17 gram/dose powder Miralax 17 gram/dose oral powder   1 capful daily at bedtime      sumatriptan (IMITREX) 100 MG tablet Take 100 mg by mouth every 2 (two) hours as needed for Migraine.      trazodone HCl (TRAZODONE ORAL) trazodone   100 mg  at night       No current facility-administered medications for this visit.       ALLERGIES:  Review of patient's allergies indicates:    Allergen Reactions    Penicillins Hives       FAMILY HISTORY:  Family History   Problem Relation Age of Onset    Breast cancer Maternal Aunt     Colon cancer Neg Hx     Colon polyps Neg Hx     Crohn's disease Neg Hx     Ulcerative colitis Neg Hx     Stomach cancer Neg Hx     Rectal cancer Neg Hx        SOCIAL HISTORY:  Social History     Tobacco Use    Smoking status: Former Smoker     Quit date: 1990     Years since quittin.5    Smokeless tobacco: Never Used   Substance Use Topics    Alcohol use: Yes     Comment: rarely    Drug use: Never       Review of Systems:  12 system review of systems is negative except for the symptoms mentioned in HPI.      Objective:   There were no vitals filed for this visit.  General: NAD, well nourished   Eyes: no tearing, discharge, no erythema   ENT: moist mucous membranes of the oral cavity, nares patent    Neck: Supple, full range of motion  Cardiovascular: Appears well perfused  Lungs: Normal work of breathing, normal chest wall excursions  Skin: No rash, lesions, or breakdown on exposed skin  Psychiatry: Mood and affect are appropriate   Abdomen: nondistended, non tender  Extremeties: No cyanosis, clubbing or edema visible    Neurological   MENTAL STATUS: Alert and oriented to person, place, and time. Attention and concentration within normal limits. Speech without dysarthria, able to name and repeat without difficulty. Recent and remote memory within normal limits   CRANIAL NERVES: Visual fields intact.  EOMI. Facial sensation intact. Face symmetrical. Hearing grossly intact. Full shoulder shrug bilaterally. Tongue protrudes midline   SENSORY:Negative Romberg.   MOTOR: Normal bulk No pronator drift.  Moves all extremities symmetrically.  REFLEXES: Deferred due to virtual visit  CEREBELLAR/COORDINATION/GAIT: Gait steady with normal arm swing and stride length.  Finger to nose intact. Normal rapid alternating movements.

## 2022-07-22 ENCOUNTER — OFFICE VISIT (OUTPATIENT)
Dept: NEUROLOGY | Facility: CLINIC | Age: 67
End: 2022-07-22
Payer: OTHER GOVERNMENT

## 2022-07-22 DIAGNOSIS — I95.9 HYPOTENSION, UNSPECIFIED HYPOTENSION TYPE: ICD-10-CM

## 2022-07-22 DIAGNOSIS — G93.40 ENCEPHALOPATHY ACUTE: ICD-10-CM

## 2022-07-22 DIAGNOSIS — E87.1 HYPONATREMIA: Primary | ICD-10-CM

## 2022-07-22 PROCEDURE — 99213 PR OFFICE/OUTPT VISIT, EST, LEVL III, 20-29 MIN: ICD-10-PCS | Mod: 95,,, | Performed by: STUDENT IN AN ORGANIZED HEALTH CARE EDUCATION/TRAINING PROGRAM

## 2022-07-22 PROCEDURE — 99213 OFFICE O/P EST LOW 20 MIN: CPT | Mod: 95,,, | Performed by: STUDENT IN AN ORGANIZED HEALTH CARE EDUCATION/TRAINING PROGRAM

## 2022-07-25 ENCOUNTER — OFFICE VISIT (OUTPATIENT)
Dept: PSYCHIATRY | Facility: CLINIC | Age: 67
End: 2022-07-25
Payer: OTHER GOVERNMENT

## 2022-07-25 DIAGNOSIS — F33.0 MILD EPISODE OF RECURRENT MAJOR DEPRESSIVE DISORDER: ICD-10-CM

## 2022-07-25 DIAGNOSIS — F41.1 GAD (GENERALIZED ANXIETY DISORDER): ICD-10-CM

## 2022-07-25 PROCEDURE — 90791 PSYCH DIAGNOSTIC EVALUATION: CPT | Mod: ,,, | Performed by: SOCIAL WORKER

## 2022-07-25 PROCEDURE — 99999 PR PBB SHADOW E&M-EST. PATIENT-LVL II: ICD-10-PCS | Mod: PBBFAC,,, | Performed by: SOCIAL WORKER

## 2022-07-25 PROCEDURE — 99212 OFFICE O/P EST SF 10 MIN: CPT | Mod: PBBFAC,PN | Performed by: SOCIAL WORKER

## 2022-07-25 PROCEDURE — 99999 PR PBB SHADOW E&M-EST. PATIENT-LVL II: CPT | Mod: PBBFAC,,, | Performed by: SOCIAL WORKER

## 2022-07-25 PROCEDURE — 90791 PR PSYCHIATRIC DIAGNOSTIC EVALUATION: ICD-10-PCS | Mod: ,,, | Performed by: SOCIAL WORKER
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
## 2022-07-29 ENCOUNTER — HOSPITAL ENCOUNTER (OUTPATIENT)
Dept: RADIOLOGY | Facility: HOSPITAL | Age: 67
Discharge: HOME | End: 2022-07-29
Payer: OTHER GOVERNMENT

## 2022-07-29 DIAGNOSIS — R13.10 DYSPHAGIA, UNSPECIFIED TYPE: ICD-10-CM

## 2022-07-29 DIAGNOSIS — K44.9 HIATAL HERNIA: ICD-10-CM

## 2022-07-29 DIAGNOSIS — Z87.19 HISTORY OF GASTROESOPHAGEAL REFLUX (GERD): ICD-10-CM

## 2022-07-29 PROCEDURE — 25500020 PHARM REV CODE 255

## 2022-07-29 PROCEDURE — 74220 FL ESOPHAGRAM COMPLETE: ICD-10-PCS | Mod: 26,,, | Performed by: RADIOLOGY

## 2022-07-29 PROCEDURE — 74220 X-RAY XM ESOPHAGUS 1CNTRST: CPT | Mod: TC

## 2022-07-29 PROCEDURE — 74220 X-RAY XM ESOPHAGUS 1CNTRST: CPT | Mod: 26,,, | Performed by: RADIOLOGY

## 2022-07-29 PROCEDURE — A9698 NON-RAD CONTRAST MATERIALNOC: HCPCS

## 2022-07-29 RX ADMIN — BARIUM SULFATE 200 ML: 0.6 SUSPENSION ORAL at 09:07

## 2022-08-02 DIAGNOSIS — R13.10 DYSPHAGIA, UNSPECIFIED TYPE: ICD-10-CM

## 2022-08-02 DIAGNOSIS — R93.3 ABNORMAL ESOPHAGRAM: Primary | ICD-10-CM

## 2022-08-02 DIAGNOSIS — K22.89 PRESBYESOPHAGUS: ICD-10-CM

## 2022-08-02 NOTE — PROGRESS NOTES
"Individual Psychotherapy (PhD/LCSW)    8/11/2022    Site:  [place]         Therapeutic Intervention: Met with patient. Outpatient - Supportive psychotherapy 45 min - CPT Code 94082    Chief complaint/reason for encounter: depression, anxiety and interpersonal     Interval history and content of current session: GAD7: 10. Saira shared that the past few weeks have been difficult, mostly because of the communication between herself and her daughter. They went to [place] over one weekend to celebrate Saira's sister's birthday, and her daughter "sat on the sofa and talked to no one the whole time." Saira expressed frustration over her daughter's eating behaviors (eating only sweets, hiding sweets in her room and her car) and how she feels that she has to walk on eggshells around her daughter. She said her daughter is not part of a recovery program (daughter is sober for 11 months) but does see a psychologist. We discussed her having a conversation with her daughter about boundaries as well as asking her if she wants Saira's opinion, as when Saira offers her a suggestion she does not want to hear it. Discussed aSira signing up for [employer] as well as attending [employer] meetings to help her make friends as well as help her cope.    Treatment plan:  · Target symptoms: recurrent depression, anxiety , adjustment  · Why chosen therapy is appropriate versus another modality: relevant to diagnosis, evidence based practice  · Outcome monitoring methods: self-report  · Therapeutic intervention type: insight oriented psychotherapy, behavior modifying psychotherapy, supportive psychotherapy    Risk parameters:  Patient reports no suicidal ideation  Patient reports no homicidal ideation  Patient reports no self-injurious behavior  Patient reports no violent behavior    Verbal deficits: None    Patient's response to intervention:  The patient's response to intervention is accepting.    Progress toward goals and other mental status " changes:  The patient's progress toward goals is fair .    Diagnosis:     ICD-10-CM ICD-9-CM   1. STACI (generalized anxiety disorder)  F41.1 300.02   2. Mild episode of recurrent major depressive disorder  F33.0 296.31       Plan:  individual psychotherapy and medication management by physician Pt to go to ED or call 911 if symptoms worsen or if she has thoughts of harming self and/or others. Pt verbalized understanding.    Return to clinic: as scheduled    Length of Service (minutes): 45      Each patient to whom he or she provides medical services by telemedicine is: (1) informed of the relationship between the physician and patient and the respective role of any other health care provider with respect to management of the patient; and (2) notified that he or she may decline to receive medical services by telemedicine and may withdraw from such care at any time.

## 2022-08-11 ENCOUNTER — OFFICE VISIT (OUTPATIENT)
Dept: PSYCHIATRY | Facility: CLINIC | Age: 67
End: 2022-08-11
Payer: OTHER GOVERNMENT

## 2022-08-11 DIAGNOSIS — F33.0 MILD EPISODE OF RECURRENT MAJOR DEPRESSIVE DISORDER: ICD-10-CM

## 2022-08-11 DIAGNOSIS — F41.1 GAD (GENERALIZED ANXIETY DISORDER): ICD-10-CM

## 2022-08-11 PROCEDURE — 99999 PR PBB SHADOW E&M-EST. PATIENT-LVL I: CPT | Mod: PBBFAC,,, | Performed by: SOCIAL WORKER

## 2022-08-11 PROCEDURE — 90834 PR PSYCHOTHERAPY W/PATIENT, 45 MIN: ICD-10-PCS | Mod: ,,, | Performed by: SOCIAL WORKER

## 2022-08-11 PROCEDURE — 99211 OFF/OP EST MAY X REQ PHY/QHP: CPT | Mod: PBBFAC,PN | Performed by: SOCIAL WORKER

## 2022-08-11 PROCEDURE — 99999 PR PBB SHADOW E&M-EST. PATIENT-LVL I: ICD-10-PCS | Mod: PBBFAC,,, | Performed by: SOCIAL WORKER

## 2022-08-11 PROCEDURE — 90834 PSYTX W PT 45 MINUTES: CPT | Mod: ,,, | Performed by: SOCIAL WORKER

## 2022-08-17 NOTE — PROGRESS NOTES
"Individual Psychotherapy (PhD/LCSW)    8/23/2022    Site:  [place]         Therapeutic Intervention: Met with patient.   Outpatient - Supportive psychotherapy 45 min - CPT Code 57584    Chief complaint/reason for encounter: depression, anxiety and interpersonal     Interval history and content of current session: Saira arrived late to our appointment today due to weather, so we had a 30 minute session. Saira shared that she had "an awful week" with her daughter where her daughter did not fulfill her responsibilities around the house and became angry when Saira knocked on her door; I provided empathic support. Saira did say that she set boundaries with her daughter about fulfilling responsibilities and doing chores, and when her daughter said that she was going to move out, Saira just said "okay" as opposed to "begging her to stay, which I usually do." She then said that this seemed to work as her daughter then did chores around the house the next day. We again discussed that Saira can continue to set boundaries but she ultimately cannot control her daughter's behavior. We also continued to discuss her getting involved in [employer], [employer], and the senior activity center in [place]. We will f/u on communication skills.     Treatment plan:  · Target symptoms: recurrent depression, anxiety , adjustment  · Why chosen therapy is appropriate versus another modality: relevant to diagnosis, evidence based practice  · Outcome monitoring methods: self-report  · Therapeutic intervention type: supportive psychotherapy    Risk parameters:  Patient reports no suicidal ideation  Patient reports no homicidal ideation  Patient reports no self-injurious behavior  Patient reports no violent behavior    Verbal deficits: None    Patient's response to intervention:  The patient's response to intervention is accepting.    Progress toward goals and other mental status changes:  The patient's progress toward goals is fair " .    Diagnosis:     ICD-10-CM ICD-9-CM   1. STACI (generalized anxiety disorder)  F41.1 300.02   2. Mild episode of recurrent major depressive disorder  F33.0 296.31       Plan:  individual psychotherapy Pt to go to ED or call 911 if symptoms worsen or if she has thoughts of harming self and/or others. Pt verbalized understanding.    Return to clinic: as scheduled    Length of Service (minutes): 45      Each patient to whom he or she provides medical services by telemedicine is: (1) informed of the relationship between the physician and patient and the respective role of any other health care provider with respect to management of the patient; and (2) notified that he or she may decline to receive medical services by telemedicine and may withdraw from such care at any time.

## 2022-08-19 ENCOUNTER — PATIENT MESSAGE (OUTPATIENT)
Dept: GASTROENTEROLOGY | Facility: CLINIC | Age: 67
End: 2022-08-19
Payer: OTHER GOVERNMENT

## 2022-08-19 ENCOUNTER — TELEPHONE (OUTPATIENT)
Dept: GASTROENTEROLOGY | Facility: CLINIC | Age: 67
End: 2022-08-19
Payer: OTHER GOVERNMENT

## 2022-08-19 NOTE — TELEPHONE ENCOUNTER
Reached out to patient to assist in schedule repeat colonoscopy. No answer. Voicemail left. Message sent on portal.

## 2022-08-19 NOTE — TELEPHONE ENCOUNTER
----- Message from Danisha Chen sent at 8/19/2022  9:52 AM CDT -----  Contact: patient  Type: Needs Medical Advice  Who Called:  patient  Best Call Back Number: [phone] (home)   Additional Information: patient needs to schedule a repeat colonoscopy

## 2022-08-23 ENCOUNTER — OFFICE VISIT (OUTPATIENT)
Dept: PSYCHIATRY | Facility: CLINIC | Age: 67
End: 2022-08-23
Payer: OTHER GOVERNMENT

## 2022-08-23 DIAGNOSIS — F33.0 MILD EPISODE OF RECURRENT MAJOR DEPRESSIVE DISORDER: ICD-10-CM

## 2022-08-23 DIAGNOSIS — F41.1 GAD (GENERALIZED ANXIETY DISORDER): ICD-10-CM

## 2022-08-23 PROCEDURE — 99999 PR PBB SHADOW E&M-EST. PATIENT-LVL II: CPT | Mod: PBBFAC,,, | Performed by: SOCIAL WORKER

## 2022-08-23 PROCEDURE — 99999 PR PBB SHADOW E&M-EST. PATIENT-LVL II: ICD-10-PCS | Mod: PBBFAC,,, | Performed by: SOCIAL WORKER

## 2022-08-23 PROCEDURE — 90832 PSYTX W PT 30 MINUTES: CPT | Mod: ,,, | Performed by: SOCIAL WORKER

## 2022-08-23 PROCEDURE — 90832 PR PSYCHOTHERAPY W/PATIENT, 30 MIN: ICD-10-PCS | Mod: ,,, | Performed by: SOCIAL WORKER

## 2022-08-23 PROCEDURE — 99212 OFFICE O/P EST SF 10 MIN: CPT | Mod: PBBFAC,PN | Performed by: SOCIAL WORKER

## 2022-08-25 ENCOUNTER — PATIENT MESSAGE (OUTPATIENT)
Dept: PSYCHIATRY | Facility: CLINIC | Age: 67
End: 2022-08-25
Payer: OTHER GOVERNMENT

## 2022-08-26 NOTE — PROGRESS NOTES
Individual Psychotherapy (PhD/LCSW)    9/7/2022    Site:  [facility]     Patient presents at home in [place], [place] for follow up audiovisual telehealth visit.     Therapeutic Intervention: Met with patient.  Outpatient - Supportive psychotherapy 45 min - CPT Code 11324    Chief complaint/reason for encounter: depression, anxiety and interpersonal     Interval history and content of current session: GAD7: 12. Saira presented to session tearful today, and I engaged in active listening and provided empathic support as Saira continued to share about the relationship dynamics with her daughter and her daughter's struggles with her own mental health diagnoses. We continued to discuss the importance of setting boundaries and her not taking on responsibility for her daughter's actions; her daughter may need to face negative consequences in order to recognize that she may need more help. We also discussed the importance of Saira asking her daughter if she wants to talk or would like Saira's opinion as opposed to giving it to her; discussed the importance of validating her daughter's emotions. Discussed that ultimately we cannot change her daughter and how we can focus on Saira living a rich and full life. She has applied for [employer] and has gone to the [facility]. I also recommended that she look into [employer] meetings as well as the [facility] family support group; resources given.     Treatment plan:  Target symptoms: recurrent depression, anxiety , adjustment  Why chosen therapy is appropriate versus another modality: relevant to diagnosis, patient responds to this modality, evidence based practice  Outcome monitoring methods: self-report, checklist/rating scale  Therapeutic intervention type: supportive psychotherapy    Risk parameters:  Patient reports no suicidal ideation  Patient reports no homicidal ideation  Patient reports no self-injurious behavior  Patient reports no violent behavior    Verbal deficits:  None    Patient's response to intervention:  The patient's response to intervention is accepting.    Progress toward goals and other mental status changes:  The patient's progress toward goals is fair .    Diagnosis:     ICD-10-CM ICD-9-CM   1. STACI (generalized anxiety disorder)  F41.1 300.02   2. Mild episode of recurrent major depressive disorder  F33.0 296.31       Plan:  individual psychotherapy and medication management by physician Pt to go to ED or call 911 if symptoms worsen or if she has thoughts of harming self and/or others. Pt verbalized understanding.    Return to clinic: as scheduled    Length of Service (minutes): 45      Each patient to whom he or she provides medical services by telemedicine is: (1) informed of the relationship between the physician and patient and the respective role of any other health care provider with respect to management of the patient; and (2) notified that he or she may decline to receive medical services by telemedicine and may withdraw from such care at any time.

## 2022-08-29 ENCOUNTER — TELEPHONE (OUTPATIENT)
Dept: GASTROENTEROLOGY | Facility: CLINIC | Age: 67
End: 2022-08-29
Payer: OTHER GOVERNMENT

## 2022-08-29 ENCOUNTER — PATIENT MESSAGE (OUTPATIENT)
Dept: GASTROENTEROLOGY | Facility: CLINIC | Age: 67
End: 2022-08-29
Payer: OTHER GOVERNMENT

## 2022-08-29 DIAGNOSIS — Z86.010 HISTORY OF COLON POLYPS: Primary | ICD-10-CM

## 2022-08-29 NOTE — TELEPHONE ENCOUNTER
----- Message from Danisha Chen sent at 8/19/2022  9:52 AM CDT -----  Contact: patient  Type: Needs Medical Advice  Who Called:  patient  Best Call Back Number: [phone] (home)   Additional Information: patient needs to schedule a repeat colonoscopy

## 2022-09-07 ENCOUNTER — PATIENT MESSAGE (OUTPATIENT)
Dept: PSYCHIATRY | Facility: CLINIC | Age: 67
End: 2022-09-07
Payer: OTHER GOVERNMENT

## 2022-09-07 ENCOUNTER — OFFICE VISIT (OUTPATIENT)
Dept: PSYCHIATRY | Facility: CLINIC | Age: 67
End: 2022-09-07
Payer: OTHER GOVERNMENT

## 2022-09-07 DIAGNOSIS — F33.0 MILD EPISODE OF RECURRENT MAJOR DEPRESSIVE DISORDER: ICD-10-CM

## 2022-09-07 DIAGNOSIS — F41.1 GAD (GENERALIZED ANXIETY DISORDER): ICD-10-CM

## 2022-09-07 PROCEDURE — 90834 PR PSYCHOTHERAPY W/PATIENT, 45 MIN: ICD-10-PCS | Mod: 95,,, | Performed by: SOCIAL WORKER

## 2022-09-07 PROCEDURE — 90834 PSYTX W PT 45 MINUTES: CPT | Mod: 95,,, | Performed by: SOCIAL WORKER

## 2022-09-20 NOTE — PROGRESS NOTES
"Individual Psychotherapy (PhD/LCSW)    9/28/2022    Site:  [facility]    Patient presents at her home in [place], [place] for follow up audiovisual telehealth visit.      Therapeutic Intervention: Met with patient.  Outpatient - Supportive psychotherapy 30 min - CPT Code 98599    Chief complaint/reason for encounter: depression, anxiety, and interpersonal     Interval history and content of current session: GAD7: 7. Saira shared that overall she is doing "better." She shared that lately communication with her daughter has been better, explaining that her daughter has been more willing to do things around the house and has been less defensive. We discussed how Saira changing the way that she is communicating with her daughter as well as making the decision to have a more "hands off" approach to her daughter and her daughter's behaviors are helping their relationship as well as helping Saira's anxiety. Discussed that Saira is officially a member of [employer]; she plans to attend some events in the next month. We also discussed her going to the [employer] as well as going for walks in the park. Saira is going to the beach next weekend with her sister which will most likely be beneficial. Saira declines needing anything further today and decided to end the session early.     Treatment plan:  Target symptoms: recurrent depression, anxiety , adjustment  Why chosen therapy is appropriate versus another modality: relevant to diagnosis, patient responds to this modality, evidence based practice  Outcome monitoring methods: self-report, checklist/rating scale  Therapeutic intervention type: supportive psychotherapy    Risk parameters:  Patient reports no suicidal ideation  Patient reports no homicidal ideation  Patient reports no self-injurious behavior  Patient reports no violent behavior    Verbal deficits: None    Patient's response to intervention:  The patient's response to intervention is accepting.    Progress toward " goals and other mental status changes:  The patient's progress toward goals is fair , good.    Diagnosis:     ICD-10-CM ICD-9-CM   1. STACI (generalized anxiety disorder)  F41.1 300.02   2. Mild episode of recurrent major depressive disorder  F33.0 296.31       Plan:  individual psychotherapy and medication management by physician Pt to go to ED or call 911 if symptoms worsen or if she has thoughts of harming self and/or others. Pt verbalized understanding.    Return to clinic: as scheduled    Length of Service (minutes): 30      Each patient to whom he or she provides medical services by telemedicine is: (1) informed of the relationship between the physician and patient and the respective role of any other health care provider with respect to management of the patient; and (2) notified that he or she may decline to receive medical services by telemedicine and may withdraw from such care at any time.

## 2022-09-26 ENCOUNTER — PATIENT MESSAGE (OUTPATIENT)
Dept: GASTROENTEROLOGY | Facility: CLINIC | Age: 67
End: 2022-09-26
Payer: OTHER GOVERNMENT

## 2022-09-28 ENCOUNTER — OFFICE VISIT (OUTPATIENT)
Dept: PSYCHIATRY | Facility: CLINIC | Age: 67
End: 2022-09-28
Payer: OTHER GOVERNMENT

## 2022-09-28 ENCOUNTER — PATIENT MESSAGE (OUTPATIENT)
Dept: GASTROENTEROLOGY | Facility: CLINIC | Age: 67
End: 2022-09-28
Payer: OTHER GOVERNMENT

## 2022-09-28 DIAGNOSIS — F41.1 GAD (GENERALIZED ANXIETY DISORDER): ICD-10-CM

## 2022-09-28 DIAGNOSIS — F33.0 MILD EPISODE OF RECURRENT MAJOR DEPRESSIVE DISORDER: ICD-10-CM

## 2022-09-28 PROCEDURE — 90832 PR PSYCHOTHERAPY W/PATIENT, 30 MIN: ICD-10-PCS | Mod: 95,,, | Performed by: SOCIAL WORKER

## 2022-09-28 PROCEDURE — 90832 PSYTX W PT 30 MINUTES: CPT | Mod: 95,,, | Performed by: SOCIAL WORKER

## 2022-09-30 ENCOUNTER — ANESTHESIA (OUTPATIENT)
Dept: ENDOSCOPY | Facility: HOSPITAL | Age: 67
End: 2022-09-30
Payer: OTHER GOVERNMENT

## 2022-09-30 ENCOUNTER — ANESTHESIA EVENT (OUTPATIENT)
Dept: ENDOSCOPY | Facility: HOSPITAL | Age: 67
End: 2022-09-30
Payer: OTHER GOVERNMENT

## 2022-09-30 ENCOUNTER — HOSPITAL ENCOUNTER (OUTPATIENT)
Facility: HOSPITAL | Age: 67
Discharge: HOME | End: 2022-09-30
Attending: INTERNAL MEDICINE | Admitting: INTERNAL MEDICINE
Payer: OTHER GOVERNMENT

## 2022-09-30 VITALS
OXYGEN SATURATION: 99 % | DIASTOLIC BLOOD PRESSURE: 71 MMHG | HEIGHT: 60 IN | SYSTOLIC BLOOD PRESSURE: 144 MMHG | WEIGHT: 105 LBS | HEART RATE: 71 BPM | TEMPERATURE: 97 F | BODY MASS INDEX: 20.62 KG/M2 | RESPIRATION RATE: 19 BRPM

## 2022-09-30 DIAGNOSIS — Z86.010 HX OF COLONIC POLYPS: ICD-10-CM

## 2022-09-30 PROCEDURE — 63600175 PHARM REV CODE 636 W HCPCS: Performed by: NURSE ANESTHETIST, CERTIFIED REGISTERED

## 2022-09-30 PROCEDURE — 88342 IMHCHEM/IMCYTCHM 1ST ANTB: CPT | Mod: 26,,, | Performed by: PATHOLOGY

## 2022-09-30 PROCEDURE — 88342 CHG IMMUNOCYTOCHEMISTRY: ICD-10-PCS | Mod: 26,,, | Performed by: PATHOLOGY

## 2022-09-30 PROCEDURE — 45380 COLONOSCOPY AND BIOPSY: CPT | Mod: 33,,, | Performed by: INTERNAL MEDICINE

## 2022-09-30 PROCEDURE — 27201012 HC FORCEPS, HOT/COLD, DISP: Performed by: INTERNAL MEDICINE

## 2022-09-30 PROCEDURE — D9220A PRA ANESTHESIA: ICD-10-PCS | Mod: 33,CRNA,, | Performed by: NURSE ANESTHETIST, CERTIFIED REGISTERED

## 2022-09-30 PROCEDURE — 00812 ANES LWR INTST SCR COLSC: CPT | Performed by: INTERNAL MEDICINE

## 2022-09-30 PROCEDURE — 88342 IMHCHEM/IMCYTCHM 1ST ANTB: CPT | Performed by: PATHOLOGY

## 2022-09-30 PROCEDURE — 25000003 PHARM REV CODE 250: Performed by: INTERNAL MEDICINE

## 2022-09-30 PROCEDURE — 88305 TISSUE EXAM BY PATHOLOGIST: CPT | Performed by: PATHOLOGY

## 2022-09-30 PROCEDURE — 37000008 HC ANESTHESIA 1ST 15 MINUTES: Performed by: INTERNAL MEDICINE

## 2022-09-30 PROCEDURE — 45380 PR COLONOSCOPY,BIOPSY: ICD-10-PCS | Mod: 33,,, | Performed by: INTERNAL MEDICINE

## 2022-09-30 PROCEDURE — 25000003 PHARM REV CODE 250: Performed by: NURSE ANESTHETIST, CERTIFIED REGISTERED

## 2022-09-30 PROCEDURE — 88305 TISSUE EXAM BY PATHOLOGIST: CPT | Mod: 26,,, | Performed by: PATHOLOGY

## 2022-09-30 PROCEDURE — 88305 TISSUE EXAM BY PATHOLOGIST: ICD-10-PCS | Mod: 26,,, | Performed by: PATHOLOGY

## 2022-09-30 PROCEDURE — 37000009 HC ANESTHESIA EA ADD 15 MINS: Performed by: INTERNAL MEDICINE

## 2022-09-30 PROCEDURE — D9220A PRA ANESTHESIA: Mod: 33,CRNA,, | Performed by: NURSE ANESTHETIST, CERTIFIED REGISTERED

## 2022-09-30 PROCEDURE — 45380 COLONOSCOPY AND BIOPSY: CPT | Mod: PT | Performed by: INTERNAL MEDICINE

## 2022-09-30 PROCEDURE — D9220A PRA ANESTHESIA: ICD-10-PCS | Mod: 33,ANES,, | Performed by: ANESTHESIOLOGY

## 2022-09-30 PROCEDURE — D9220A PRA ANESTHESIA: Mod: 33,ANES,, | Performed by: ANESTHESIOLOGY

## 2022-09-30 RX ORDER — SODIUM CHLORIDE 9 MG/ML
INJECTION, SOLUTION INTRAVENOUS CONTINUOUS
Status: DISCONTINUED | OUTPATIENT
Start: 2022-09-30 | End: 2022-09-30 | Stop reason: HOSPADM

## 2022-09-30 RX ORDER — LIDOCAINE HCL/PF 100 MG/5ML
SYRINGE (ML) INTRAVENOUS
Status: DISCONTINUED | OUTPATIENT
Start: 2022-09-30 | End: 2022-09-30

## 2022-09-30 RX ORDER — ONDANSETRON 2 MG/ML
INJECTION INTRAMUSCULAR; INTRAVENOUS
Status: DISCONTINUED | OUTPATIENT
Start: 2022-09-30 | End: 2022-09-30

## 2022-09-30 RX ORDER — PROPOFOL 10 MG/ML
VIAL (ML) INTRAVENOUS
Status: DISCONTINUED | OUTPATIENT
Start: 2022-09-30 | End: 2022-09-30

## 2022-09-30 RX ADMIN — PROPOFOL 20 MG: 10 INJECTION, EMULSION INTRAVENOUS at 08:09

## 2022-09-30 RX ADMIN — GLYCOPYRROLATE 0.1 MG: 0.2 INJECTION, SOLUTION INTRAMUSCULAR; INTRAVITREAL at 08:09

## 2022-09-30 RX ADMIN — LIDOCAINE HYDROCHLORIDE 20 MG: 20 INJECTION INTRAVENOUS at 08:09

## 2022-09-30 RX ADMIN — PROPOFOL 80 MG: 10 INJECTION, EMULSION INTRAVENOUS at 08:09

## 2022-09-30 RX ADMIN — SODIUM CHLORIDE: 0.9 INJECTION, SOLUTION INTRAVENOUS at 08:09

## 2022-09-30 RX ADMIN — ONDANSETRON 4 MG: 2 INJECTION INTRAMUSCULAR; INTRAVENOUS at 08:09

## 2022-09-30 NOTE — ANESTHESIA POSTPROCEDURE EVALUATION
Anesthesia Post Evaluation    Patient: Saira Osuna    Procedure(s) Performed: Procedure(s) (LRB):  COLONOSCOPY (N/A)    Final Anesthesia Type: general      Patient location during evaluation: PACU  Patient participation: Yes- Able to Participate  Level of consciousness: awake and alert  Post-procedure vital signs: reviewed and stable  Pain management: adequate  Airway patency: patent    PONV status at discharge: No PONV  Anesthetic complications: no      Cardiovascular status: stable  Respiratory status: unassisted and room air  Hydration status: euvolemic            Vitals Value Taken Time   /71 09/30/22 0905        Pulse 71 09/30/22 0910   Resp 19 09/30/22 0910   SpO2 99 % 09/30/22 0910         Event Time   Out of Recovery 09:26:00         Pain/Alfredo Score: Alfredo Score: 10 (9/30/2022  9:10 AM)

## 2022-09-30 NOTE — H&P
CC: History of colon polyps with recent piecemeal removal of large polyp    67 year old female with above. States that symptoms are absent, no alleviating/exacerbating factors. No family history of colorectal CA. Positive personal history of polyps. No bleeding or weight loss.     ROS:  No headache, no fever/chills, no chest pain/SOB, no nausea/vomiting/diarrhea/constipation/GI bleeding/abdominal pain, no dysuria/hematuria.    VSSAF   Exam:   Alert and oriented x 3; no apparent distress   PERRLA, sclera anicteric  CV: Regular rate/rhythm, normal PMI   Lungs: Clear bilaterally with no wheeze/rales   Abdomen: Soft, NT/ND, normal bowel sounds   Ext: No cyanosis, clubbing     Impression:   As above    Plan:   Proceed with endoscopy. Further recs to follow.

## 2022-09-30 NOTE — TRANSFER OF CARE
Anesthesia Transfer of Care Note    Patient: Saira Osuna    Procedure(s) Performed: Procedure(s) (LRB):  COLONOSCOPY (N/A)    Patient location: PACU    Anesthesia Type: general    Transport from OR: Transported from OR on room air with adequate spontaneous ventilation    Post pain: adequate analgesia    Post assessment: no apparent anesthetic complications and tolerated procedure well    Post vital signs: stable    Nausea/Vomiting: no nausea/vomiting    Complications: none    Transfer of care protocol was followed      Last vitals:   Visit Vitals  /70   Pulse 61   Temp 36.2 °C (97.2 °F) (Skin)   Resp 16   Ht 5' (1.524 m)   Wt 47.6 kg (105 lb)   SpO2 95%   Breastfeeding No   BMI 20.51 kg/m²

## 2022-09-30 NOTE — PLAN OF CARE
Vss, [place] po fluids, denies pain, ambulates easily. IV removed, catheter intact. Discharge instructions provided and states understanding. States ready to go home.  Discharged from facility with family per wheelchair.

## 2022-09-30 NOTE — ANESTHESIA PREPROCEDURE EVALUATION
09/30/2022  Saira Osuna is a 67 y.o., female.      Pre-op Assessment    I have reviewed the Patient Summary Reports.     I have reviewed the Nursing Notes. I have reviewed the NPO Status.   I have reviewed the Medications.     Review of Systems  Anesthesia Hx:  No problems with previous Anesthesia    Social:  Former Smoker    Pulmonary:   COPD    Hepatic/GI:   GERD    Neurological:   Headaches    Psych:   Psychiatric History depression          Physical Exam  General: Well nourished, Cooperative, Alert and Oriented    Airway:  Mallampati: II   Mouth Opening: Normal  TM Distance: Normal  Neck ROM: Normal ROM    Dental:  Caps / Implants  No upper teeth       Anesthesia Plan  Type of Anesthesia, risks & benefits discussed:    Anesthesia Type: Gen Natural Airway  Intra-op Monitoring Plan: Standard ASA Monitors  Induction:  IV  Informed Consent: Informed consent signed with the Patient and all parties understand the risks and agree with anesthesia plan.  All questions answered.   ASA Score: 3  Day of Surgery Review of History & Physical: H&P Update referred to the surgeon/provider.    Ready For Surgery From Anesthesia Perspective.     .

## 2022-09-30 NOTE — PROVATION PATIENT INSTRUCTIONS
Discharge Summary/Instructions after an Endoscopic Procedure  Patient Name: Saira Osuna  Patient MRN: [id number]  Patient YOB: 1955 Friday, September 30, 2022  Joni Murrell MD  Dear patient,  As a result of recent federal legislation (The Federal Cures Act), you may   receive lab or pathology results from your procedure in your [facility]   account before your physician is able to contact you. Your physician or   their representative will relay the results to you with their   recommendations at their soonest availability.  Thank you,  RESTRICTIONS:  During your procedure today, you received medications for sedation.  These   medications may affect your judgment, balance and coordination.  Therefore,   for 24 hours, you have the following restrictions:   - DO NOT drive a car, operate machinery, make legal/financial decisions,   sign important papers or drink alcohol.    ACTIVITY:  Today: no heavy lifting, straining or running due to procedural   sedation/anesthesia.  The following day: return to full activity including work.  DIET:  Eat and drink normally unless instructed otherwise.     TREATMENT FOR COMMON SIDE EFFECTS:  - Mild abdominal pain, nausea, belching, bloating or excessive gas:  rest,   eat lightly and use a heating pad.  - Sore Throat: treat with throat lozenges and/or gargle with warm salt   water.  - Because air was used during the procedure, expelling large amounts of air   from your rectum or belching is normal.  - If a bowel prep was taken, you may not have a bowel movement for 1-3 days.    This is normal.  SYMPTOMS TO WATCH FOR AND REPORT TO YOUR PHYSICIAN:  1. Abdominal pain or bloating, other than gas cramps.  2. Chest pain.  3. Back pain.  4. Signs of infection such as: chills or fever occurring within 24 hours   after the procedure.  5. Rectal bleeding, which would show as bright red, maroon, or black stools.   (A tablespoon of blood from the rectum is not serious, especially if    hemorrhoids are present.)  6. Vomiting.  7. Weakness or dizziness.  GO DIRECTLY TO THE NEAREST EMERGENCY ROOM IF YOU HAVE ANY OF THE FOLLOWING:      Difficulty breathing              Chills and/or fever over 101 F   Persistent vomiting and/or vomiting blood   Severe abdominal pain   Severe chest pain   Black, tarry stools   Bleeding- more than one tablespoon   Any other symptom or condition that you feel may need urgent attention  Your doctor recommends these additional instructions:  If any biopsies were taken, your doctors clinic will contact you in 1 to 2   weeks with any results.  - Patient has a contact number available for emergencies.  The signs and   symptoms of potential delayed complications were discussed with the   patient.  Return to normal activities tomorrow.  Written discharge   instructions were provided to the patient.   - High fiber diet.   - Continue present medications.   - Await pathology results.   - Repeat colonoscopy in 3 years for surveillance.   - Discharge patient to home (ambulatory).   - Return to my office PRN.  For questions, problems or results please call your physician - Joni Murrell MD at Work:  [phone].  [facility] PHONE NUMBER: [phone]  IF A COMPLICATION OR EMERGENCY SITUATION ARISES AND YOU ARE UNABLE TO REACH   YOUR PHYSICIAN - GO DIRECTLY TO THE EMERGENCY ROOM.  Joni Murrell MD  9/30/2022 8:54:49 AM  This report has been verified and signed electronically.  Dear patient,  As a result of recent federal legislation (The Federal Cures Act), you may   receive lab or pathology results from your procedure in your [facility]   account before your physician is able to contact you. Your physician or   their representative will relay the results to you with their   recommendations at their soonest availability.  Thank you,  PROVATION

## 2022-10-04 NOTE — PROGRESS NOTES
Please advise patient that no polyp tissue was seen at the ulcer site.  Follow up as previously recommended.  Repeat colonoscopy in 3 years.

## 2022-10-05 LAB
FINAL PATHOLOGIC DIAGNOSIS: NORMAL
GROSS: NORMAL
Lab: NORMAL
SUPPLEMENTAL DIAGNOSIS: NORMAL

## 2022-11-04 ENCOUNTER — PATIENT MESSAGE (OUTPATIENT)
Dept: PSYCHIATRY | Facility: CLINIC | Age: 67
End: 2022-11-04
Payer: OTHER GOVERNMENT

## 2022-11-14 ENCOUNTER — TELEPHONE (OUTPATIENT)
Dept: PSYCHIATRY | Facility: CLINIC | Age: 67
End: 2022-11-14
Payer: OTHER GOVERNMENT

## 2022-11-14 NOTE — TELEPHONE ENCOUNTER
----- Message from Kandace Cueva MA sent at 11/3/2022  3:27 PM CDT -----  Regarding: FW: Warning Letter    ----- Message -----  From: Aileen Concepcion LCSW  Sent: 11/3/2022   2:25 PM CDT  To: Carlene Gallagher Staff  Subject: Warning Letter                                   Hi all,     Could we please send a warning letter for patient's no show on 11/1?     Thank you,     Aileen

## 2023-01-12 ENCOUNTER — TELEPHONE (OUTPATIENT)
Dept: OPHTHALMOLOGY | Facility: CLINIC | Age: 68
End: 2023-01-12
Payer: OTHER GOVERNMENT

## 2023-01-12 NOTE — TELEPHONE ENCOUNTER
[place] for pt to call back to schedule appt     -TD----- Message from Aziza Aponte sent at 1/12/2023  9:47 AM CST -----  Good morning,     Dr. Desi Clemons  would like to refer the following patient to Ophthalmology.  The patients diagnosis is double vision. I have scanned the patients referral and records into .       Thank you,   Aziza Lee

## 2023-01-13 ENCOUNTER — TELEPHONE (OUTPATIENT)
Dept: OPTOMETRY | Facility: CLINIC | Age: 68
End: 2023-01-13
Payer: OTHER GOVERNMENT

## 2023-01-13 NOTE — TELEPHONE ENCOUNTER
----- Message from Jen Dowling sent at 1/13/2023  1:52 PM CST -----  Contact: Pt  Pt is requesting a callback in regards to [facility]. Pt is complaining of double vision and would like to be seen soon.  Please adv pt.       Confirmed contact below:   Contact Name:Saira Osuna  Phone Number: [phone]

## 2023-04-19 DIAGNOSIS — Z12.31 SCREENING MAMMOGRAM FOR HIGH-RISK PATIENT: Primary | ICD-10-CM

## 2023-04-28 ENCOUNTER — TELEPHONE (OUTPATIENT)
Dept: ORTHOPEDICS | Facility: CLINIC | Age: 68
End: 2023-04-28
Payer: OTHER GOVERNMENT

## 2023-04-28 NOTE — TELEPHONE ENCOUNTER
Spoke to patient in regards to her appointment. Patient stated that she was coming in for pain. Ask patient where was the pain coming from? Stated that it is cervical andl ow back pain . Stated to patient that we will need to put in x-ray orders. For neck and back. Patient then stated that she had [facility] done at [facility]. Ask patient if she had a copy of it on a disc. Patient stated no that she will work on getting it before her appointment date. Then stated that I will call her back on Monday to see if she have a copy of the x-ray. Patient stated understand. Stated thank you. Thanks.

## 2023-05-01 NOTE — PROGRESS NOTES
DATE: 5/2/2023  PATIENT: Saira Osuna    Supervising Physician: Cyrus Mon M.D.    CHIEF COMPLAINT: neck and low back pain    HISTORY:  Saira Osuna is a 68 y.o. female with pmhx of C1-T2 fusion in 2021 here for initial evaluation of low back pain (Back - 5). The pain has been present for years. The patient describes the pain as dull.  The pain is worse with walking and improved by rest. There is no associated numbness and tingling. There is no subjective weakness. Prior treatments have included advil, tylenol, robaxin and gabapentin, but no HEATHER or surgery.    The patient also has complaints of left neck pain (Neck - 6).  The pain has been present for about 3 months. The patient describes the pain as aching and tightness. The pain is worse with neck rotation and improved by heat and massage. There is associated numbness and tingling in her head which was present prior to the fusion in 2021. There is no subjective weakness. Prior treatments have included advil, tylenol, robaxin and gabapentin, but no HEATHER.   The patient denies myelopathic symptoms such as handwriting changes or difficulty with buttons/coins/keys. Denies perineal paresthesias, bowel/bladder dysfunction.    PAST MEDICAL/SURGICAL HISTORY:  Past Medical History:   Diagnosis Date    Constipation     Dependence on nocturnal oxygen therapy     2 liters/ nasal cannula    Depression     Emphysema lung     Encounter for blood transfusion     GERD (gastroesophageal reflux disease)     Migraines     Neck pain      Past Surgical History:   Procedure Laterality Date    AUGMENTATION OF BREAST      CERVICAL FUSION      COLONOSCOPY      COLONOSCOPY N/A 6/2/2022    Procedure: COLONOSCOPY;  Surgeon: Joni Burnett MD;  Location: [facility] ENDO;  Service: Endoscopy;  Laterality: N/A;    COLONOSCOPY N/A 9/30/2022    Procedure: COLONOSCOPY;  Surgeon: Joni Burnett MD;  Location: [facility] ENDO;  Service: Endoscopy;  Laterality: N/A;    ENDOSCOPIC RELEASE OF BOTH CARPAL  TUNNELS      2 carpal surgeries on right, one on left    ESOPHAGEAL DILATION      ESOPHAGOGASTRODUODENOSCOPY N/A 02/08/2022    Procedure: EGD (ESOPHAGOGASTRODUODENOSCOPY);  Surgeon: Joni Burnett MD;  [facility];  Service: Endoscopy;  Laterality: N/A;    INTERPOSITION ARTHROPLASTY OF CARPOMETACARPAL JOINTS Right     thumb    TONSILLECTOMY      TOTAL HIP ARTHROPLASTY Bilateral     UPPER GASTROINTESTINAL ENDOSCOPY         Medications:   Current Outpatient Medications on File Prior to Visit   Medication Sig Dispense Refill    acetaminophen (TYLENOL) 325 MG tablet Take 325 mg by mouth.      aspirin (ECOTRIN) 81 MG EC tablet Take 81 mg by mouth once daily.      bisacodyL (DULCOLAX) 5 mg EC tablet Dulcolax (bisacodyl) 5 mg tablet,delayed release   take 2 (5mg) tablets as needed if no bowel movement for 2-3 days      buPROPion (WELLBUTRIN XL) 150 MG TB24 tablet TAKE THREE TABLETS BY MOUTH EVERY DAY FOR DEPRESSION      calcium carbonate-vitamin D3 250-125 mg 250 mg-3.125 mcg (125 unit) Tab TAKE 2 TABLETS BY MOUTH EVERY DAY AS A MINERAL SUPPLEMENT      cyanocobalamin (VITAMIN B-12) 1000 MCG tablet TAKE ONE TABLET BY MOUTH EVERY DAY FOR VITAMIN DEFICIENCIES      ferrous sulfate (FEOSOL) 325 mg (65 mg iron) Tab tablet Iron (ferrous sulfate) 325 mg (65 mg iron) tablet   Take 1 tablet every other day by oral route.      hydrOXYzine (ATARAX) 50 MG tablet TAKE ONE TABLET BY MOUTH THREE TIMES A DAY AS NEEDED FOR ALLERGIES/ITCHING ANXIETY      hydrOXYzine (ATARAX) 50 MG tablet Take 50 mg by mouth.      LIDOcaine (XYLOCAINE) 5 % Oint ointment Apply topically.      methocarbamoL (ROBAXIN) 500 MG Tab TAKE ONE TABLET BY MOUTH THREE TIMES A DAY IF NEEDED (MUSCLE RELAXANT) (START WITH 1/2 TABLET AT BEDTIME THEN ADD IN MORNING DOSE AND IF  TOLERATED THEN TAKE 1/2 TABLET THREE TIMES A DAY FOR A FEW DAYS AND THEN  CAN INCREASE TO A FULL TABLET IF NO SIDE EFFECTS) (MUSCLE RELAXANT)  (START WITH 1/2 TABLET AT BEDTIME THEN ADD IN  MORNING DOSE AND IF   TOLERATED THEN TAKE 1/2 TABLET THREE TIMES A DAY FOR A FEW DAYS AND THEN   CAN INCREASE TO A FULL TABLET IF NO SIDE EFFECTS)      omeprazole (PRILOSEC OTC) 20 MG tablet Take 20 mg by mouth.      omeprazole (PRILOSEC) 40 MG capsule omeprazole 40 mg capsule,delayed release   Take 1 capsule every day by oral route.      polyethylene glycol (GLYCOLAX) 17 gram/dose powder Miralax 17 gram/dose oral powder   1 capful daily at bedtime      sumatriptan (IMITREX) 100 MG tablet Take 100 mg by mouth every 2 (two) hours as needed for Migraine.      trazodone HCl (TRAZODONE ORAL) trazodone   100 mg  at night      [DISCONTINUED] gabapentin (NEURONTIN) 300 MG capsule gabapentin 300 mg capsule   Take 1 capsule 3 times a day by oral route as needed.      [DISCONTINUED] albuterol (PROVENTIL/VENTOLIN HFA) 90 mcg/actuation inhaler INHALE 2 PUFFS BY MOUTH FOUR TIMES A DAY AS NEEDED FOR BREATHING      [DISCONTINUED] celecoxib (CELEBREX) 200 MG capsule celecoxib 200 mg capsule   Take 1 capsule twice a day by oral route.       No current facility-administered medications on file prior to visit.       Social History:   Social History     Socioeconomic History    Marital status: Single   Tobacco Use    Smoking status: Former     Types: Cigarettes     Quit date: 1990     Years since quittin.3    Smokeless tobacco: Never   Substance and Sexual Activity    Alcohol use: Yes     Comment: rarely    Drug use: Never       REVIEW OF SYSTEMS:  Constitution: Negative. Negative for chills, fever and night sweats.   Cardiovascular: Negative for chest pain and syncope.   Respiratory: Negative for cough and shortness of breath.   Gastrointestinal: See HPI. Negative for nausea/vomiting. Negative for abdominal pain.  Genitourinary: See HPI. Negative for discoloration or dysuria.  Skin: Negative for dry skin, itching and rash.   Hematologic/Lymphatic: Negative for bleeding problem. Does not bruise/bleed easily.    Musculoskeletal: Negative for falls and muscle weakness.   Neurological: See HPI. No seizures.   Endocrine: Negative for polydipsia, polyphagia and polyuria.   Allergic/Immunologic: Negative for hives and persistent infections.     EXAM:  Ht 5' (1.524 m)   Wt 48.2 kg (106 lb 6 oz)   BMI 20.77 kg/m²     PHYSICAL EXAMINATION:    General: The patient is a pleasant 68 y.o. female in no apparent distress, the patient is oriented to person, place and time.  Psych: Normal mood and affect  HEENT: Vision grossly intact, hearing intact to the spoken word.  Lungs: Respirations unlabored.  Gait: Normal station and gait, no difficulty with toe or heel walk.   Skin: Cervical skin and dorsal lumbar skin negative for rashes, lesions, hairy patches and surgical scars.    Range of motion: Cervical and lumbar range of motion is limited. There is mild tenderness to palpation of the paracervical muscles.  There is mild lumbar tenderness to palpation.  Spinal Balance: Global saggital and coronal spinal balance acceptable, no significant for scoliosis and kyphosis.  Musculoskeletal: No pain with the range of motion of the bilateral shoulders and elbows. Normal bulk and contour of the bilateral hands.  No pain with the range of motion of the bilateral hips. Mild bilateral trochanteric tenderness to palpation.  Vascular: Bilateral upper and lower extremities warm and well perfused, radial pulses 2+ bilaterally, dorsalis pedis pulses 2+ bilaterally.  Neurological: Normal strength and tone in all major motor groups in the bilateral upper and lower extremities. Normal sensation to light touch in the C5-T1 and L2-S1 dermatomes bilaterally.  Deep tendon reflexes symmetric 2+ in the bilateral upper and lower extremities.  Negative Inverted Radial Reflex and Posadas's bilaterally. Negative Babinski bilaterally. Negative straight leg raise bilaterally.     Body mass index is 20.77 kg/m².    No results found for:  HGBA1C      ASSESSMENT/PLAN:    Saira was seen today for neck pain and low-back pain.    Diagnoses and all orders for this visit:    Dorsalgia, unspecified  -     MRI Lumbar Spine Without Contrast; Future    Pain in thoracic spine  -     X-Ray Thoracic Spine AP Lateral; Future  -     MRI Thoracic Spine Without Contrast; Future    DDD (degenerative disc disease), lumbar  -     X-Ray Lumbar Spine Ap Lateral w/Flex Ext; Future    S/P cervical spinal fusion  -     X-Ray Cervical Spine AP Lat with Flex Ex; Future      Today we discussed at length all of the different treatment options including anti-inflammatories, acetaminophen, rest, ice, heat, physical therapy including strengthening and stretching exercises, home exercises, ROM, aerobic conditioning, aqua therapy, other modalities including ultrasound, massage, and dry needling, epidural steroid injections and finally surgical intervention.  Xrays and MRIs ordered. I will call with results. The patient will be scheduled in the clinic with pain management for cervical botox injections.

## 2023-05-02 ENCOUNTER — OFFICE VISIT (OUTPATIENT)
Dept: ORTHOPEDICS | Facility: CLINIC | Age: 68
End: 2023-05-02
Payer: OTHER GOVERNMENT

## 2023-05-02 VITALS — WEIGHT: 106.38 LBS | HEIGHT: 60 IN | BODY MASS INDEX: 20.88 KG/M2

## 2023-05-02 DIAGNOSIS — M54.6 PAIN IN THORACIC SPINE: ICD-10-CM

## 2023-05-02 DIAGNOSIS — M54.9 DORSALGIA, UNSPECIFIED: Primary | ICD-10-CM

## 2023-05-02 DIAGNOSIS — M51.36 DDD (DEGENERATIVE DISC DISEASE), LUMBAR: ICD-10-CM

## 2023-05-02 DIAGNOSIS — Z98.1 S/P CERVICAL SPINAL FUSION: ICD-10-CM

## 2023-05-02 PROCEDURE — 99999 PR PBB SHADOW E&M-EST. PATIENT-LVL V: CPT | Mod: PBBFAC,,, | Performed by: REGISTERED NURSE

## 2023-05-02 PROCEDURE — 99204 OFFICE O/P NEW MOD 45 MIN: CPT | Mod: S$PBB,,, | Performed by: REGISTERED NURSE

## 2023-05-02 PROCEDURE — 99215 OFFICE O/P EST HI 40 MIN: CPT | Mod: PBBFAC | Performed by: REGISTERED NURSE

## 2023-05-02 PROCEDURE — 99999 PR PBB SHADOW E&M-EST. PATIENT-LVL V: ICD-10-PCS | Mod: PBBFAC,,, | Performed by: REGISTERED NURSE

## 2023-05-02 PROCEDURE — 99204 PR OFFICE/OUTPT VISIT, NEW, LEVL IV, 45-59 MIN: ICD-10-PCS | Mod: S$PBB,,, | Performed by: REGISTERED NURSE

## 2023-05-02 RX ORDER — LIDOCAINE 50 MG/G
OINTMENT TOPICAL
COMMUNITY

## 2023-05-02 RX ORDER — OMEPRAZOLE 20 MG/1
20 TABLET, DELAYED RELEASE ORAL
COMMUNITY

## 2023-05-02 RX ORDER — ACETAMINOPHEN 325 MG/1
325 TABLET ORAL
COMMUNITY

## 2023-05-02 RX ORDER — HYDROXYZINE HYDROCHLORIDE 50 MG/1
50 TABLET, FILM COATED ORAL
COMMUNITY

## 2023-05-23 ENCOUNTER — PATIENT MESSAGE (OUTPATIENT)
Dept: RESEARCH | Facility: HOSPITAL | Age: 68
End: 2023-05-23
Payer: OTHER GOVERNMENT

## 2023-05-24 ENCOUNTER — HOSPITAL ENCOUNTER (OUTPATIENT)
Dept: RADIOLOGY | Facility: HOSPITAL | Age: 68
Discharge: HOME | End: 2023-05-24
Attending: REGISTERED NURSE
Payer: OTHER GOVERNMENT

## 2023-05-24 ENCOUNTER — TELEPHONE (OUTPATIENT)
Dept: ORTHOPEDICS | Facility: CLINIC | Age: 68
End: 2023-05-24
Payer: OTHER GOVERNMENT

## 2023-05-24 DIAGNOSIS — Z98.1 S/P CERVICAL SPINAL FUSION: ICD-10-CM

## 2023-05-24 DIAGNOSIS — M54.6 PAIN IN THORACIC SPINE: ICD-10-CM

## 2023-05-24 DIAGNOSIS — M51.36 DDD (DEGENERATIVE DISC DISEASE), LUMBAR: ICD-10-CM

## 2023-05-24 DIAGNOSIS — M54.9 DORSALGIA, UNSPECIFIED: ICD-10-CM

## 2023-05-24 PROCEDURE — 72110 X-RAY EXAM L-2 SPINE 4/>VWS: CPT | Mod: TC

## 2023-05-24 PROCEDURE — 72110 X-RAY EXAM L-2 SPINE 4/>VWS: CPT | Mod: 26,,, | Performed by: RADIOLOGY

## 2023-05-24 PROCEDURE — 72146 MRI CHEST SPINE W/O DYE: CPT | Mod: 26,,, | Performed by: RADIOLOGY

## 2023-05-24 PROCEDURE — 72070 X-RAY EXAM THORAC SPINE 2VWS: CPT | Mod: TC

## 2023-05-24 PROCEDURE — 72148 MRI LUMBAR SPINE W/O DYE: CPT | Mod: 26,,, | Performed by: RADIOLOGY

## 2023-05-24 PROCEDURE — 72070 X-RAY EXAM THORAC SPINE 2VWS: CPT | Mod: 26,,, | Performed by: RADIOLOGY

## 2023-05-24 PROCEDURE — 72070 XR THORACIC SPINE AP LATERAL: ICD-10-PCS | Mod: 26,,, | Performed by: RADIOLOGY

## 2023-05-24 PROCEDURE — 72050 X-RAY EXAM NECK SPINE 4/5VWS: CPT | Mod: 26,,, | Performed by: RADIOLOGY

## 2023-05-24 PROCEDURE — 72110 XR LUMBAR SPINE AP AND LAT WITH FLEX/EXT: ICD-10-PCS | Mod: 26,,, | Performed by: RADIOLOGY

## 2023-05-24 PROCEDURE — 72050 X-RAY EXAM NECK SPINE 4/5VWS: CPT | Mod: TC

## 2023-05-24 PROCEDURE — 72146 MRI THORACIC SPINE WITHOUT CONTRAST: ICD-10-PCS | Mod: 26,,, | Performed by: RADIOLOGY

## 2023-05-24 PROCEDURE — 72148 MRI LUMBAR SPINE W/O DYE: CPT | Mod: TC

## 2023-05-24 PROCEDURE — 72050 XR CERVICAL SPINE AP LAT WITH FLEX EXTEN: ICD-10-PCS | Mod: 26,,, | Performed by: RADIOLOGY

## 2023-05-24 PROCEDURE — 72146 MRI CHEST SPINE W/O DYE: CPT | Mod: TC

## 2023-05-24 PROCEDURE — 72148 MRI LUMBAR SPINE WITHOUT CONTRAST: ICD-10-PCS | Mod: 26,,, | Performed by: RADIOLOGY

## 2023-05-24 NOTE — TELEPHONE ENCOUNTER
Attempt to contact patient in regards to scheduling a follow up appointment for mri results to see Georgina Daniels. Left message for patient to return call back to [phone]. Thanks.

## 2023-06-05 ENCOUNTER — HOSPITAL ENCOUNTER (OUTPATIENT)
Dept: RADIOLOGY | Facility: HOSPITAL | Age: 68
Discharge: HOME | End: 2023-06-05
Payer: OTHER GOVERNMENT

## 2023-06-05 DIAGNOSIS — Z12.31 SCREENING MAMMOGRAM FOR HIGH-RISK PATIENT: ICD-10-CM

## 2023-06-05 PROCEDURE — 77067 SCR MAMMO BI INCL CAD: CPT | Mod: TC

## 2023-06-05 PROCEDURE — 77067 SCR MAMMO BI INCL CAD: CPT | Mod: 26,,, | Performed by: RADIOLOGY

## 2023-06-05 PROCEDURE — 77067 MAMMO DIGITAL SCREENING BILAT WITH TOMO: ICD-10-PCS | Mod: 26,,, | Performed by: RADIOLOGY

## 2023-06-05 PROCEDURE — 77063 BREAST TOMOSYNTHESIS BI: CPT | Mod: 26,,, | Performed by: RADIOLOGY

## 2023-06-05 PROCEDURE — 77063 MAMMO DIGITAL SCREENING BILAT WITH TOMO: ICD-10-PCS | Mod: 26,,, | Performed by: RADIOLOGY

## 2023-06-07 ENCOUNTER — TELEPHONE (OUTPATIENT)
Dept: ORTHOPEDICS | Facility: CLINIC | Age: 68
End: 2023-06-07
Payer: OTHER GOVERNMENT

## 2023-06-08 NOTE — PROGRESS NOTES
Established Patient - Audio Only Telehealth Visit     The patient location is: home  The chief complaint leading to consultation is: MRI results  Visit type: Virtual visit with audio only (telephone)  Total time spent with patient: 15min     The reason for the audio only service rather than synchronous audio and video virtual visit was related to technical difficulties or patient preference/necessity.     Each patient to whom I provide medical services by telemedicine is:  (1) informed of the relationship between the physician and patient and the respective role of any other health care provider with respect to management of the patient; and (2) notified that they may decline to receive medical services by telemedicine and may withdraw from such care at any time. Patient verbally consented to receive this service via voice-only telephone call.    DATE: 6/12/2023  PATIENT: Saira Osuna    Attending Physician: Cyrus Mon M.D.    HISTORY:  Saira Osuna is a 68 y.o. female who returns to me today for MRI results.  She was last seen by me 5/2/2023.  Today she is doing well but notes continued low back pain.    The Patient denies myelopathic symptoms such as handwriting changes or difficulty with buttons/coins/keys. Denies perineal paresthesias, bowel/bladder dysfunction.    PMH/PSH/FamHx/SocHx:  Unchanged from prior visit    ROS:  REVIEW OF SYSTEMS:  Constitution: Negative. Negative for chills, fever and night sweats.   HENT: Negative for congestion and headaches.    Eyes: Negative for blurred vision, left vision loss and right vision loss.   Cardiovascular: Negative for chest pain and syncope.   Respiratory: Negative for cough and shortness of breath.    Endocrine: Negative for polydipsia, polyphagia and polyuria.   Hematologic/Lymphatic: Negative for bleeding problem. Does not bruise/bleed easily.   Skin: Negative for dry skin, itching and rash.   Musculoskeletal: Negative for falls and muscle weakness.    Gastrointestinal: Negative for abdominal pain and bowel incontinence.   Allergic/Immunologic: Negative for hives and persistent infections.  Genitourinary: Negative for urinary retention/incontinence and nocturia.   Neurological: negative for disturbances in coordination, no myelopathic symptoms such as handwriting changes or difficulty with buttons, coins, keys or small objects. No loss of balance and seizures.   Psychiatric/Behavioral: Negative for depression. The patient does not have insomnia.   Denies myelopathic symptoms, perineal paresthesias, bowel or bladder incontinence    EXAM:  There were no vitals taken for this visit.  Stable.     IMAGING:    Today I personally re-reviewed AP, Lat and Flex/Ex  upright C-spine films that demonstrate hardware in place without failure.    T-spine shows mild DDD throughout with scoliosis. Age indeterminate mild biconcave compression abnormality T9 vertebral segment.    L-spine shows severe DDD.  Grade 1 retrolisthesis L1 with respect L2.  Grade 1 retrolisthesis L3 with respect L4.  Mild grade 1 anterolisthesis L4 with respect L5.     Lumbar and thoracic MRI shows mild spinal canal stenosis at T10-L1 and L3-L5.  Moderate neural foraminal narrowing noted at L2-S1. Chronic compression fracture of the L1 vertebral body with moderate height loss.  Hepatomegaly with intrahepatic biliary dilatation and several indeterminate T2 hyperintense lesions.  Recommend further evaluation with contrast enhanced MRI of the abdomen with MRCP.    There is no height or weight on file to calculate BMI.    No results found for: HGBA1C      ASSESSMENT/PLAN:    Diagnoses and all orders for this visit:    Liver lesion  -     Ambulatory referral/consult to Hematology / Oncology; Future    Chronic bilateral low back pain without sciatica      The patient would [facility] found [facility] her [facility]. I have placed a referral to hem/onc for further work up  as well.  She is scheduled in the clinic with Dr. Navas for botox injections in her neck and low back pain.      This service was not originating from a related E/M service provided within the previous 7 days nor will  to an E/M service or procedure within the next 24 hours or my soonest available appointment.  Prevailing standard of care was able to be met in this audio-only visit.

## 2023-06-10 ENCOUNTER — HOSPITAL ENCOUNTER (EMERGENCY)
Facility: HOSPITAL | Age: 68
Discharge: HOME | End: 2023-06-10
Attending: EMERGENCY MEDICINE
Payer: OTHER GOVERNMENT

## 2023-06-10 VITALS
TEMPERATURE: 98 F | HEART RATE: 88 BPM | RESPIRATION RATE: 18 BRPM | DIASTOLIC BLOOD PRESSURE: 64 MMHG | HEIGHT: 60 IN | BODY MASS INDEX: 20.62 KG/M2 | SYSTOLIC BLOOD PRESSURE: 120 MMHG | WEIGHT: 105 LBS | OXYGEN SATURATION: 98 %

## 2023-06-10 DIAGNOSIS — T63.481A INSECT STINGS, ACCIDENTAL OR UNINTENTIONAL, INITIAL ENCOUNTER: Primary | ICD-10-CM

## 2023-06-10 PROCEDURE — 99283 EMERGENCY DEPT VISIT LOW MDM: CPT

## 2023-06-10 PROCEDURE — 25000003 PHARM REV CODE 250: Performed by: EMERGENCY MEDICINE

## 2023-06-10 RX ORDER — IBUPROFEN 600 MG/1
600 TABLET ORAL
Status: COMPLETED | OUTPATIENT
Start: 2023-06-10 | End: 2023-06-10

## 2023-06-10 RX ORDER — DIPHENHYDRAMINE HCL 25 MG
25 CAPSULE ORAL
Status: COMPLETED | OUTPATIENT
Start: 2023-06-10 | End: 2023-06-10

## 2023-06-10 RX ADMIN — DIPHENHYDRAMINE HYDROCHLORIDE 25 MG: 25 CAPSULE ORAL at 01:06

## 2023-06-10 RX ADMIN — IBUPROFEN 600 MG: 600 TABLET ORAL at 01:06

## 2023-06-10 NOTE — ED PROVIDER NOTES
"Encounter Date: 6/10/2023    SCRIBE #1 NOTE: IShanique, am scribing for, and in the presence of,  Graham Pena MD.     History     Chief Complaint   Patient presents with    Insect Bite     To right toe approximately 15 minutes PTA, pain and itching at site     Time seen by provider: 1:36 PM on 06/10/2023    Saira Osuna is a 68 y.o. female with no pertinent PMHx or PSHx who presents to the ED for evaluation of an insect bite that occurred about 15 minutes ago.  Patient reports being in her garden while wearing "flip flops," when she believes a spider bit the top of her R great toe.  Since the incident, the patient notes the pain is "spreading" up her distal RLE, prompting her current visit.  The patient did not take any OTC medications for management of Sx.  She confirms associated itching and pain to the region.  The patient denies any swelling, or any other symptoms at this time.  She reports no Hx of liver issues.      The history is provided by the patient.   Review of patient's allergies indicates:   Allergen Reactions    Penicillins Hives     Past Medical History:   Diagnosis Date    Constipation     Dependence on nocturnal oxygen therapy     2 liters/ nasal cannula    Depression     Emphysema lung     Encounter for blood transfusion     GERD (gastroesophageal reflux disease)     Migraines     Neck pain      Past Surgical History:   Procedure Laterality Date    AUGMENTATION OF BREAST      CERVICAL FUSION      COLONOSCOPY      COLONOSCOPY N/A 6/2/2022    Procedure: COLONOSCOPY;  Surgeon: Joni Burnett MD;  Location: [facility];  Service: Endoscopy;  Laterality: N/A;    COLONOSCOPY N/A 9/30/2022    Procedure: COLONOSCOPY;  Surgeon: Joni Burnett MD;  Location: [facility];  Service: Endoscopy;  Laterality: N/A;    ENDOSCOPIC RELEASE OF BOTH CARPAL TUNNELS      2 carpal surgeries on right, one on left    ESOPHAGEAL DILATION      ESOPHAGOGASTRODUODENOSCOPY N/A 02/08/2022    Procedure: EGD " (ESOPHAGOGASTRODUODENOSCOPY);  Surgeon: Joni Burnett MD;  Location: [facility];  Service: Endoscopy;  Laterality: N/A;    INTERPOSITION ARTHROPLASTY OF CARPOMETACARPAL JOINTS Right     thumb    TONSILLECTOMY      TOTAL HIP ARTHROPLASTY Bilateral     UPPER GASTROINTESTINAL ENDOSCOPY       Family History   Problem Relation Age of Onset    Breast cancer Maternal Aunt     Colon cancer Neg Hx     Colon polyps Neg Hx     Crohn's disease Neg Hx     Ulcerative colitis Neg Hx     Stomach cancer Neg Hx     Rectal cancer Neg Hx      Social History     Tobacco Use    Smoking status: Former     Types: Cigarettes     Quit date: 1990     Years since quittin.4    Smokeless tobacco: Never   Substance Use Topics    Alcohol use: Yes     Comment: rarely    Drug use: Never     Review of Systems   HENT: Negative.     Eyes: Negative.    Respiratory: Negative.     Cardiovascular:  Negative for leg swelling.   Musculoskeletal:  Positive for arthralgias (R toe). Negative for joint swelling.   Skin:  Positive for wound (insect bite).   All other systems reviewed and are negative.    Physical Exam     Initial Vitals [06/10/23 1325]   BP Pulse Resp Temp SpO2   120/64 88 18 98.3 °F (36.8 °C) 98 %      MAP       --         Physical Exam    Nursing note and vitals reviewed.  Constitutional: She appears well-developed and well-nourished.   HENT:   Head: Normocephalic and atraumatic.   Eyes: EOM are normal.   Neck: Neck supple.   Normal range of motion.  Cardiovascular:  Normal rate, regular rhythm and normal heart sounds.     Exam reveals no gallop and no friction rub.       No murmur heard.  Pulmonary/Chest: Breath sounds normal. No respiratory distress. She has no wheezes. She has no rhonchi. She has no rales.   Musculoskeletal:         General: Normal range of motion.      Cervical back: Normal range of motion and neck supple.     Neurological: She is alert and oriented to person, place, and time.   Skin: Skin is warm and dry.  There is erythema (mild over the MTP of the R first toe).   Psychiatric: She has a normal mood and affect. Her behavior is normal. Judgment and thought content normal.       ED Course   Procedures  Labs Reviewed   HIV 1 / 2 ANTIBODY   HEPATITIS C ANTIBODY          Imaging Results    None          Medications   diphenhydrAMINE capsule 25 mg (25 mg Oral Given 6/10/23 1343)   ibuprofen tablet 600 mg (600 mg Oral Given 6/10/23 1343)     Medical Decision Making:   History:   Old Medical Records: I decided to obtain old medical records.  Initial Assessment:   Patient presents with chief complaint of an insect sting to the left great toe  Differential Diagnosis:   Foreign body, insect sting, anaphylaxis  ED Management:  Motrin, Benadryl, ice  68-year-old presents to the ER after she was stung by an unknown insect on the left great toe.  At this location she has some very minimal erythema.  She reports some itching but she denies shortness of breath or wheezing or trouble swallowing or facial swelling.  Reaction appears to be limited to a local envenomation.  I advised her to treat her symptoms with Motrin and Tylenol for pain, ice for swelling and Benadryl as needed for itching.  She has absolutely no symptoms consistent with anaphylaxis at this time and can be discharged home.        Scribe Attestation:   Scribe #1: I performed the above scribed service and the documentation accurately describes the services I performed. I attest to the accuracy of the note.      ED Course as of 06/10/23 1447   Sat Al 10, 2023   1329 SpO2: 98 % [EF]   1329 Resp: 18 [EF]   1329 Pulse: 88 [EF]   1329 Temp Source: Oral [EF]   1329 Temp: 98.3 °F (36.8 °C) [EF]   1329 BP: 120/64 [EF]      ED Course User Index  [EF] Graham Pena MD               I, Dr. Pena, personally performed the services described in this documentation. All medical record entries made by the scribe were at my direction and in my presence.  I have reviewed the chart  and agree that the record reflects my personal performance and is accurate and complete.2:47 PM 06/10/2023      Clinical Impression:   Final diagnoses:  [T63.481A] Insect stings, accidental or unintentional, initial encounter (Primary)        ED Disposition Condition    Discharge Stable          ED Prescriptions    None       Follow-up Information       Follow up With Specialties Details Why Contact Info    [facility] Emergency Dept Emergency Medicine  As needed, If symptoms worsen [address] [place] [id number] [phone]             Graham Pena MD  06/10/23 0596

## 2023-06-10 NOTE — ED NOTES
C/o R foot pain on top of great toe that radiates to ankle after being bit by something that she thinks was a spider. Mild redness noted.

## 2023-06-12 ENCOUNTER — OFFICE VISIT (OUTPATIENT)
Dept: ORTHOPEDICS | Facility: CLINIC | Age: 68
End: 2023-06-12
Payer: OTHER GOVERNMENT

## 2023-06-12 DIAGNOSIS — G89.29 CHRONIC BILATERAL LOW BACK PAIN WITHOUT SCIATICA: ICD-10-CM

## 2023-06-12 DIAGNOSIS — K76.9 LIVER LESION: Primary | ICD-10-CM

## 2023-06-12 DIAGNOSIS — M54.50 CHRONIC BILATERAL LOW BACK PAIN WITHOUT SCIATICA: ICD-10-CM

## 2023-06-12 PROCEDURE — 99213 OFFICE O/P EST LOW 20 MIN: CPT | Mod: 95,,, | Performed by: REGISTERED NURSE

## 2023-06-12 PROCEDURE — 99213 PR OFFICE/OUTPT VISIT, EST, LEVL III, 20-29 MIN: ICD-10-PCS | Mod: 95,,, | Performed by: REGISTERED NURSE

## 2023-06-13 ENCOUNTER — TELEPHONE (OUTPATIENT)
Dept: HEMATOLOGY/ONCOLOGY | Facility: CLINIC | Age: 68
End: 2023-06-13
Payer: OTHER GOVERNMENT

## 2023-06-13 DIAGNOSIS — K76.9 LIVER LESION: Primary | ICD-10-CM

## 2023-06-13 NOTE — TELEPHONE ENCOUNTER
----- Message from Ro Edwards NP sent at 6/13/2023 11:18 AM CDT -----  Referral placed for undiagnosed.    Thanks  ----- Message -----  From: Lima Maki RN  Sent: 6/13/2023  11:07 AM CDT  To: [facility]    Good morning,    Please review for approval of consult with dx clinic.     Thank you  Lima  ----- Message -----  From: Eva Donnelly RN  Sent: 6/12/2023   4:15 PM CDT  To: Olga Lidia Ness, Lima Maki, BRIAN Amato,  Please send to your undiagnosed clinic for review.    This is a liver lesion without work up found on MRI of spine.    Mariluz

## 2023-06-15 ENCOUNTER — TELEPHONE (OUTPATIENT)
Dept: HEMATOLOGY/ONCOLOGY | Facility: CLINIC | Age: 68
End: 2023-06-15
Payer: OTHER GOVERNMENT

## 2023-06-15 NOTE — TELEPHONE ENCOUNTER
Message sent to ORIN Daniels NP      Good afternoon,    Patient case was reviewed and would best be serviced in [facility] cancer diagnosis clinic. The patient's insurance is [employer], which would require the [facility] to [facility] the referral to the Cancer Diagnosis clinic with Dr Casey. Once authorization/referral is obtained, please fax copy to [phone].    Thank you,  Lima TORRES Onc Navigator

## 2023-06-16 ENCOUNTER — TELEPHONE (OUTPATIENT)
Dept: HEMATOLOGY/ONCOLOGY | Facility: CLINIC | Age: 68
End: 2023-06-16
Payer: OTHER GOVERNMENT

## 2023-07-10 ENCOUNTER — TELEPHONE (OUTPATIENT)
Dept: PAIN MEDICINE | Facility: CLINIC | Age: 68
End: 2023-07-10
Payer: OTHER GOVERNMENT

## 2023-07-10 NOTE — TELEPHONE ENCOUNTER
----- Message from Wesley Canales sent at 7/10/2023  1:20 PM CDT -----  Contact: Patient  Type:  Patient Call          Who Called:Patient        Does the patient know what this is regarding?: requesting a call back to discuss will she be having her injection on her visit or is it just for a consult ; please advise           Would the patient rather a call back or a response via [facility]?call           Best Call Back Number:[phone] ([place])              Additional Information:

## 2023-07-11 ENCOUNTER — OFFICE VISIT (OUTPATIENT)
Dept: PAIN MEDICINE | Facility: CLINIC | Age: 68
End: 2023-07-11
Attending: ANESTHESIOLOGY
Payer: OTHER GOVERNMENT

## 2023-07-11 VITALS
BODY MASS INDEX: 20.77 KG/M2 | WEIGHT: 105.81 LBS | HEART RATE: 65 BPM | RESPIRATION RATE: 19 BRPM | HEIGHT: 60 IN | SYSTOLIC BLOOD PRESSURE: 103 MMHG | DIASTOLIC BLOOD PRESSURE: 69 MMHG | TEMPERATURE: 97 F

## 2023-07-11 DIAGNOSIS — Z98.890 CHRONIC NECK PAIN WITH HISTORY OF CERVICAL SPINAL SURGERY: ICD-10-CM

## 2023-07-11 DIAGNOSIS — G89.28 CHRONIC NECK PAIN WITH HISTORY OF CERVICAL SPINAL SURGERY: ICD-10-CM

## 2023-07-11 DIAGNOSIS — M54.81 BILATERAL OCCIPITAL NEURALGIA: Primary | ICD-10-CM

## 2023-07-11 DIAGNOSIS — M54.2 CHRONIC NECK PAIN WITH HISTORY OF CERVICAL SPINAL SURGERY: ICD-10-CM

## 2023-07-11 DIAGNOSIS — M47.812 CERVICAL SPONDYLOSIS: ICD-10-CM

## 2023-07-11 PROCEDURE — 99204 PR OFFICE/OUTPT VISIT, NEW, LEVL IV, 45-59 MIN: ICD-10-PCS | Mod: S$PBB,,, | Performed by: ANESTHESIOLOGY

## 2023-07-11 PROCEDURE — 99215 OFFICE O/P EST HI 40 MIN: CPT | Mod: PBBFAC | Performed by: ANESTHESIOLOGY

## 2023-07-11 PROCEDURE — 99204 OFFICE O/P NEW MOD 45 MIN: CPT | Mod: S$PBB,,, | Performed by: ANESTHESIOLOGY

## 2023-07-11 PROCEDURE — 99999 PR PBB SHADOW E&M-EST. PATIENT-LVL V: CPT | Mod: PBBFAC,,, | Performed by: ANESTHESIOLOGY

## 2023-07-11 PROCEDURE — 99999 PR PBB SHADOW E&M-EST. PATIENT-LVL V: ICD-10-PCS | Mod: PBBFAC,,, | Performed by: ANESTHESIOLOGY

## 2023-07-11 RX ORDER — BACLOFEN 5 MG/1
5 TABLET ORAL 3 TIMES DAILY
Qty: 90 TABLET | Refills: 2 | Status: SHIPPED | OUTPATIENT
Start: 2023-07-11 | End: 2023-10-09

## 2023-07-11 NOTE — PROGRESS NOTES
"Subjective:      Patient ID: Saira Osuna is a 68 y.o. female.    Chief Complaint: No chief complaint on file.    Referred by: ORIN Daniels NP     Saira Osuna is a 67 y/o female with history of chronic neck and back pain s/p ACDF of C2-C4 and PCF of C1-T2 in may of 2021 in [place], who presents as a new consult from Georgina Daniels NP of orthopedics. Patient describes her pain as a constant allodynia/hyperesthesia originating from the occiput of skull extending to the base of the skull. She endorses tension headaches secondary to "stiffness in my neck" that occurs several times weekly lasting several hours. These headaches are alleviated with prolonged stretching. Pain is worsened with flexion and extension of the neck. Pain is alleviated with massage, physical therapy (most recent 2021). She has tried gabapentin without significant relief. Celebrex with mild relief. Denies focal weakness of the upper extremities. Endorses some mild subjective global weakness, but relates this to severe arthritic pain in hands. Denies bowel/bladder incontinence or saddle anesthesia, weight loss, fevers or chills.          Interventional Pain History  None since cervical fusion    Past Medical History:   Diagnosis Date    Constipation     Dependence on nocturnal oxygen therapy     2 liters/ nasal cannula    Depression     Emphysema lung     Encounter for blood transfusion     GERD (gastroesophageal reflux disease)     Migraines     Neck pain        Past Surgical History:   Procedure Laterality Date    AUGMENTATION OF BREAST      CERVICAL FUSION      COLONOSCOPY      COLONOSCOPY N/A 6/2/2022    Procedure: COLONOSCOPY;  Surgeon: Joni Burnett MD;  Location: [facility];  Service: Endoscopy;  Laterality: N/A;    COLONOSCOPY N/A 9/30/2022    Procedure: COLONOSCOPY;  Surgeon: Joni Burnett MD;  Location: [facility];  Service: Endoscopy;  Laterality: N/A;    ENDOSCOPIC RELEASE OF BOTH CARPAL TUNNELS      2 carpal surgeries on right, " one on left    ESOPHAGEAL DILATION      ESOPHAGOGASTRODUODENOSCOPY N/A 02/08/2022    Procedure: EGD (ESOPHAGOGASTRODUODENOSCOPY);  Surgeon: Joni Burnett MD;  Location: [facility];  Service: Endoscopy;  Laterality: N/A;    INTERPOSITION ARTHROPLASTY OF CARPOMETACARPAL JOINTS Right     thumb    TONSILLECTOMY      TOTAL HIP ARTHROPLASTY Bilateral     UPPER GASTROINTESTINAL ENDOSCOPY         Review of patient's allergies indicates:   Allergen Reactions    Penicillins Hives       Current Outpatient Medications   Medication Sig Dispense Refill    acetaminophen (TYLENOL) 325 MG tablet Take 325 mg by mouth.      aspirin (ECOTRIN) 81 MG EC tablet Take 81 mg by mouth once daily.      bisacodyL (DULCOLAX) 5 mg EC tablet Dulcolax (bisacodyl) 5 mg tablet,delayed release   take 2 (5mg) tablets as needed if no bowel movement for 2-3 days      buPROPion (WELLBUTRIN XL) 150 MG TB24 tablet TAKE THREE TABLETS BY MOUTH EVERY DAY FOR DEPRESSION      calcium carbonate-vitamin D3 250-125 mg 250 mg-3.125 mcg (125 unit) Tab TAKE 2 TABLETS BY MOUTH EVERY DAY AS A MINERAL SUPPLEMENT      cyanocobalamin (VITAMIN B-12) 1000 MCG tablet TAKE ONE TABLET BY MOUTH EVERY DAY FOR VITAMIN DEFICIENCIES      ferrous sulfate (FEOSOL) 325 mg (65 mg iron) Tab tablet Iron (ferrous sulfate) 325 mg (65 mg iron) tablet   Take 1 tablet every other day by oral route.      hydrOXYzine (ATARAX) 50 MG tablet TAKE ONE TABLET BY MOUTH THREE TIMES A DAY AS NEEDED FOR ALLERGIES/ITCHING ANXIETY      hydrOXYzine (ATARAX) 50 MG tablet Take 50 mg by mouth.      LIDOcaine (XYLOCAINE) 5 % Oint ointment Apply topically.      methocarbamoL (ROBAXIN) 500 MG Tab TAKE ONE TABLET BY MOUTH THREE TIMES A DAY IF NEEDED (MUSCLE RELAXANT) (START WITH 1/2 TABLET AT BEDTIME THEN ADD IN MORNING DOSE AND IF  TOLERATED THEN TAKE 1/2 TABLET THREE TIMES A DAY FOR A FEW DAYS AND THEN  CAN INCREASE TO A FULL TABLET IF NO SIDE EFFECTS) (MUSCLE RELAXANT)  (START WITH 1/2 TABLET AT BEDTIME THEN  ADD IN MORNING DOSE AND IF   TOLERATED THEN TAKE 1/2 TABLET THREE TIMES A DAY FOR A FEW DAYS AND THEN   CAN INCREASE TO A FULL TABLET IF NO SIDE EFFECTS)      omeprazole (PRILOSEC OTC) 20 MG tablet Take 20 mg by mouth.      omeprazole (PRILOSEC) 40 MG capsule omeprazole 40 mg capsule,delayed release   Take 1 capsule every day by oral route.      polyethylene glycol (GLYCOLAX) 17 gram/dose powder Miralax 17 gram/dose oral powder   1 capful daily at bedtime      sumatriptan (IMITREX) 100 MG tablet Take 100 mg by mouth every 2 (two) hours as needed for Migraine.      trazodone HCl (TRAZODONE ORAL) trazodone   100 mg  at night       No current facility-administered medications for this visit.       Family History   Problem Relation Age of Onset    Breast cancer Maternal Aunt     Colon cancer Neg Hx     Colon polyps Neg Hx     Crohn's disease Neg Hx     Ulcerative colitis Neg Hx     Stomach cancer Neg Hx     Rectal cancer Neg Hx        Social History     Socioeconomic History    Marital status: Single   Tobacco Use    Smoking status: Former     Types: Cigarettes     Quit date: 1990     Years since quittin.4    Smokeless tobacco: Never   Substance and Sexual Activity    Alcohol use: Yes     Comment: rarely    Drug use: Never           Review of Systems   Constitutional: Negative for chills, fever and weight loss.   Cardiovascular:  Negative for chest pain.   Respiratory:  Negative for shortness of breath.    Musculoskeletal:  Positive for arthritis, myalgias and neck pain.   Gastrointestinal:  Negative for abdominal pain and bowel incontinence.   Genitourinary:  Negative for bladder incontinence.   Neurological:  Positive for sensory change. Negative for focal weakness.         Objective:   /69 (BP Location: Right arm, Patient Position: Sitting)   Pulse 65   Temp 97.4 °F (36.3 °C) (Oral)   Resp 19   Ht 5' (1.524 m)   Wt 48 kg (105 lb 13.1 oz)   BMI 20.67 kg/m²   Pain Disability Index Review:  Last 3  PDI Scores 7/11/2023   Pain Disability Index (PDI) 30     Normocephalic.  Atraumatic.  Affect appropriate.  Breathing unlabored.  Extra ocular muscles intact.           General    Constitutional: She is oriented to person, place, and time. She appears well-developed and well-nourished.   Eyes: EOM are normal. Pupils are equal, round, and reactive to light.   Cardiovascular:  Normal rate.            Pulmonary/Chest: Effort normal.   Neurological: She is alert and oriented to person, place, and time.   Psychiatric: She has a normal mood and affect.     General Musculoskeletal Exam   Gait: normal     Back (L-Spine & T-Spine) / Neck (C-Spine) Exam     Tenderness Right paramedian tenderness of the Occ. Left paramedian tenderness of the Occ.     Neck (C-Spine) Range of Motion   Flexion:      Severely limited  Extension:  Severely limited  Right Lateral Bend: abnormal  Left Lateral Bend: abnormal  Right Rotation: abnormal  Left Rotation: abnormal    Spinal Sensation   Right Side Sensation  C-Spine Level: hypersensitive   L-Spine Level: normal  Left Side Sensation  C-Spine Level: hypersensitive  L-Spine Level: normal    Comments:  There is increased sensitivity/pain with light touch over the posterior scalp       Muscle Strength   Right Upper Extremity   Biceps: 5/5   Deltoid:  5/5  Triceps:  5/5  Wrist extension: 5/5   Wrist flexion: 5/5   Finger Flexors:  5/5  Finger Extensors:  5/5  Left Upper Extremity  Biceps: 5/5   Deltoid:  5/5  Triceps:  5/5  Wrist extension: 5/5   Wrist flexion: 5/5   Finger Flexors:  5/5  Finger Extensors:  5/5    Reflexes     Left Side  Biceps:  1+  Triceps:  1+  Left Posadas's Sign:  Absent    Right Side   Biceps:  1+  Triceps:  1+  Right Posadas's Sign:  absent      Assessment:       Encounter Diagnoses   Name Primary?    Cervical spondylosis Yes    Chronic neck pain with history of cervical spinal surgery     Bilateral occipital neuralgia          Plan:   We discussed with the patient the  assessment and recommendations. The following is the plan we agreed on:        Diagnoses and all orders for this visit:    Cervical spondylosis    Chronic neck pain with history of cervical spinal surgery    Bilateral occipital neuralgia       Baclofen 5mg TID  Consider b/l Occipital N. Block at future visit   RTC in 1 month     Inder Martinez M.D.  [facility] PGY-2  This encounter took at least 45 minutes spent in chart review, history, physical, image, assessment and plan discussion.       I, Alex Navas MD independently interpretated the C spine xray which shows fusion from C1-T2 no fractures seen.   I have personally taken the history and examined this patient and agree with the resident's note as stated above.

## 2023-07-21 ENCOUNTER — TELEPHONE (OUTPATIENT)
Dept: PAIN MEDICINE | Facility: CLINIC | Age: 68
End: 2023-07-21
Payer: OTHER GOVERNMENT

## 2023-07-21 DIAGNOSIS — M54.81 BILATERAL OCCIPITAL NEURALGIA: Primary | ICD-10-CM

## 2023-08-17 ENCOUNTER — OFFICE VISIT (OUTPATIENT)
Dept: OPTOMETRY | Facility: CLINIC | Age: 68
End: 2023-08-17
Payer: OTHER GOVERNMENT

## 2023-08-17 DIAGNOSIS — H50.05 ALTERNATING ESOTROPIA: ICD-10-CM

## 2023-08-17 DIAGNOSIS — H25.13 NUCLEAR SCLEROSIS, BILATERAL: ICD-10-CM

## 2023-08-17 DIAGNOSIS — H53.2 DIPLOPIA: Primary | ICD-10-CM

## 2023-08-17 DIAGNOSIS — H04.123 DRY EYE SYNDROME, BILATERAL: ICD-10-CM

## 2023-08-17 DIAGNOSIS — H52.7 REFRACTIVE ERROR: ICD-10-CM

## 2023-08-17 PROCEDURE — 92015 DETERMINE REFRACTIVE STATE: CPT | Mod: ,,, | Performed by: OPTOMETRIST

## 2023-08-17 PROCEDURE — 99204 OFFICE O/P NEW MOD 45 MIN: CPT | Mod: S$PBB,,, | Performed by: OPTOMETRIST

## 2023-08-17 PROCEDURE — 92015 PR REFRACTION: ICD-10-PCS | Mod: ,,, | Performed by: OPTOMETRIST

## 2023-08-17 PROCEDURE — 99999 PR PBB SHADOW E&M-EST. PATIENT-LVL III: CPT | Mod: PBBFAC,,, | Performed by: OPTOMETRIST

## 2023-08-17 PROCEDURE — 99204 PR OFFICE/OUTPT VISIT, NEW, LEVL IV, 45-59 MIN: ICD-10-PCS | Mod: S$PBB,,, | Performed by: OPTOMETRIST

## 2023-08-17 PROCEDURE — 99213 OFFICE O/P EST LOW 20 MIN: CPT | Mod: PBBFAC,PO | Performed by: OPTOMETRIST

## 2023-08-17 PROCEDURE — 99999 PR PBB SHADOW E&M-EST. PATIENT-LVL III: ICD-10-PCS | Mod: PBBFAC,,, | Performed by: OPTOMETRIST

## 2023-08-17 NOTE — PROGRESS NOTES
HPI    Pt states having trouble w doubled vision x 1 year, getting worse.   [facility] ~[facility] days ago with prism but feels having trouble w   distance and a little close up.     Hx of lasik OU  +refresh PRN  +migraines  Denies eye pain, floaters/flashes.   Last edited by Noah Bailey, OD on 8/17/2023  3:59 PM.            Assessment /Plan     For exam results, see Encounter Report.    Diplopia    Alternating esotropia    Nuclear sclerosis, bilateral    Refractive error    Dry eye syndrome, bilateral      1. Diplopia  2. Alternating esotropia  Resolved with prisms  Progressing x 1 year  [facility] with [facility] -- previous thyroid levels normal, recommend MRI prn    3. Nuclear sclerosis, bilateral  Mild, not visually significant  Discussed possible ocular affects of cataracts. Acceptable BCVA OU. Discussed treatment options. Surgery not recommended at this time. Monitor yearly.     4. Refractive error  Dispensed updated spectacle Rx. Discussed various spectacle lens options. Discussed adaptation period to new specs.   Demonstrated new spec Rx vs current specs in phoropter with patient satisfaction    5. Dry eye syndrome, bilateral  Discussed ocular affects of dry eyes. Recommend OTC artificial tears 2-4 times a day in both eyes. Discussed chronicity of MERVAT. RTC if symptoms not alleviated by continued use of artificial tears.

## 2023-08-29 ENCOUNTER — PATIENT MESSAGE (OUTPATIENT)
Dept: ORTHOPEDICS | Facility: CLINIC | Age: 68
End: 2023-08-29
Payer: OTHER GOVERNMENT

## 2023-08-29 DIAGNOSIS — M79.641 RIGHT HAND PAIN: Primary | ICD-10-CM

## 2023-08-31 ENCOUNTER — HOSPITAL ENCOUNTER (OUTPATIENT)
Dept: RADIOLOGY | Facility: HOSPITAL | Age: 68
Discharge: HOME | End: 2023-08-31
Attending: ORTHOPAEDIC SURGERY
Payer: OTHER GOVERNMENT

## 2023-08-31 ENCOUNTER — OFFICE VISIT (OUTPATIENT)
Dept: ORTHOPEDICS | Facility: CLINIC | Age: 68
End: 2023-08-31
Payer: OTHER GOVERNMENT

## 2023-08-31 VITALS — HEIGHT: 60 IN | BODY MASS INDEX: 20.62 KG/M2 | WEIGHT: 105 LBS

## 2023-08-31 DIAGNOSIS — M79.641 RIGHT HAND PAIN: ICD-10-CM

## 2023-08-31 DIAGNOSIS — M18.11 ARTHRITIS OF CARPOMETACARPAL (CMC) JOINT OF RIGHT THUMB: Primary | ICD-10-CM

## 2023-08-31 PROCEDURE — 99999PBSHW PR PBB SHADOW TECHNICAL ONLY FILED TO HB: Mod: PBBFAC,,,

## 2023-08-31 PROCEDURE — 99999 PR PBB SHADOW E&M-EST. PATIENT-LVL III: ICD-10-PCS | Mod: PBBFAC,,, | Performed by: ORTHOPAEDIC SURGERY

## 2023-08-31 PROCEDURE — 73130 XR HAND COMPLETE 3 VIEW RIGHT: ICD-10-PCS | Mod: 26,RT,, | Performed by: RADIOLOGY

## 2023-08-31 PROCEDURE — 73130 X-RAY EXAM OF HAND: CPT | Mod: TC,PO,RT

## 2023-08-31 PROCEDURE — 99203 OFFICE O/P NEW LOW 30 MIN: CPT | Mod: S$PBB,25,, | Performed by: ORTHOPAEDIC SURGERY

## 2023-08-31 PROCEDURE — 73130 X-RAY EXAM OF HAND: CPT | Mod: 26,RT,, | Performed by: RADIOLOGY

## 2023-08-31 PROCEDURE — 20600 DRAIN/INJ JOINT/BURSA W/O US: CPT | Mod: PBBFAC,PO,RT | Performed by: ORTHOPAEDIC SURGERY

## 2023-08-31 PROCEDURE — 99999PBSHW PR PBB SHADOW TECHNICAL ONLY FILED TO HB: ICD-10-PCS | Mod: PBBFAC,,,

## 2023-08-31 PROCEDURE — 99999 PR PBB SHADOW E&M-EST. PATIENT-LVL III: CPT | Mod: PBBFAC,,, | Performed by: ORTHOPAEDIC SURGERY

## 2023-08-31 PROCEDURE — 99203 PR OFFICE/OUTPT VISIT, NEW, LEVL III, 30-44 MIN: ICD-10-PCS | Mod: S$PBB,25,, | Performed by: ORTHOPAEDIC SURGERY

## 2023-08-31 PROCEDURE — 20600 SMALL JOINT ASPIRATION/INJECTION: R THUMB CMC: ICD-10-PCS | Mod: S$PBB,RT,, | Performed by: ORTHOPAEDIC SURGERY

## 2023-08-31 PROCEDURE — 99213 OFFICE O/P EST LOW 20 MIN: CPT | Mod: PBBFAC,PO,25 | Performed by: ORTHOPAEDIC SURGERY

## 2023-08-31 RX ORDER — TRIAMCINOLONE ACETONIDE 40 MG/ML
40 INJECTION, SUSPENSION INTRA-ARTICULAR; INTRAMUSCULAR
Status: DISCONTINUED | OUTPATIENT
Start: 2023-08-31 | End: 2023-08-31 | Stop reason: HOSPADM

## 2023-08-31 RX ADMIN — TRIAMCINOLONE ACETONIDE 40 MG: 40 INJECTION, SUSPENSION INTRA-ARTICULAR; INTRAMUSCULAR at 10:08

## 2023-08-31 NOTE — PROGRESS NOTES
[facility]  History & Physical  Orthopedics    SUBJECTIVE:      COVID-19 attestation:  This patient was treated during the COVID-19 pandemic.  This was discussed with the patient, they are aware of our current policies and procedures, were given the option of delaying their visit and or switching to a virtual visit, delaying their surgery when applicable, and they elect to proceed.    Chief Complaint:  Right thumb pain, right hand numbness and tingling    Referring Provider: Mode Dunlap     History of Present Illness:  Patient is a 68 y.o. right hand dominant female who presents today with complaints of numbness and tingling in the right hand as well as some right thumb pain.  The patient has had multiple procedures on the right hand including 2 carpal tunnel releases done in 2017 and 2018.  These were followed by a reported right thumb CMC arthroplasty in approximately 2020.  This was all done in [place].  She does not have her records today.  She notes that unfortunately she is still suffering with numbness and tingling in the right hand which is constant to the thumb and index finger.  She also reports significant hypersensitivity in the distribution of the radial sensory nerve and at the prior thumb surgical site.  She is also having significant right thumb CMC basal joint pain which unfortunately did not improve to any significant extent after her initial thumb CMC surgery.  She presents for initial evaluation with no other complaints.    The patient is a/an retired.    Onset of symptoms/DOI was many years ago.    Symptoms are aggravated by activity and movement.    Symptoms are alleviated by rest.    Symptoms consist of pain and numbness/tingling.    The patient rates their pain as a 5/10.    Attempted treatment(s) and/or interventions include activity modifications, rest, steroid injection and surgical intervention.     The patient denies any fevers, chills, N/V, D/C and  presents for evaluation.       Past Medical History:   Diagnosis Date    Constipation     Dependence on nocturnal oxygen therapy     2 liters/ nasal cannula    Depression     Emphysema lung     Encounter for blood transfusion     GERD (gastroesophageal reflux disease)     Migraines     Neck pain      Past Surgical History:   Procedure Laterality Date    AUGMENTATION OF BREAST      CERVICAL FUSION      COLONOSCOPY      COLONOSCOPY N/A 6/2/2022    Procedure: COLONOSCOPY;  Surgeon: Joni Burnett MD;  Location: [facility] ENDO;  Service: Endoscopy;  Laterality: N/A;    COLONOSCOPY N/A 9/30/2022    Procedure: COLONOSCOPY;  Surgeon: Joni Burnett MD;  Location: [facility] ENDO;  Service: Endoscopy;  Laterality: N/A;    ENDOSCOPIC RELEASE OF BOTH CARPAL TUNNELS      2 carpal surgeries on right, one on left    ESOPHAGEAL DILATION      ESOPHAGOGASTRODUODENOSCOPY N/A 02/08/2022    Procedure: EGD (ESOPHAGOGASTRODUODENOSCOPY);  Surgeon: Joni Burnett MD;  Location: [facility] ENDO;  Service: Endoscopy;  Laterality: N/A;    INTERPOSITION ARTHROPLASTY OF CARPOMETACARPAL JOINTS Right     thumb    TONSILLECTOMY      TOTAL HIP ARTHROPLASTY Bilateral     UPPER GASTROINTESTINAL ENDOSCOPY       Review of patient's allergies indicates:   Allergen Reactions    Penicillins Hives     Social History     Social History Narrative    Not on file     Family History   Problem Relation Age of Onset    Breast cancer Maternal Aunt     Colon cancer Neg Hx     Colon polyps Neg Hx     Crohn's disease Neg Hx     Ulcerative colitis Neg Hx     Stomach cancer Neg Hx     Rectal cancer Neg Hx          Current Outpatient Medications:     acetaminophen (TYLENOL) 325 MG tablet, Take 325 mg by mouth., Disp: , Rfl:     aspirin (ECOTRIN) 81 MG EC tablet, Take 81 mg by mouth once daily., Disp: , Rfl:     baclofen (LIORESAL) 5 mg Tab tablet, Take 1 tablet (5 mg total) by mouth 3 (three) times daily., Disp: 90 tablet, Rfl: 2    bisacodyL (DULCOLAX) 5 mg EC tablet,  Dulcolax (bisacodyl) 5 mg tablet,delayed release  take 2 (5mg) tablets as needed if no bowel movement for 2-3 days, Disp: , Rfl:     buPROPion (WELLBUTRIN XL) 150 MG TB24 tablet, TAKE THREE TABLETS BY MOUTH EVERY DAY FOR DEPRESSION, Disp: , Rfl:     calcium carbonate-vitamin D3 250-125 mg 250 mg-3.125 mcg (125 unit) Tab, TAKE 2 TABLETS BY MOUTH EVERY DAY AS A MINERAL SUPPLEMENT, Disp: , Rfl:     cyanocobalamin (VITAMIN B-12) 1000 MCG tablet, TAKE ONE TABLET BY MOUTH EVERY DAY FOR VITAMIN DEFICIENCIES, Disp: , Rfl:     ferrous sulfate (FEOSOL) 325 mg (65 mg iron) Tab tablet, Iron (ferrous sulfate) 325 mg (65 mg iron) tablet  Take 1 tablet every other day by oral route., Disp: , Rfl:     hydrOXYzine (ATARAX) 50 MG tablet, TAKE ONE TABLET BY MOUTH THREE TIMES A DAY AS NEEDED FOR ALLERGIES/ITCHING ANXIETY, Disp: , Rfl:     hydrOXYzine (ATARAX) 50 MG tablet, Take 50 mg by mouth., Disp: , Rfl:     LIDOcaine (XYLOCAINE) 5 % Oint ointment, Apply topically., Disp: , Rfl:     methocarbamoL (ROBAXIN) 500 MG Tab, TAKE ONE TABLET BY MOUTH THREE TIMES A DAY IF NEEDED (MUSCLE RELAXANT) (START WITH 1/2 TABLET AT BEDTIME THEN ADD IN MORNING DOSE AND IF  TOLERATED THEN TAKE 1/2 TABLET THREE TIMES A DAY FOR A FEW DAYS AND THEN  CAN INCREASE TO A FULL TABLET IF NO SIDE EFFECTS) (MUSCLE RELAXANT)  (START WITH 1/2 TABLET AT BEDTIME THEN ADD IN MORNING DOSE AND IF   TOLERATED THEN TAKE 1/2 TABLET THREE TIMES A DAY FOR A FEW DAYS AND THEN   CAN INCREASE TO A FULL TABLET IF NO SIDE EFFECTS), Disp: , Rfl:     omeprazole (PRILOSEC OTC) 20 MG tablet, Take 20 mg by mouth., Disp: , Rfl:     omeprazole (PRILOSEC) 40 MG capsule, omeprazole 40 mg capsule,delayed release  Take 1 capsule every day by oral route., Disp: , Rfl:     polyethylene glycol (GLYCOLAX) 17 gram/dose powder, Miralax 17 gram/dose oral powder  1 capful daily at bedtime, Disp: , Rfl:     sumatriptan (IMITREX) 100 MG tablet, Take 100 mg by mouth every 2 (two) hours as needed for  Migraine., Disp: , Rfl:     trazodone HCl (TRAZODONE ORAL), trazodone  100 mg  at night, Disp: , Rfl:       Review of Systems:  As per HPI otherwise noncontributory    OBJECTIVE:      Vital Signs (Most Recent):  Vitals:    08/31/23 0954   Weight: 47.6 kg (105 lb)   Height: 5' (1.524 m)     Body mass index is 20.51 kg/m².      Physical Exam:  Constitutional: The patient appears well-developed and well-nourished. No distress.   Skin: No lesions appreciated  Head: Normocephalic and atraumatic.   Nose: Nose normal.   Ears: No deformities seen  Eyes: Conjunctivae and EOM are normal.   Neck: No tracheal deviation present.   Cardiovascular: Normal rate and intact distal pulses.    Pulmonary/Chest: Effort normal. No respiratory distress.   Abdominal: There is no guarding.   Neurological: The patient is alert.   Psychiatric: The patient has a normal mood and affect.     Right Hand/Wrist Examination:    Observation/Inspection:  Swelling  none    Deformity  none  Discoloration  none     Scars   extensile revision carpal tunnel, right thumb CMC all well healed    Atrophy  none    HAND/WRIST EXAMINATION:  Finkelstein's Test   Neg  WHAT Test    Neg  Snuff box tenderness   Neg  Ching's Test    Neg  Hook of Hamate Tenderness  Neg  CMC grind    positive  Circumduction test   positive    Neurovascular Exam:  Digits WWP, brisk CR < 3s throughout  NVI motor/LTS to M/R/U nerves, radial pulse 2+ except for hypersensitivity and decreased light touch sensation to the radial sensory distribution of the right dorsal thumb and hand as well as decreased light touch sensation to the right thumb and index finger median nerve distributions at baseline  Tinel's Test - Carpal Tunnel  Neg  Tinel's Test - Cubital Tunnel  Neg  Phalen's Test    Neg  Median Nerve Compression Test Neg    ROM hand full, painless    ROM wrist full, painless    ROM elbow full, painless    Abdomen not guarded  Respirations nonlabored  Perfusion intact    Diagnostic  Results:     Imaging - I independently viewed the patient's imaging as well as the radiology report.  Xrays of the patient's right hand  demonstrate no evidence of any acute fractures or dislocations with postop changes of the right thumb CMC joint and severe STT arthritis and residual CMC arthritis versus subsidence.    EMG - none    ASSESSMENT/PLAN:      68 y.o. yo female with recurrent/persistent right thumb basal joint pain, STT arthritis, numbness tingling right hand  Plan: The patient and I had a thorough discussion today.  We discussed the working diagnosis as well as several other potential alternative diagnoses.  Treatment options were discussed, both conservative and surgical.  Conservative treatment options would include things such as activity modifications, workplace modifications, a period of rest, oral vs topical OTC and prescription anti-inflammatory medications, occupational therapy, splinting/bracing, immobilization, corticosteroid injections, and others.  Surgical options were discussed as well.     At this time, the patient would like to proceed with a trial of corticosteroid injection into the right thumb basal joint today which will be administered.  She reports that she has a thumb spica brace at home.  I would like for her to obtain her outside records and bring these to me for review prior to surgical planning.  Follow-up in 6 weeks or sooner for any problems.    Should the patient's symptoms worsen, persist, or fail to improve they should return for reevaluation and I would be happy to see them back anytime.        Hunter Smith M.D.    Please be aware that this note has been generated with the assistance of [employer] voice-to-text.  Please excuse any spelling or grammatical errors.    Thank you for choosing Dr. Hunter Smith for your orthopedic hand and upper extremity care. It is our goal to provide you with exceptional care that will help keep you healthy, active, and get you back in the  game.     If you felt that you received exemplary care today, please consider leaving feedback for Dr. Smith on [employer] at [email or web address].    Please do not hesitate to reach out to us via email, phone, or [employer] with any questions, concerns, or feedback.

## 2023-08-31 NOTE — PROCEDURES
Small Joint Aspiration/Injection: R thumb CMC    Date/Time: 8/31/2023 10:00 AM    Performed by: Hunter Smith MD  Authorized by: Hunter Smith MD    Consent Done?:  Yes (Verbal)  Indications:  Pain  Site marked: the procedure site was marked    Timeout: prior to procedure the correct patient, procedure, and site was verified    Prep: patient was prepped and draped in usual sterile fashion      Local anesthesia used?: Yes    Local anesthetic:  Topical anesthetic  Location:  Thumb  Site:  R thumb CMC  Ultrasonic guidance for needle placement?: No    Needle size:  25 G  Approach:  Dorsal  Medications:  40 mg triamcinolone acetonide 40 mg/mL  Patient tolerance:  Patient tolerated the procedure well with no immediate complications

## 2023-10-24 ENCOUNTER — TELEPHONE (OUTPATIENT)
Dept: ORTHOPEDICS | Facility: CLINIC | Age: 68
End: 2023-10-24
Payer: OTHER GOVERNMENT

## 2023-10-24 DIAGNOSIS — M50.30 DDD (DEGENERATIVE DISC DISEASE), CERVICAL: Primary | ICD-10-CM

## 2023-10-24 NOTE — TELEPHONE ENCOUNTER
I returned patient call to schedule an appointment. She did not answer I left her a message I will schedule her an appointment.

## 2023-10-24 NOTE — TELEPHONE ENCOUNTER
Attempt to contact patient in regards to scheduling an appointment. Left message for patient to return call back to [phone]. Thanks.

## 2023-10-25 ENCOUNTER — TELEPHONE (OUTPATIENT)
Dept: ORTHOPEDICS | Facility: CLINIC | Age: 68
End: 2023-10-25
Payer: OTHER GOVERNMENT

## 2023-10-25 NOTE — TELEPHONE ENCOUNTER
Spoke to patient regarding rescheduling her appointment. Patient scheduled for 2:30 am and x-ray for 1:30 pm at  the [facility] on 11/03/2023. Patient stated thank you. Thanks.

## 2023-11-03 ENCOUNTER — OFFICE VISIT (OUTPATIENT)
Dept: ORTHOPEDICS | Facility: CLINIC | Age: 68
End: 2023-11-03
Payer: OTHER GOVERNMENT

## 2023-11-03 DIAGNOSIS — M54.12 CERVICAL RADICULOPATHY: ICD-10-CM

## 2023-11-03 DIAGNOSIS — Z98.1 S/P CERVICAL SPINAL FUSION: Primary | ICD-10-CM

## 2023-11-03 PROCEDURE — 99213 OFFICE O/P EST LOW 20 MIN: CPT | Mod: 95,,, | Performed by: REGISTERED NURSE

## 2023-11-03 PROCEDURE — 99213 PR OFFICE/OUTPT VISIT, EST, LEVL III, 20-29 MIN: ICD-10-PCS | Mod: 95,,, | Performed by: REGISTERED NURSE

## 2023-11-03 NOTE — PROGRESS NOTES
Established Patient - Audio Only Telehealth Visit     The patient location is: home  The chief complaint leading to consultation is: f/u  Visit type: Virtual visit with audio only (telephone)  Total time spent with patient: 15min     The reason for the audio only service rather than synchronous audio and video virtual visit was related to technical difficulties or patient preference/necessity.     Each patient to whom I provide medical services by telemedicine is:  (1) informed of the relationship between the physician and patient and the respective role of any other health care provider with respect to management of the patient; and (2) notified that they may decline to receive medical services by telemedicine and may withdraw from such care at any time. Patient verbally consented to receive this service via voice-only telephone call.    DATE: 10/30/2023  PATIENT: Saira Osuna    Attending Physician: Cyrus Mon M.D.    HISTORY:  Saira Osuna is a 68 y.o. female who returns to me today for follow up.  She was last seen by me 6/12/2023.  Today she is doing well but notes neck pain.    The Patient denies myelopathic symptoms such as handwriting changes or difficulty with buttons/coins/keys. Denies perineal paresthesias, bowel/bladder dysfunction.    PMH/PSH/FamHx/SocHx:  Unchanged from prior visit    ROS:  REVIEW OF SYSTEMS:  Constitution: Negative. Negative for chills, fever and night sweats.   HENT: Negative for congestion and headaches.    Eyes: Negative for blurred vision, left vision loss and right vision loss.   Cardiovascular: Negative for chest pain and syncope.   Respiratory: Negative for cough and shortness of breath.    Endocrine: Negative for polydipsia, polyphagia and polyuria.   Hematologic/Lymphatic: Negative for bleeding problem. Does not bruise/bleed easily.   Skin: Negative for dry skin, itching and rash.   Musculoskeletal: Negative for falls and muscle weakness.   Gastrointestinal: Negative for  "abdominal pain and bowel incontinence.   Allergic/Immunologic: Negative for hives and persistent infections.  Genitourinary: Negative for urinary retention/incontinence and nocturia.   Neurological: negative for disturbances in coordination, no myelopathic symptoms such as handwriting changes or difficulty with buttons, coins, keys or small objects. No loss of balance and seizures.   Psychiatric/Behavioral: Negative for depression. The patient does not have insomnia.   Denies myelopathic symptoms, perineal paresthesias, bowel or bladder incontinence    EXAM:  There were no vitals taken for this visit.  Stable.     IMAGING:  Today I personally re-reviewed AP, Lat and Flex/Ex  upright C-spine films that demonstrate hardware in place without failure.     T-spine shows mild DDD throughout with scoliosis. Age indeterminate mild biconcave compression abnormality T9 vertebral segment.     L-spine shows severe DDD.  Grade 1 retrolisthesis L1 with respect L2.  Grade 1 retrolisthesis L3 with respect L4.  Mild grade 1 anterolisthesis L4 with respect L5.      Lumbar and thoracic MRI shows mild spinal canal stenosis at T10-L1 and L3-L5.  Moderate neural foraminal narrowing noted at L2-S1. Chronic compression fracture of the L1 vertebral body with moderate height loss.  Hepatomegaly with intrahepatic biliary dilatation and several indeterminate T2 hyperintense lesions.  Recommend further evaluation with contrast enhanced MRI of the abdomen with MRCP.    There is no height or weight on file to calculate BMI.    No results found for: "HGBA1C"      ASSESSMENT/PLAN:    There are no diagnoses linked to this encounter.    Cervical MRI ordered, I will call with results and likely refer to [employer] SCHULER in pain mgmt.      This service was not originating from a related E/M service provided within the previous 7 days nor will  to an E/M service or procedure within the next 24 hours or my soonest available appointment.  Prevailing " standard of care was able to be met in this audio-only visit.

## 2023-11-14 ENCOUNTER — HOSPITAL ENCOUNTER (OUTPATIENT)
Dept: RADIOLOGY | Facility: CLINIC | Age: 68
Discharge: HOME | End: 2023-11-14
Attending: PHYSICIAN ASSISTANT
Payer: OTHER GOVERNMENT

## 2023-11-14 DIAGNOSIS — M50.30 DDD (DEGENERATIVE DISC DISEASE), CERVICAL: ICD-10-CM

## 2023-11-14 PROCEDURE — 72040 XR CERVICAL SPINE AP LATERAL: ICD-10-PCS | Mod: 26,,, | Performed by: RADIOLOGY

## 2023-11-14 PROCEDURE — 72040 X-RAY EXAM NECK SPINE 2-3 VW: CPT | Mod: TC,FY,PO

## 2023-11-14 PROCEDURE — 72040 X-RAY EXAM NECK SPINE 2-3 VW: CPT | Mod: 26,,, | Performed by: RADIOLOGY

## 2023-11-30 ENCOUNTER — OFFICE VISIT (OUTPATIENT)
Dept: ORTHOPEDICS | Facility: CLINIC | Age: 68
End: 2023-11-30
Payer: OTHER GOVERNMENT

## 2023-11-30 VITALS — HEIGHT: 60 IN | BODY MASS INDEX: 20.62 KG/M2 | WEIGHT: 105 LBS

## 2023-11-30 DIAGNOSIS — M19.031 ARTHRITIS OF SCAPHOID-TRAPEZIUM-TRAPEZOID JOINT OF RIGHT HAND: ICD-10-CM

## 2023-11-30 DIAGNOSIS — M18.11 ARTHRITIS OF CARPOMETACARPAL (CMC) JOINT OF RIGHT THUMB: Primary | ICD-10-CM

## 2023-11-30 PROCEDURE — 99214 PR OFFICE/OUTPT VISIT, EST, LEVL IV, 30-39 MIN: ICD-10-PCS | Mod: S$PBB,,, | Performed by: ORTHOPAEDIC SURGERY

## 2023-11-30 PROCEDURE — 99214 OFFICE O/P EST MOD 30 MIN: CPT | Mod: S$PBB,,, | Performed by: ORTHOPAEDIC SURGERY

## 2023-11-30 PROCEDURE — 99999 PR PBB SHADOW E&M-EST. PATIENT-LVL V: ICD-10-PCS | Mod: PBBFAC,,, | Performed by: ORTHOPAEDIC SURGERY

## 2023-11-30 PROCEDURE — 99999 PR PBB SHADOW E&M-EST. PATIENT-LVL V: CPT | Mod: PBBFAC,,, | Performed by: ORTHOPAEDIC SURGERY

## 2023-11-30 PROCEDURE — 99215 OFFICE O/P EST HI 40 MIN: CPT | Mod: PBBFAC | Performed by: ORTHOPAEDIC SURGERY

## 2023-11-30 RX ORDER — MUPIROCIN 20 MG/G
OINTMENT TOPICAL
OUTPATIENT
Start: 2023-11-30

## 2023-11-30 RX ORDER — CLINDAMYCIN PHOSPHATE 900 MG/50ML
900 INJECTION, SOLUTION INTRAVENOUS
OUTPATIENT
Start: 2023-11-30

## 2023-11-30 NOTE — H&P
[facility]  History & Physical  Orthopedics     SUBJECTIVE:       COVID-19 attestation:  This patient was treated during the COVID-19 pandemic.  This was discussed with the patient, they are aware of our current policies and procedures, were given the option of delaying their visit and or switching to a virtual visit, delaying their surgery when applicable, and they elect to proceed.     Chief Complaint:  Right thumb pain, right hand numbness and tingling     Referring Provider: No ref. provider found      History of Present Illness:  Patient is a 68 y.o. right hand dominant female who presents today with complaints of numbness and tingling in the right hand as well as some right thumb pain.  The patient has had multiple procedures on the right hand including 2 carpal tunnel releases done in 2017 and 2018.  These were followed by a reported right thumb CMC arthroplasty in approximately 2020.  This was all done in [place].  She does not have her records today.  She notes that unfortunately she is still suffering with numbness and tingling in the right hand which is constant to the thumb and index finger.  She also reports significant hypersensitivity in the distribution of the radial sensory nerve and at the prior thumb surgical site.  She is also having significant right thumb CMC basal joint pain which unfortunately did not improve to any significant extent after her initial thumb CMC surgery.  She presents for initial evaluation with no other complaints.     Interval history November 30, 2023: The patient returns today for re-evaluation.  She notes that her biggest issue at this point in time is persistent pain in the right thumb CMC and STT regions both volarly dorsally and radially.  She brings in some outside records today which demonstrate that she in 2019 had a proximal 1st metacarpal and distal trapezial partial excision with palmaris longus interposition and pinning.  She notes  that this really did not help her very much and certainly has not held up.  She does endorse once again constant numbness and tingling to her right thumb and index finger and is status post extensive cervical spine fusion from see 1 to T1 per her report as well as 2 carpal tunnel releases.  She presents for re-evaluation with no other complaints.  Her prior injection helped temporarily but has worn off and she would like to discuss additional options.     The patient is a/an retired.     Onset of symptoms/DOI was many years ago.     Symptoms are aggravated by activity and movement.     Symptoms are alleviated by rest.     Symptoms consist of pain and numbness/tingling.     The patient rates their pain as a 5/10.     Attempted treatment(s) and/or interventions include activity modifications, rest, steroid injection and surgical intervention.     The patient denies any fevers, chills, N/V, D/C and presents for evaluation.             Past Medical History:   Diagnosis Date    Constipation      Dependence on nocturnal oxygen therapy       2 liters/ nasal cannula    Depression      Emphysema lung      Encounter for blood transfusion      GERD (gastroesophageal reflux disease)      Migraines      Neck pain              Past Surgical History:   Procedure Laterality Date    AUGMENTATION OF BREAST        CERVICAL FUSION        COLONOSCOPY        COLONOSCOPY N/A 6/2/2022     Procedure: COLONOSCOPY;  Surgeon: Joni Burnett MD;  Location: [facility];  Service: Endoscopy;  Laterality: N/A;    COLONOSCOPY N/A 9/30/2022     Procedure: COLONOSCOPY;  Surgeon: Joni Burnett MD;  Location: [facility];  Service: Endoscopy;  Laterality: N/A;    ENDOSCOPIC RELEASE OF BOTH CARPAL TUNNELS         2 carpal surgeries on right, one on left    ESOPHAGEAL DILATION        ESOPHAGOGASTRODUODENOSCOPY N/A 02/08/2022     Procedure: EGD (ESOPHAGOGASTRODUODENOSCOPY);  Surgeon: Joni Burnett MD;  Location: [facility];  Service: Endoscopy;   Laterality: N/A;    INTERPOSITION ARTHROPLASTY OF CARPOMETACARPAL JOINTS Right       thumb    TONSILLECTOMY        TOTAL HIP ARTHROPLASTY Bilateral      UPPER GASTROINTESTINAL ENDOSCOPY               Review of patient's allergies indicates:   Allergen Reactions    Penicillins Hives      Social History          Social History Narrative    Not on file            Family History   Problem Relation Age of Onset    Breast cancer Maternal Aunt      Colon cancer Neg Hx      Colon polyps Neg Hx      Crohn's disease Neg Hx      Ulcerative colitis Neg Hx      Stomach cancer Neg Hx      Rectal cancer Neg Hx              Current Outpatient Medications:     acetaminophen (TYLENOL) 325 MG tablet, Take 325 mg by mouth., Disp: , Rfl:     aspirin (ECOTRIN) 81 MG EC tablet, Take 81 mg by mouth once daily., Disp: , Rfl:     bisacodyL (DULCOLAX) 5 mg EC tablet, Dulcolax (bisacodyl) 5 mg tablet,delayed release  take 2 (5mg) tablets as needed if no bowel movement for 2-3 days, Disp: , Rfl:     buPROPion (WELLBUTRIN XL) 150 MG TB24 tablet, TAKE THREE TABLETS BY MOUTH EVERY DAY FOR DEPRESSION, Disp: , Rfl:     calcium carbonate-vitamin D3 250-125 mg 250 mg-3.125 mcg (125 unit) Tab, TAKE 2 TABLETS BY MOUTH EVERY DAY AS A MINERAL SUPPLEMENT, Disp: , Rfl:     cyanocobalamin (VITAMIN B-12) 1000 MCG tablet, TAKE ONE TABLET BY MOUTH EVERY DAY FOR VITAMIN DEFICIENCIES, Disp: , Rfl:     ferrous sulfate (FEOSOL) 325 mg (65 mg iron) Tab tablet, Iron (ferrous sulfate) 325 mg (65 mg iron) tablet  Take 1 tablet every other day by oral route., Disp: , Rfl:     hydrOXYzine (ATARAX) 50 MG tablet, TAKE ONE TABLET BY MOUTH THREE TIMES A DAY AS NEEDED FOR ALLERGIES/ITCHING ANXIETY, Disp: , Rfl:     hydrOXYzine (ATARAX) 50 MG tablet, Take 50 mg by mouth., Disp: , Rfl:     LIDOcaine (XYLOCAINE) 5 % Oint ointment, Apply topically., Disp: , Rfl:     methocarbamoL (ROBAXIN) 500 MG Tab, TAKE ONE TABLET BY MOUTH THREE TIMES A DAY IF NEEDED (MUSCLE RELAXANT) (START  WITH 1/2 TABLET AT BEDTIME THEN ADD IN MORNING DOSE AND IF  TOLERATED THEN TAKE 1/2 TABLET THREE TIMES A DAY FOR A FEW DAYS AND THEN  CAN INCREASE TO A FULL TABLET IF NO SIDE EFFECTS) (MUSCLE RELAXANT)  (START WITH 1/2 TABLET AT BEDTIME THEN ADD IN MORNING DOSE AND IF   TOLERATED THEN TAKE 1/2 TABLET THREE TIMES A DAY FOR A FEW DAYS AND THEN   CAN INCREASE TO A FULL TABLET IF NO SIDE EFFECTS), Disp: , Rfl:     omeprazole (PRILOSEC OTC) 20 MG tablet, Take 20 mg by mouth., Disp: , Rfl:     omeprazole (PRILOSEC) 40 MG capsule, omeprazole 40 mg capsule,delayed release  Take 1 capsule every day by oral route., Disp: , Rfl:     polyethylene glycol (GLYCOLAX) 17 gram/dose powder, Miralax 17 gram/dose oral powder  1 capful daily at bedtime, Disp: , Rfl:     sumatriptan (IMITREX) 100 MG tablet, Take 100 mg by mouth every 2 (two) hours as needed for Migraine., Disp: , Rfl:     trazodone HCl (TRAZODONE ORAL), trazodone  100 mg  at night, Disp: , Rfl:         Review of Systems:  As per HPI otherwise noncontributory     OBJECTIVE:       Vital Signs (Most Recent):  Vitals       Vitals:     11/30/23 1150   Weight: 47.6 kg (105 lb)   Height: 5' (1.524 m)         Body mass index is 20.51 kg/m².        Physical Exam:  Constitutional: The patient appears well-developed and well-nourished. No distress.   Skin: No lesions appreciated  Head: Normocephalic and atraumatic.   Nose: Nose normal.   Ears: No deformities seen  Eyes: Conjunctivae and EOM are normal.   Neck: No tracheal deviation present.   Cardiovascular: Normal rate and intact distal pulses.    Pulmonary/Chest: Effort normal. No respiratory distress.   Abdominal: There is no guarding.   Neurological: The patient is alert.   Psychiatric: The patient has a normal mood and affect.      Right Hand/Wrist Examination:     Observation/Inspection:  Swelling                       none                  Deformity                     none  Discoloration               none                   Scars                           extensile revision carpal tunnel, right thumb CMC all well healed                  Atrophy                        none     HAND/WRIST EXAMINATION:  Finkelstein's Test                                Neg  WHAT Test                                         Neg  Snuff box tenderness                          Neg  Ching's Test                                     Neg  Hook of Hamate Tenderness              Neg  CMC grind                                           positive  Circumduction test                              Positive  FCR intact  Tender to palpation at the distal scaphoid/STT joint     Neurovascular Exam:  Digits WWP, brisk CR < 3s throughout  NVI motor/LTS to M/R/U nerves, radial pulse 2+ except for hypersensitivity and decreased light touch sensation to the radial sensory distribution of the right dorsal thumb and hand as well as decreased light touch sensation to the right thumb and index finger median nerve distributions at baseline  Tinel's Test - Carpal Tunnel                Neg  Tinel's Test - Cubital Tunnel               Neg  Phalen's Test                                      Neg  Median Nerve Compression Test       Neg     ROM hand full, painless except the right thumb CMC/STT joints     ROM wrist full, painless    ROM elbow full, painless     Abdomen not guarded  Respirations nonlabored  Perfusion intact     Diagnostic Results:     Imaging - I independently viewed the patient's imaging as well as the radiology report.  Xrays of the patient's right hand  demonstrate no evidence of any acute fractures or dislocations with postop changes of the right thumb CMC joint and severe STT arthritis and residual CMC arthritis versus subsidence.     EMG - none     ASSESSMENT/PLAN:       68 y.o. yo female with recurrent/persistent right thumb basal joint pain, STT arthritis, numbness tingling right hand  Plan: The patient and I had a thorough discussion today.  We discussed the  working diagnosis as well as several other potential alternative diagnoses.  Treatment options were discussed, both conservative and surgical.  Conservative treatment options would include things such as activity modifications, workplace modifications, a period of rest, oral vs topical OTC and prescription anti-inflammatory medications, occupational therapy, splinting/bracing, immobilization, corticosteroid injections, and others.  Surgical options were discussed as well.      At this time, Saira would like to proceed with surgical intervention.  This will consist of a revision right thumb CMC arthroplasty with possible LR TI versus suture suspension, possible tendon harvest, distal scaphoid excision with possible tendon interposition and any other indicated procedures at the time of surgery on January 19, 2024 which I feel is reasonable.  I have very clearly instructed her that this would not be expected to improve her numbness and tingling in the right hand and I certainly can not guarantee any results given the revision nature of this and other factors.  She would like to proceed and we will get this scheduled.       The patient has not responded to adequate non operative treatment at this time and/or non operative treatment is not indicated. Thus, the risks, benefits and alternatives to surgery were discussed with the patient in detail.  Specific risks include but are not limited to bleeding, infection, vessel and/or nerve damage, pain, numbness, tingling, compartment syndrome, need for additional surgery, failure to return to pre-injury and/or preoperative functional status, inability to return to work, scar sensitivity, delayed healing, complex regional pain syndrome, weakness, pulley injury, tendon injury, bowstringing, partial and/or incomplete relief of symptoms, persistence of and/or worsening of symptoms, hardware and/or surgical failure, prominent and/or symptomatic hardware possibly necessitating future  removal, osteomyelitis, amputation, loss of function, stiffness, rotational malalignment, functional debility, dysfunction, decreased  strength, need for prolonged postoperative rehabilitation, malunion, nonunion, deep venous thrombosis, pulmonary embolism, arthritis and death.  The patient states an understanding and wishes to proceed with surgery.   All questions were answered.  No guarantees were implied or stated.  Written informed consent was obtained.       Hunter Smith M.D.     Please be aware that this note has been generated with the assistance of [employer] voice-to-text.  Please excuse any spelling or grammatical errors.     Thank you for choosing Dr. Hunter Smith for your orthopedic hand and upper extremity care. It is our goal to provide you with exceptional care that will help keep you healthy, active, and get you back in the game.     If you felt that you received exemplary care today, please consider leaving feedback for Dr. Smith on [email or web address] at [email or web address].     Please do not hesitate to reach out to us via email, phone, or MyChart with any questions, concerns, or [email or web address]

## 2023-11-30 NOTE — PROGRESS NOTES
[facility]  History & Physical  Orthopedics    SUBJECTIVE:      COVID-19 attestation:  This patient was treated during the COVID-19 pandemic.  This was discussed with the patient, they are aware of our current policies and procedures, were given the option of delaying their visit and or switching to a virtual visit, delaying their surgery when applicable, and they elect to proceed.    Chief Complaint:  Right thumb pain, right hand numbness and tingling    Referring Provider: No ref. provider found     History of Present Illness:  Patient is a 68 y.o. right hand dominant female who presents today with complaints of numbness and tingling in the right hand as well as some right thumb pain.  The patient has had multiple procedures on the right hand including 2 carpal tunnel releases done in 2017 and 2018.  These were followed by a reported right thumb CMC arthroplasty in approximately 2020.  This was all done in [place].  She does not have her records today.  She notes that unfortunately she is still suffering with numbness and tingling in the right hand which is constant to the thumb and index finger.  She also reports significant hypersensitivity in the distribution of the radial sensory nerve and at the prior thumb surgical site.  She is also having significant right thumb CMC basal joint pain which unfortunately did not improve to any significant extent after her initial thumb CMC surgery.  She presents for initial evaluation with no other complaints.    Interval history November 30, 2023: The patient returns today for re-evaluation.  She notes that her biggest issue at this point in time is persistent pain in the right thumb CMC and STT regions both volarly dorsally and radially.  She brings in some outside records today which demonstrate that she in 2019 had a proximal 1st metacarpal and distal trapezial partial excision with palmaris longus interposition and pinning.  She notes that this  really did not help her very much and certainly has not held up.  She does endorse once again constant numbness and tingling to her right thumb and index finger and is status post extensive cervical spine fusion from see 1 to T1 per her report as well as 2 carpal tunnel releases.  She presents for re-evaluation with no other complaints.  Her prior injection helped temporarily but has worn off and she would like to discuss additional options.    The patient is a/an retired.    Onset of symptoms/DOI was many years ago.    Symptoms are aggravated by activity and movement.    Symptoms are alleviated by rest.    Symptoms consist of pain and numbness/tingling.    The patient rates their pain as a 5/10.    Attempted treatment(s) and/or interventions include activity modifications, rest, steroid injection and surgical intervention.     The patient denies any fevers, chills, N/V, D/C and presents for evaluation.       Past Medical History:   Diagnosis Date    Constipation     Dependence on nocturnal oxygen therapy     2 liters/ nasal cannula    Depression     Emphysema lung     Encounter for blood transfusion     GERD (gastroesophageal reflux disease)     Migraines     Neck pain      Past Surgical History:   Procedure Laterality Date    AUGMENTATION OF BREAST      CERVICAL FUSION      COLONOSCOPY      COLONOSCOPY N/A 6/2/2022    Procedure: COLONOSCOPY;  Surgeon: Joni Burnett MD;  Location: [facility];  Service: Endoscopy;  Laterality: N/A;    COLONOSCOPY N/A 9/30/2022    Procedure: COLONOSCOPY;  Surgeon: Joni Burnett MD;  Location: [facility];  Service: Endoscopy;  Laterality: N/A;    ENDOSCOPIC RELEASE OF BOTH CARPAL TUNNELS      2 carpal surgeries on right, one on left    ESOPHAGEAL DILATION      ESOPHAGOGASTRODUODENOSCOPY N/A 02/08/2022    Procedure: EGD (ESOPHAGOGASTRODUODENOSCOPY);  Surgeon: Joni Burnett MD;  Location: [facility];  Service: Endoscopy;  Laterality: N/A;    INTERPOSITION ARTHROPLASTY OF  CARPOMETACARPAL JOINTS Right     thumb    TONSILLECTOMY      TOTAL HIP ARTHROPLASTY Bilateral     UPPER GASTROINTESTINAL ENDOSCOPY       Review of patient's allergies indicates:   Allergen Reactions    Penicillins Hives     Social History     Social History Narrative    Not on file     Family History   Problem Relation Age of Onset    Breast cancer Maternal Aunt     Colon cancer Neg Hx     Colon polyps Neg Hx     Crohn's disease Neg Hx     Ulcerative colitis Neg Hx     Stomach cancer Neg Hx     Rectal cancer Neg Hx          Current Outpatient Medications:     acetaminophen (TYLENOL) 325 MG tablet, Take 325 mg by mouth., Disp: , Rfl:     aspirin (ECOTRIN) 81 MG EC tablet, Take 81 mg by mouth once daily., Disp: , Rfl:     bisacodyL (DULCOLAX) 5 mg EC tablet, Dulcolax (bisacodyl) 5 mg tablet,delayed release  take 2 (5mg) tablets as needed if no bowel movement for 2-3 days, Disp: , Rfl:     buPROPion (WELLBUTRIN XL) 150 MG TB24 tablet, TAKE THREE TABLETS BY MOUTH EVERY DAY FOR DEPRESSION, Disp: , Rfl:     calcium carbonate-vitamin D3 250-125 mg 250 mg-3.125 mcg (125 unit) Tab, TAKE 2 TABLETS BY MOUTH EVERY DAY AS A MINERAL SUPPLEMENT, Disp: , Rfl:     cyanocobalamin (VITAMIN B-12) 1000 MCG tablet, TAKE ONE TABLET BY MOUTH EVERY DAY FOR VITAMIN DEFICIENCIES, Disp: , Rfl:     ferrous sulfate (FEOSOL) 325 mg (65 mg iron) Tab tablet, Iron (ferrous sulfate) 325 mg (65 mg iron) tablet  Take 1 tablet every other day by oral route., Disp: , Rfl:     hydrOXYzine (ATARAX) 50 MG tablet, TAKE ONE TABLET BY MOUTH THREE TIMES A DAY AS NEEDED FOR ALLERGIES/ITCHING ANXIETY, Disp: , Rfl:     hydrOXYzine (ATARAX) 50 MG tablet, Take 50 mg by mouth., Disp: , Rfl:     LIDOcaine (XYLOCAINE) 5 % Oint ointment, Apply topically., Disp: , Rfl:     methocarbamoL (ROBAXIN) 500 MG Tab, TAKE ONE TABLET BY MOUTH THREE TIMES A DAY IF NEEDED (MUSCLE RELAXANT) (START WITH 1/2 TABLET AT BEDTIME THEN ADD IN MORNING DOSE AND IF  TOLERATED THEN TAKE 1/2  TABLET THREE TIMES A DAY FOR A FEW DAYS AND THEN  CAN INCREASE TO A FULL TABLET IF NO SIDE EFFECTS) (MUSCLE RELAXANT)  (START WITH 1/2 TABLET AT BEDTIME THEN ADD IN MORNING DOSE AND IF   TOLERATED THEN TAKE 1/2 TABLET THREE TIMES A DAY FOR A FEW DAYS AND THEN   CAN INCREASE TO A FULL TABLET IF NO SIDE EFFECTS), Disp: , Rfl:     omeprazole (PRILOSEC OTC) 20 MG tablet, Take 20 mg by mouth., Disp: , Rfl:     omeprazole (PRILOSEC) 40 MG capsule, omeprazole 40 mg capsule,delayed release  Take 1 capsule every day by oral route., Disp: , Rfl:     polyethylene glycol (GLYCOLAX) 17 gram/dose powder, Miralax 17 gram/dose oral powder  1 capful daily at bedtime, Disp: , Rfl:     sumatriptan (IMITREX) 100 MG tablet, Take 100 mg by mouth every 2 (two) hours as needed for Migraine., Disp: , Rfl:     trazodone HCl (TRAZODONE ORAL), trazodone  100 mg  at night, Disp: , Rfl:       Review of Systems:  As per HPI otherwise noncontributory    OBJECTIVE:      Vital Signs (Most Recent):  Vitals:    11/30/23 1150   Weight: 47.6 kg (105 lb)   Height: 5' (1.524 m)     Body mass index is 20.51 kg/m².      Physical Exam:  Constitutional: The patient appears well-developed and well-nourished. No distress.   Skin: No lesions appreciated  Head: Normocephalic and atraumatic.   Nose: Nose normal.   Ears: No deformities seen  Eyes: Conjunctivae and EOM are normal.   Neck: No tracheal deviation present.   Cardiovascular: Normal rate and intact distal pulses.    Pulmonary/Chest: Effort normal. No respiratory distress.   Abdominal: There is no guarding.   Neurological: The patient is alert.   Psychiatric: The patient has a normal mood and affect.     Right Hand/Wrist Examination:    Observation/Inspection:  Swelling  none    Deformity  none  Discoloration  none     Scars   extensile revision carpal tunnel, right thumb CMC all well healed    Atrophy  none    HAND/WRIST EXAMINATION:  Finkelstein's Test   Neg  WHAT Test    Neg  Snuff box  tenderness   Neg  Ching's Test    Neg  Hook of Hamate Tenderness  Neg  CMC grind    positive  Circumduction test   Positive  FCR intact  Tender to palpation at the distal scaphoid/STT joint    Neurovascular Exam:  Digits WWP, brisk CR < 3s throughout  NVI motor/LTS to M/R/U nerves, radial pulse 2+ except for hypersensitivity and decreased light touch sensation to the radial sensory distribution of the right dorsal thumb and hand as well as decreased light touch sensation to the right thumb and index finger median nerve distributions at baseline  Tinel's Test - Carpal Tunnel  Neg  Tinel's Test - Cubital Tunnel  Neg  Phalen's Test    Neg  Median Nerve Compression Test Neg    ROM hand full, painless except the right thumb CMC/STT joints    ROM wrist full, painless    ROM elbow full, painless    Abdomen not guarded  Respirations nonlabored  Perfusion intact    Diagnostic Results:     Imaging - I independently viewed the patient's imaging as well as the radiology report.  Xrays of the patient's right hand  demonstrate no evidence of any acute fractures or dislocations with postop changes of the right thumb CMC joint and severe STT arthritis and residual CMC arthritis versus subsidence.    EMG - none    ASSESSMENT/PLAN:      68 y.o. yo female with recurrent/persistent right thumb basal joint pain, STT arthritis, numbness tingling right hand  Plan: The patient and I had a thorough discussion today.  We discussed the working diagnosis as well as several other potential alternative diagnoses.  Treatment options were discussed, both conservative and surgical.  Conservative treatment options would include things such as activity modifications, workplace modifications, a period of rest, oral vs topical OTC and prescription anti-inflammatory medications, occupational therapy, splinting/bracing, immobilization, corticosteroid injections, and others.  Surgical options were discussed as well.     At this time, Saira would like to  proceed with surgical intervention.  This will consist of a revision right thumb CMC arthroplasty with possible LR TI versus suture suspension, possible tendon harvest, distal scaphoid excision with possible tendon interposition and any other indicated procedures at the time of surgery on January 19, 2024 which I feel is reasonable.  I have very clearly instructed her that this would not be expected to improve her numbness and tingling in the right hand and I certainly can not guarantee any results given the revision nature of this and other factors.  She would like to proceed and we will get this scheduled.      The patient has not responded to adequate non operative treatment at this time and/or non operative treatment is not indicated. Thus, the risks, benefits and alternatives to surgery were discussed with the patient in detail.  Specific risks include but are not limited to bleeding, infection, vessel and/or nerve damage, pain, numbness, tingling, compartment syndrome, need for additional surgery, failure to return to pre-injury and/or preoperative functional status, inability to return to work, scar sensitivity, delayed healing, complex regional pain syndrome, weakness, pulley injury, tendon injury, bowstringing, partial and/or incomplete relief of symptoms, persistence of and/or worsening of symptoms, hardware and/or surgical failure, prominent and/or symptomatic hardware possibly necessitating future removal, osteomyelitis, amputation, loss of function, stiffness, rotational malalignment, functional debility, dysfunction, decreased  strength, need for prolonged postoperative rehabilitation, malunion, nonunion, deep venous thrombosis, pulmonary embolism, arthritis and death.  The patient states an understanding and wishes to proceed with surgery.   All questions were answered.  No guarantees were implied or stated.  Written informed consent was obtained.      Hunter Smith M.D.    Please be aware that  this note has been generated with the assistance of [employer] voice-to-text.  Please excuse any spelling or grammatical errors.    Thank you for choosing Dr. Hunter Smith for your orthopedic hand and upper extremity care. It is our goal to provide you with exceptional care that will help keep you healthy, active, and get you back in the game.     If you felt that you received exemplary care today, please consider leaving feedback for Dr. Smith on [employer] at [email or web address].    Please do not hesitate to reach out to us via email, phone, or MyChart with any questions, concerns, or feedback.

## 2024-01-09 NOTE — ANESTHESIA PAT ROS NOTE
Tried calling several times regarding nocturnal oxygen dependence however phone goes to voicemail.  Previous anesthetic was approximately 16 months ago and tolerated well.  According to the post procedure note the patient was discharged within 30 minutes unassisted and on room air.  Unless anything has changed I think that she is OK to come to [facility].  Plan is for regional with minimal sedation or precedex.

## 2024-01-12 ENCOUNTER — TELEPHONE (OUTPATIENT)
Dept: ORTHOPEDICS | Facility: CLINIC | Age: 69
End: 2024-01-12
Payer: OTHER GOVERNMENT

## 2024-01-12 NOTE — TELEPHONE ENCOUNTER
ARGENISM for pt to r/s procedure.    Evon Gold MS, OTC  Clinical & OR Assistant to Dr. Ross Dunbar [facility]  [phone]      ----- Message from Corinne Musa sent at 1/12/2024  1:23 PM CST -----  Regarding: R/S Procedure  Contact: Pt @[phone]  Pt is calling to speak with someone in the office to cancel procedure that is scheduled on   1-19-24   . Pt has advised me that she would like to   r/s . Thanks       Additional Info: Pt would like a c/b today.

## 2024-01-16 ENCOUNTER — TELEPHONE (OUTPATIENT)
Dept: ORTHOPEDICS | Facility: CLINIC | Age: 69
End: 2024-01-16
Payer: OTHER GOVERNMENT

## 2024-01-16 NOTE — TELEPHONE ENCOUNTER
Attempted to contact patient. Her phone does not ring, goes directly to VM. Multiple attempts and VMs left. Arabella message sent.    Evon Gold MS, OTC  Clinical & OR Assistant to Dr. Ross Dunbar [facility]  [phone]      ----- Message from Evon Gold sent at 1/16/2024  4:47 PM CST -----  Regarding: FW: returning call    ----- Message -----  From: Tangela Reardon  Sent: 1/16/2024   2:56 PM CST  To: Sarah Harrison Staff  Subject: returning call                                     Type:  Patient Returning Call    Who Called:MIKE VILLAGRAN [[id number]]    Who Left Message for Patient: Evon     Does the patient know what this is regarding?yes r/s procedure     Best Call Back Number: [phone]    Additional Information:

## 2024-01-16 NOTE — TELEPHONE ENCOUNTER
LVM for pt    Evon Gold MS, OTC  Clinical & OR Assistant to Dr. Ross Dunbar [facility]  [phone]        ----- Message from Addie Cadena MA sent at 1/16/2024  2:20 PM CST -----  Regarding: procedure  Contact: pt at [phone]  Pt is calling to reschedule her procedure appt. No appt in  [facility] pls call pt at [phone]

## 2024-01-29 ENCOUNTER — PATIENT MESSAGE (OUTPATIENT)
Dept: ORTHOPEDICS | Facility: CLINIC | Age: 69
End: 2024-01-29
Payer: OTHER GOVERNMENT

## 2024-02-02 ENCOUNTER — TELEPHONE (OUTPATIENT)
Dept: OPHTHALMOLOGY | Facility: CLINIC | Age: 69
End: 2024-02-02
Payer: OTHER GOVERNMENT

## 2024-02-02 NOTE — TELEPHONE ENCOUNTER
----- Message from Treasure Chaidez sent at 2/2/2024  9:04 AM CST -----  Regarding: referral  Good Morning    Dr. Rivers would like to refer the following patient to  the Ophthalmology department. The patients diagnosis is unspecified esotropia. I have scanned the patients referral and records into .     Thank you,   Treasure Lee

## 2024-02-02 NOTE — TELEPHONE ENCOUNTER
----- Message from Karla Carvajal sent at 2/2/2024 12:18 PM CST -----  Regarding: appt access  Contact: self @ [phone]  Pt says she is returning Vanessa's call concerning an appt.  Pls call.

## 2024-02-05 ENCOUNTER — PATIENT MESSAGE (OUTPATIENT)
Dept: ORTHOPEDICS | Facility: CLINIC | Age: 69
End: 2024-02-05
Payer: OTHER GOVERNMENT

## 2024-02-08 ENCOUNTER — PATIENT MESSAGE (OUTPATIENT)
Dept: ORTHOPEDICS | Facility: CLINIC | Age: 69
End: 2024-02-08
Payer: OTHER GOVERNMENT

## 2024-02-09 ENCOUNTER — TELEPHONE (OUTPATIENT)
Dept: ORTHOPEDICS | Facility: CLINIC | Age: 69
End: 2024-02-09
Payer: OTHER GOVERNMENT

## 2024-02-09 NOTE — TELEPHONE ENCOUNTER
I called patient she did not answer. I was trying to see which Provider she would like an appointment.

## 2024-02-12 ENCOUNTER — TELEPHONE (OUTPATIENT)
Dept: OPTOMETRY | Facility: CLINIC | Age: 69
End: 2024-02-12
Payer: OTHER GOVERNMENT

## 2024-02-13 ENCOUNTER — PATIENT MESSAGE (OUTPATIENT)
Dept: ORTHOPEDICS | Facility: CLINIC | Age: 69
End: 2024-02-13
Payer: OTHER GOVERNMENT

## 2024-02-19 ENCOUNTER — TELEPHONE (OUTPATIENT)
Dept: OPHTHALMOLOGY | Facility: CLINIC | Age: 69
End: 2024-02-19
Payer: OTHER GOVERNMENT

## 2024-02-19 ENCOUNTER — OFFICE VISIT (OUTPATIENT)
Dept: OPHTHALMOLOGY | Facility: CLINIC | Age: 69
End: 2024-02-19
Payer: OTHER GOVERNMENT

## 2024-02-19 DIAGNOSIS — H50.00 ESOTROPIA: Primary | ICD-10-CM

## 2024-02-19 PROCEDURE — 99204 OFFICE O/P NEW MOD 45 MIN: CPT | Mod: S$PBB,,, | Performed by: STUDENT IN AN ORGANIZED HEALTH CARE EDUCATION/TRAINING PROGRAM

## 2024-02-19 PROCEDURE — 92060 SENSORIMOTOR EXAMINATION: CPT | Mod: 26,S$PBB,, | Performed by: STUDENT IN AN ORGANIZED HEALTH CARE EDUCATION/TRAINING PROGRAM

## 2024-02-19 PROCEDURE — 92060 SENSORIMOTOR EXAMINATION: CPT | Mod: PBBFAC | Performed by: STUDENT IN AN ORGANIZED HEALTH CARE EDUCATION/TRAINING PROGRAM

## 2024-02-19 PROCEDURE — 99213 OFFICE O/P EST LOW 20 MIN: CPT | Mod: PBBFAC,25 | Performed by: STUDENT IN AN ORGANIZED HEALTH CARE EDUCATION/TRAINING PROGRAM

## 2024-02-19 PROCEDURE — 99999 PR PBB SHADOW E&M-EST. PATIENT-LVL III: CPT | Mod: PBBFAC,,, | Performed by: STUDENT IN AN ORGANIZED HEALTH CARE EDUCATION/TRAINING PROGRAM

## 2024-02-19 NOTE — PROGRESS NOTES
HPI     Strabismus            Laterality: both eyes    Duration: years    Movement: turning in          Comments    68 yr old presents to clinic for evaluation and treatment of Alternating   esotropia with complaints of diplopia.  Mrs. Osuna states that she had   suddenly noticed esotropia a few years ago. There were no associated   symptoms at the time. MRI and CT of brain done in 2021 that was grossly   normal (ordered for encephalopathy?).   She was seen by Dr. Bailey in 8/17/2023 and given prisms. Diplopia was   controled with prism glasses, but recently stopped helping about a month   ago. Images are side by side and at times images are diagonal, even with   prism glasses on.  Glasses RX is from Dr. Bailey 8/2023    S/p Lasik OU 2000. Reports she was near sighted afterwards.          Last edited by Devan Quevedo MD on 2/19/2024 12:03 PM.        ROS    Negative for: Constitutional, Gastrointestinal, Neurological, Skin,   Genitourinary, Musculoskeletal, HENT, Endocrine, Cardiovascular, Eyes,   Respiratory, Psychiatric, Allergic/Imm, Heme/Lymph  Last edited by Devan Quevedo MD on 2/19/2024  9:04 AM.        Assessment /Plan     For exam results, see Encounter Report.    Esotropia          Problem List Items Addressed This Visit          Ophtho    Esotropia - Primary    Current Assessment & Plan     History of myopia s/p LASIK in 2000  2 years of diplopia and esotropia  Hx of MRI Brain 10/2021 that was normal  Given 3BO prisms OU in 8/2023 but over past month noticing increasing diplopia    2/19/24: Has moderate angle esotropia that builds after prolonged cover test  No significant D:N disparity.   Did PAT in clinic and got 30 ET for distance    Plan:  Unclear etiology of strabismus - likely related to high myopia  Discussed risks and benefits of strabismus surgery, including: pain, bleeding, infection; less attractive appearance, change in eyelid position, decreased vision or loss of vision; under or over  correction, continued or worsened diplopia, need for future surgery  Patient elects to proceed with strabismus surgery  Will plan for bilateral MR recession OU           Devan Quevedo MD  Pediatric Ophthalmology and Adult Strabismus  [facility]

## 2024-02-19 NOTE — ASSESSMENT & PLAN NOTE
History of myopia s/p LASIK in 2000  2 years of diplopia and esotropia  Hx of MRI Brain 10/2021 that was normal  Given 3BO prisms OU in 8/2023 but over past month noticing increasing diplopia    2/19/24: Has moderate angle esotropia that builds after prolonged cover test  No significant D:N disparity.   Did PAT in clinic and got 30 ET for distance    Plan:  Unclear etiology of strabismus - likely related to high myopia  Discussed risks and benefits of strabismus surgery, including: pain, bleeding, infection; less attractive appearance, change in eyelid position, decreased vision or loss of vision; under or over correction, continued or worsened diplopia, need for future surgery  Patient elects to proceed with strabismus surgery  Will plan for bilateral MR recession OU

## 2024-02-20 ENCOUNTER — PATIENT MESSAGE (OUTPATIENT)
Dept: ORTHOPEDICS | Facility: CLINIC | Age: 69
End: 2024-02-20
Payer: OTHER GOVERNMENT

## 2024-02-22 ENCOUNTER — TELEPHONE (OUTPATIENT)
Dept: OPHTHALMOLOGY | Facility: CLINIC | Age: 69
End: 2024-02-22
Payer: OTHER GOVERNMENT

## 2024-02-26 ENCOUNTER — PATIENT MESSAGE (OUTPATIENT)
Dept: ORTHOPEDICS | Facility: CLINIC | Age: 69
End: 2024-02-26
Payer: OTHER GOVERNMENT

## 2024-02-26 ENCOUNTER — TELEPHONE (OUTPATIENT)
Dept: OPHTHALMOLOGY | Facility: CLINIC | Age: 69
End: 2024-02-26
Payer: OTHER GOVERNMENT

## 2024-02-26 DIAGNOSIS — H50.00 ESOTROPIA: Primary | ICD-10-CM

## 2024-03-26 ENCOUNTER — TELEPHONE (OUTPATIENT)
Dept: OPHTHALMOLOGY | Facility: CLINIC | Age: 69
End: 2024-03-26
Payer: OTHER GOVERNMENT

## 2024-03-26 NOTE — TELEPHONE ENCOUNTER
Called to give patient arrival time for surgery w/Sae.    Patient is to arrive at 9:00am  No food or drink after midnight on Wednesday night  Patient is to report to [facility]  No ans M

## 2024-03-27 ENCOUNTER — TELEPHONE (OUTPATIENT)
Dept: OPHTHALMOLOGY | Facility: CLINIC | Age: 69
End: 2024-03-27
Payer: OTHER GOVERNMENT

## 2024-03-27 ENCOUNTER — ANESTHESIA EVENT (OUTPATIENT)
Dept: SURGERY | Facility: HOSPITAL | Age: 69
End: 2024-03-27
Payer: OTHER GOVERNMENT

## 2024-03-28 ENCOUNTER — ANESTHESIA (OUTPATIENT)
Dept: SURGERY | Facility: HOSPITAL | Age: 69
End: 2024-03-28
Payer: OTHER GOVERNMENT

## 2024-03-28 ENCOUNTER — HOSPITAL ENCOUNTER (OUTPATIENT)
Facility: HOSPITAL | Age: 69
Discharge: HOME | End: 2024-03-28
Attending: STUDENT IN AN ORGANIZED HEALTH CARE EDUCATION/TRAINING PROGRAM | Admitting: STUDENT IN AN ORGANIZED HEALTH CARE EDUCATION/TRAINING PROGRAM
Payer: OTHER GOVERNMENT

## 2024-03-28 VITALS
HEART RATE: 78 BPM | OXYGEN SATURATION: 100 % | DIASTOLIC BLOOD PRESSURE: 61 MMHG | SYSTOLIC BLOOD PRESSURE: 113 MMHG | TEMPERATURE: 98 F | RESPIRATION RATE: 20 BRPM

## 2024-03-28 DIAGNOSIS — H50.00 ESOTROPIA OF BOTH EYES: ICD-10-CM

## 2024-03-28 DIAGNOSIS — H50.00 ESOTROPIA: Primary | ICD-10-CM

## 2024-03-28 PROCEDURE — 25000003 PHARM REV CODE 250: Performed by: NURSE ANESTHETIST, CERTIFIED REGISTERED

## 2024-03-28 PROCEDURE — D9220A PRA ANESTHESIA: Mod: CRNA,,, | Performed by: NURSE ANESTHETIST, CERTIFIED REGISTERED

## 2024-03-28 PROCEDURE — 36000707: Performed by: STUDENT IN AN ORGANIZED HEALTH CARE EDUCATION/TRAINING PROGRAM

## 2024-03-28 PROCEDURE — 37000009 HC ANESTHESIA EA ADD 15 MINS: Performed by: STUDENT IN AN ORGANIZED HEALTH CARE EDUCATION/TRAINING PROGRAM

## 2024-03-28 PROCEDURE — 63600175 PHARM REV CODE 636 W HCPCS: Performed by: NURSE ANESTHETIST, CERTIFIED REGISTERED

## 2024-03-28 PROCEDURE — 37000008 HC ANESTHESIA 1ST 15 MINUTES: Performed by: STUDENT IN AN ORGANIZED HEALTH CARE EDUCATION/TRAINING PROGRAM

## 2024-03-28 PROCEDURE — 99499 UNLISTED E&M SERVICE: CPT | Mod: ,,, | Performed by: OPHTHALMOLOGY

## 2024-03-28 PROCEDURE — 67311 REVISE EYE MUSCLE: CPT | Mod: 50,,, | Performed by: STUDENT IN AN ORGANIZED HEALTH CARE EDUCATION/TRAINING PROGRAM

## 2024-03-28 PROCEDURE — 71000015 HC POSTOP RECOV 1ST HR: Performed by: STUDENT IN AN ORGANIZED HEALTH CARE EDUCATION/TRAINING PROGRAM

## 2024-03-28 PROCEDURE — 71000044 HC DOSC ROUTINE RECOVERY FIRST HOUR: Performed by: STUDENT IN AN ORGANIZED HEALTH CARE EDUCATION/TRAINING PROGRAM

## 2024-03-28 PROCEDURE — 25000003 PHARM REV CODE 250: Performed by: STUDENT IN AN ORGANIZED HEALTH CARE EDUCATION/TRAINING PROGRAM

## 2024-03-28 PROCEDURE — D9220A PRA ANESTHESIA: Mod: ANES,,, | Performed by: STUDENT IN AN ORGANIZED HEALTH CARE EDUCATION/TRAINING PROGRAM

## 2024-03-28 PROCEDURE — 25000003 PHARM REV CODE 250

## 2024-03-28 PROCEDURE — 36000706: Performed by: STUDENT IN AN ORGANIZED HEALTH CARE EDUCATION/TRAINING PROGRAM

## 2024-03-28 RX ORDER — PHENYLEPHRINE HYDROCHLORIDE 25 MG/ML
SOLUTION/ DROPS OPHTHALMIC
Status: DISCONTINUED
Start: 2024-03-28 | End: 2024-03-28 | Stop reason: HOSPADM

## 2024-03-28 RX ORDER — HYDROMORPHONE HYDROCHLORIDE 1 MG/ML
0.2 INJECTION, SOLUTION INTRAMUSCULAR; INTRAVENOUS; SUBCUTANEOUS EVERY 5 MIN PRN
Status: DISCONTINUED | OUTPATIENT
Start: 2024-03-28 | End: 2024-03-28 | Stop reason: HOSPADM

## 2024-03-28 RX ORDER — HYDROCODONE BITARTRATE AND ACETAMINOPHEN 5; 325 MG/1; MG/1
1 TABLET ORAL EVERY 6 HOURS PRN
Qty: 10 TABLET | Refills: 0 | Status: SHIPPED | OUTPATIENT
Start: 2024-03-28 | End: 2024-05-23 | Stop reason: HOSPADM

## 2024-03-28 RX ORDER — DEXAMETHASONE SODIUM PHOSPHATE 4 MG/ML
INJECTION, SOLUTION INTRA-ARTICULAR; INTRALESIONAL; INTRAMUSCULAR; INTRAVENOUS; SOFT TISSUE
Status: DISCONTINUED | OUTPATIENT
Start: 2024-03-28 | End: 2024-03-28

## 2024-03-28 RX ORDER — PROCHLORPERAZINE EDISYLATE 5 MG/ML
5 INJECTION INTRAMUSCULAR; INTRAVENOUS EVERY 30 MIN PRN
Status: DISCONTINUED | OUTPATIENT
Start: 2024-03-28 | End: 2024-03-28 | Stop reason: HOSPADM

## 2024-03-28 RX ORDER — FENTANYL CITRATE 50 UG/ML
INJECTION, SOLUTION INTRAMUSCULAR; INTRAVENOUS
Status: DISCONTINUED | OUTPATIENT
Start: 2024-03-28 | End: 2024-03-28

## 2024-03-28 RX ORDER — PHENYLEPHRINE HYDROCHLORIDE 100 MG/ML
SOLUTION/ DROPS OPHTHALMIC
Status: DISCONTINUED
Start: 2024-03-28 | End: 2024-03-28 | Stop reason: WASHOUT

## 2024-03-28 RX ORDER — LIDOCAINE HYDROCHLORIDE 20 MG/ML
INJECTION INTRAVENOUS
Status: DISCONTINUED | OUTPATIENT
Start: 2024-03-28 | End: 2024-03-28

## 2024-03-28 RX ORDER — PHENYLEPHRINE HYDROCHLORIDE 25 MG/ML
SOLUTION/ DROPS OPHTHALMIC
Status: DISCONTINUED | OUTPATIENT
Start: 2024-03-28 | End: 2024-03-28 | Stop reason: HOSPADM

## 2024-03-28 RX ORDER — IBUPROFEN 400 MG/1
400 TABLET ORAL EVERY 6 HOURS PRN
Status: DISCONTINUED | OUTPATIENT
Start: 2024-03-28 | End: 2024-03-28 | Stop reason: HOSPADM

## 2024-03-28 RX ORDER — ONDANSETRON HYDROCHLORIDE 2 MG/ML
INJECTION, SOLUTION INTRAVENOUS
Status: DISCONTINUED | OUTPATIENT
Start: 2024-03-28 | End: 2024-03-28

## 2024-03-28 RX ORDER — PHENYLEPHRINE HYDROCHLORIDE 10 MG/ML
INJECTION INTRAVENOUS
Status: DISCONTINUED | OUTPATIENT
Start: 2024-03-28 | End: 2024-03-28

## 2024-03-28 RX ORDER — FENTANYL CITRATE 50 UG/ML
25 INJECTION, SOLUTION INTRAMUSCULAR; INTRAVENOUS EVERY 5 MIN PRN
Status: DISCONTINUED | OUTPATIENT
Start: 2024-03-28 | End: 2024-03-28 | Stop reason: HOSPADM

## 2024-03-28 RX ORDER — PROPOFOL 10 MG/ML
VIAL (ML) INTRAVENOUS
Status: DISCONTINUED | OUTPATIENT
Start: 2024-03-28 | End: 2024-03-28

## 2024-03-28 RX ORDER — NEOMYCIN SULFATE, POLYMYXIN B SULFATE, AND DEXAMETHASONE 3.5; 10000; 1 MG/G; [USP'U]/G; MG/G
OINTMENT OPHTHALMIC
Status: DISCONTINUED | OUTPATIENT
Start: 2024-03-28 | End: 2024-03-28 | Stop reason: HOSPADM

## 2024-03-28 RX ORDER — MIDAZOLAM HYDROCHLORIDE 1 MG/ML
INJECTION INTRAMUSCULAR; INTRAVENOUS
Status: DISCONTINUED | OUTPATIENT
Start: 2024-03-28 | End: 2024-03-28

## 2024-03-28 RX ORDER — NEOMYCIN SULFATE, POLYMYXIN B SULFATE, AND DEXAMETHASONE 3.5; 10000; 1 MG/G; [USP'U]/G; MG/G
OINTMENT OPHTHALMIC
Status: DISCONTINUED
Start: 2024-03-28 | End: 2024-03-28 | Stop reason: HOSPADM

## 2024-03-28 RX ADMIN — MIDAZOLAM HYDROCHLORIDE 1 MG: 2 INJECTION, SOLUTION INTRAMUSCULAR; INTRAVENOUS at 10:03

## 2024-03-28 RX ADMIN — IBUPROFEN 400 MG: 400 TABLET ORAL at 11:03

## 2024-03-28 RX ADMIN — LIDOCAINE HYDROCHLORIDE 50 MG: 20 INJECTION INTRAVENOUS at 10:03

## 2024-03-28 RX ADMIN — DEXAMETHASONE SODIUM PHOSPHATE 4 MG: 4 INJECTION, SOLUTION INTRAMUSCULAR; INTRAVENOUS at 10:03

## 2024-03-28 RX ADMIN — SODIUM CHLORIDE: 0.9 INJECTION, SOLUTION INTRAVENOUS at 10:03

## 2024-03-28 RX ADMIN — FENTANYL CITRATE 25 MCG: 50 INJECTION, SOLUTION INTRAMUSCULAR; INTRAVENOUS at 10:03

## 2024-03-28 RX ADMIN — PHENYLEPHRINE HYDROCHLORIDE 100 MCG: 10 INJECTION INTRAVENOUS at 10:03

## 2024-03-28 RX ADMIN — PROPOFOL 100 MG: 10 INJECTION, EMULSION INTRAVENOUS at 10:03

## 2024-03-28 RX ADMIN — GLYCOPYRROLATE 0.2 MG: 0.2 INJECTION, SOLUTION INTRAMUSCULAR; INTRAVENOUS at 10:03

## 2024-03-28 RX ADMIN — ONDANSETRON 4 MG: 2 INJECTION INTRAMUSCULAR; INTRAVENOUS at 10:03

## 2024-03-28 NOTE — H&P
Ophthalmology History and Physical     Patient Name:  Saira Osuna  MRN:  [id number]  :  1955    Allergies:  Penicillins    CC:  Here for Surgery    HPI:  Patient presenting for strabismus surgery.    Interval History: No significant changes to past medical history, allergies, or medications.    ROS:  No fevers, chills, chest pain, shortness of breath, nausea or emesis         Past Medical History:   Diagnosis Date    Constipation     Dependence on nocturnal oxygen therapy     2 liters/ nasal cannula    Depression     Emphysema lung     Encounter for blood transfusion     GERD (gastroesophageal reflux disease)     Migraines     Neck pain      Family History   Problem Relation Age of Onset    Breast cancer Maternal Aunt     Colon cancer Neg Hx     Colon polyps Neg Hx     Crohn's disease Neg Hx     Ulcerative colitis Neg Hx     Stomach cancer Neg Hx     Rectal cancer Neg Hx      Past Surgical History:   Procedure Laterality Date    AUGMENTATION OF BREAST      CERVICAL FUSION      COLONOSCOPY      COLONOSCOPY N/A 2022    Procedure: COLONOSCOPY;  Surgeon: Joni Burnett MD;  Location: [facility];  Service: Endoscopy;  Laterality: N/A;    COLONOSCOPY N/A 2022    Procedure: COLONOSCOPY;  Surgeon: Joni Burnett MD;  Location: [facility];  Service: Endoscopy;  Laterality: N/A;    ENDOSCOPIC RELEASE OF BOTH CARPAL TUNNELS      2 carpal surgeries on right, one on left    ESOPHAGEAL DILATION      ESOPHAGOGASTRODUODENOSCOPY N/A 2022    Procedure: EGD (ESOPHAGOGASTRODUODENOSCOPY);  Surgeon: Joni Burnett MD;  Location: [facility];  Service: Endoscopy;  Laterality: N/A;    INTERPOSITION ARTHROPLASTY OF CARPOMETACARPAL JOINTS Right     thumb    TONSILLECTOMY      TOTAL HIP ARTHROPLASTY Bilateral     UPPER GASTROINTESTINAL ENDOSCOPY         Medications marked as taking:  [unfilled]    Vital Signs:  There were no vitals taken for this visit.    Ophthalmology Exam:  Per last ophthalmology clinic  note    Heart Exam: As per anesthesia    Lung Exam:  As per anesthesia    Assessment and Plan:     Saira Osuna is a 68 y.o. female presenting for strabismus surgery, both eye    -Written consent present   -Plan to proceed to OR as planned      Devan Quevedo MD  Pediatric Ophthalmology and Adult Strabismus  [facility]

## 2024-03-28 NOTE — TRANSFER OF CARE
Anesthesia Transfer of Care Note    Patient: Saira Osuna    Procedure(s) Performed: Procedure(s) (LRB):  STRABISMUS SURGERY (Bilateral)    Patient location: PACU    Anesthesia Type: general    Transport from OR: Transported from OR on room air with adequate spontaneous ventilation    Post pain: adequate analgesia    Post assessment: no apparent anesthetic complications and tolerated procedure well    Post vital signs: stable    Level of consciousness: awake and alert    Nausea/Vomiting: no nausea/vomiting    Complications: none    Transfer of care protocol was followed      Last vitals: Visit Vitals  /68 (BP Location: Left arm, Patient Position: Sitting)   Pulse 74   Temp 36.6 °C (97.8 °F) (Oral)   Resp 16   SpO2 95%   Breastfeeding No

## 2024-03-28 NOTE — PROGRESS NOTES
Pt ready for discharge.  Waiting on pharmacy to deliver home medications.  Pt's daughter at bedside

## 2024-03-28 NOTE — OP NOTE
Patient Name: Saira Osuna  Date of Surgery: 3/28/24  Medical Record Number: [id number]  : 1955    Surgeon: Devan Quevedo MD  Assistant: Chavo Mcclain MD  *No qualified resident was available to assist with the procedure    Pre-op Diagnosis:  1. Alternating esotropia, both eyes    Post-op Diagnosis:  1. Alternating esotropia, both eyes    Procedure:  1. Bilateral medial rectus recession of 5 mm (CPT 82818-35)    Anesthesia: General  Complications: none    INDICATION OF PROCEDURE  Saira Osuna is a 68 y.o. female with history of esotropia. The strabismus has become progressively more difficult to control. The patient was identified in the main operating room holding area. The patient's parents were advised of the risks and benefits of surgical intervention, elected to proceed, and signed an informed consent document.    DESCRIPTION OF PROCEDURE:  The patient was identified in the preoperative holding area and brought to the operating room where anesthesia monitoring was established and general anesthesia induced in the supine position. The area about both eyes was then prepped and draped in the usual sterile fashion. A drop of 2.5% phenylephrine was applied to each eye.    Attention was turned to the right eye and a speculum was placed. The eye was positioned in abduction with a penny forcep held on the temporal limbus A 90 degree conjunctival peritomy was created nasally. Dissection was carried out in the superior nasal and inferior nasal quadrants to reveal bare sclera. A Zaman hook, followed by a Green hook, were used to engage the right medial rectus muscle. Overlying Tenon's attachments were severed with Pipo scissors. A double-armed suture of 6-0 vicryl was passed through the muscle tendon first with a central bite, then near its insertion with locking bites at both poles. The muscle was then severed from the globe with Pipo scissors. Locking forceps were applied to both poles of the original  muscle insertion and the globe positioned in abduction. The vicryl suture arms were passed at one-half scleral depth 5 mm posterior to the original muscle insertion as measured with calipers. The muscle was tied down to the globe and found to be stable in its new position.  The conjunctiva was closed with interrupted sutures of 6-0 plain gut.    Attention was turned to the left eye, where an identical left medial rectus recession of 5 mm as described above was performed. The eyelid speculum was then removed. A drop of dilute Betadine solution was placed in both eyes followed by Maxitrol ophthalmic ointment. The patient was awakened from anesthesia and taken to the postoperative recovery area in stable condition, having tolerated the procedure well.    Dr. Quevedo was present for and scrubbed for the entire case.

## 2024-03-28 NOTE — ANESTHESIA PROCEDURE NOTES
Intubation    Date/Time: 3/28/2024 10:11 AM    Performed by: Robert Damon CRNA  Authorized by: Kimberley Badillo MD    Intubation:     Induction:  Intravenous    Intubated:  Postinduction    Mask Ventilation:  Easy mask    Attempts:  1    Attempted By:  CRNA    Difficult Airway Encountered?: No      Complications:  None    Airway Device:  Supraglottic airway/LMA    Airway Device Size:  3.0    Secured at:  The lips    Placement Verified By:  Capnometry    Complicating Factors:  None    Findings Post-Intubation:  BS equal bilateral and atraumatic/condition of teeth unchanged

## 2024-03-28 NOTE — DISCHARGE INSTRUCTIONS
ACTIVITY LEVEL:  If you received sedation or an anesthetic, you may feel sleepy for several hours. Rest until you are more awake. Gradually resume your normal activities in two days. Children may return to school in 2-3 days. It is all right to watch television or to read. Swimming is permitted in two weeks.    CARE OF INCISION:  A blood-tinged discharge from the eye is normal. This can be gently washed away with a clean, damp wash cloth. Do not use water, gauze, or cotton to wipe the lids. The morning after surgery, you may have difficulty opening your eyes. This is normal. If dry blood or secretions are holding the lids together, you may open the eyes by gently  the lids from above and below. Please wash your hands thoroughly before doing this. If the lids dont open, do not force them apart. (A child will cry and the tears will soften the secretions and a parent can try again later.) Use cool compresses to the eyes for 24 hours, if tolerated for comfort. Do not place any medication in the eye unless otherwise instructed.    BATHING:  Keep your face out of water for five days after surgery - NO SHOWERS.    DIET:  At home, continue with liquids, and if there is no nausea, you may eat a soft diet. Gradually resume your  normal diet.    PAIN:  If needed for discomfort, you can use cold compresses and take Tylenol (usual recommended dose) every four  hours. Generally, do not take Tylenol more than four times a day.    WHEN TO CALL THE DOCTOR:   Any increase in the amount of swelling of the eyes and adjacent tissues   Heavy yellow discharge from the eyes   Fever over 101ºF (38.4ºC)    A purple discoloration of the lower lids is common. It appears a few days after surgery and does not affect healing. You may experience double vision after surgery. This is normal and will disappear in a few days or weeks. Prescription glasses may be worn unless otherwise instructed. The eyes may be unusually sensitive to light  for several days. Dark sunglasses will help.    FOR EMERGENCIES:  If any unusual problems or difficulties occur, contact Dr. Quevedo or the resident at [phone] (page ) or at the Clinic office, [phone].

## 2024-03-28 NOTE — DISCHARGE SUMMARY
[facility] (1st Fl)  Discharge Note  Short Stay    Procedure(s) (LRB):  STRABISMUS SURGERY (Bilateral)      Discharge Summary  Ophthalmology Service      Admit Date: 3/28/24    Discharge Date: 3/28/24    Attending Physician: GEREMIAS Quevedo MD     Discharge Physician: CHINEDU Mcclain Jr., MD    Discharged Condition: Good    Reason for Admission: Esotropia [H50.00]  Esotropia of both eyes [H50.00]     Treatments/Procedures: Strabismus Surgery (see dictated report for details).    Hospital Course: Stable, dictated    Consults: None    Significant Diagnostic Studies: None    Disposition: Home    Patient Instructions:   - Resume same diet as prior to surgery  - Resume activity as tolerated with no swimming for 1 week  - Apply ice packs to surgical eye(s) for 72 hours as tolerated  - Call the Ophthalmology clinic to schedule an appointment with Dr. Quevedo in 4 week(s).    Patient Instructions:   Current Discharge Medication List        CONTINUE these medications which have NOT CHANGED    Details   aspirin (ECOTRIN) 81 MG EC tablet Take 81 mg by mouth once daily.      buPROPion (WELLBUTRIN XL) 150 MG TB24 tablet TAKE THREE TABLETS BY MOUTH EVERY DAY FOR DEPRESSION      calcium carbonate-vitamin D3 250-125 mg 250 mg-3.125 mcg (125 unit) Tab TAKE 2 TABLETS BY MOUTH EVERY DAY AS A MINERAL SUPPLEMENT      cyanocobalamin (VITAMIN B-12) 1000 MCG tablet TAKE ONE TABLET BY MOUTH EVERY DAY FOR VITAMIN DEFICIENCIES      ferrous sulfate (FEOSOL) 325 mg (65 mg iron) Tab tablet Iron (ferrous sulfate) 325 mg (65 mg iron) tablet   Take 1 tablet every other day by oral route.      LIDOcaine (XYLOCAINE) 5 % Oint ointment Apply topically.      methocarbamoL (ROBAXIN) 500 MG Tab TAKE ONE TABLET BY MOUTH THREE TIMES A DAY IF NEEDED (MUSCLE RELAXANT) (START WITH 1/2 TABLET AT BEDTIME THEN ADD IN MORNING DOSE AND IF  TOLERATED THEN TAKE 1/2 TABLET THREE TIMES A DAY FOR A FEW DAYS AND THEN  CAN INCREASE TO A FULL TABLET IF NO SIDE EFFECTS)  (MUSCLE RELAXANT)  (START WITH 1/2 TABLET AT BEDTIME THEN ADD IN MORNING DOSE AND IF   TOLERATED THEN TAKE 1/2 TABLET THREE TIMES A DAY FOR A FEW DAYS AND THEN   CAN INCREASE TO A FULL TABLET IF NO SIDE EFFECTS)      omeprazole (PRILOSEC) 40 MG capsule omeprazole 40 mg capsule,delayed release   Take 1 capsule every day by oral route.      sumatriptan (IMITREX) 100 MG tablet Take 100 mg by mouth every 2 (two) hours as needed for Migraine.      trazodone HCl (TRAZODONE ORAL) trazodone   100 mg  at night      acetaminophen (TYLENOL) 325 MG tablet Take 325 mg by mouth.      bisacodyL (DULCOLAX) 5 mg EC tablet Dulcolax (bisacodyl) 5 mg tablet,delayed release   take 2 (5mg) tablets as needed if no bowel movement for 2-3 days      !! hydrOXYzine (ATARAX) 50 MG tablet TAKE ONE TABLET BY MOUTH THREE TIMES A DAY AS NEEDED FOR ALLERGIES/ITCHING ANXIETY      !! hydrOXYzine (ATARAX) 50 MG tablet Take 50 mg by mouth.      omeprazole (PRILOSEC OTC) 20 MG tablet Take 20 mg by mouth.      polyethylene glycol (GLYCOLAX) 17 gram/dose powder Miralax 17 gram/dose oral powder   1 capful daily at bedtime       !! - Potential duplicate medications found. Please discuss with provider.          No discharge procedures on file.

## 2024-03-28 NOTE — ANESTHESIA PREPROCEDURE EVALUATION
"             Pre-operative evaluation for Procedure(s) (LRB):  STRABISMUS SURGERY (Bilateral)    Saira Osuna is a 68 y.o. female w/ hx of GERD, emphysema (?nocturnal O2 dependence), migraines, and esotropia who presents for above procedure.       Prev airway: none on record      2D Echo: none on record      EKG: none on record        Patient Active Problem List   Diagnosis    Encephalopathy acute    Hyponatremia    Memory deficit    Esotropia       Review of patient's allergies indicates:   Allergen Reactions    Penicillins Hives       Past Surgical History:   Procedure Laterality Date    AUGMENTATION OF BREAST      CERVICAL FUSION      COLONOSCOPY      COLONOSCOPY N/A 6/2/2022    Procedure: COLONOSCOPY;  Surgeon: Joni Burnett MD;  Location: [facility] ENDO;  Service: Endoscopy;  Laterality: N/A;    COLONOSCOPY N/A 9/30/2022    Procedure: COLONOSCOPY;  Surgeon: Joni Burnett MD;  Location: [facility] ENDO;  Service: Endoscopy;  Laterality: N/A;    ENDOSCOPIC RELEASE OF BOTH CARPAL TUNNELS      2 carpal surgeries on right, one on left    ESOPHAGEAL DILATION      ESOPHAGOGASTRODUODENOSCOPY N/A 02/08/2022    Procedure: EGD (ESOPHAGOGASTRODUODENOSCOPY);  Surgeon: Joni Burnett MD;  Location: [facility] ENDO;  Service: Endoscopy;  Laterality: N/A;    INTERPOSITION ARTHROPLASTY OF CARPOMETACARPAL JOINTS Right     thumb    TONSILLECTOMY      TOTAL HIP ARTHROPLASTY Bilateral     UPPER GASTROINTESTINAL ENDOSCOPY           Vital Signs:         CBC: No results for input(s): "WBC", "RBC", "HGB", "HCT", "PLT", "MCV", "MCH", "MCHC" in the last 72 hours.    CMP: No results for input(s): "NA", "K", "CL", "CO2", "BUN", "CREATININE", "GLU", "MG", "PHOS", "CALCIUM", "ALBUMIN", "PROT", "ALKPHOS", "ALT", "AST", "BILITOT" in the last 72 hours.    INR  No results for input(s): "PT", "INR", "PROTIME", "APTT" in the last 72 hours.        Pre-op Assessment    I have reviewed the Patient Summary Reports.     I have reviewed the Nursing Notes. I have " reviewed the NPO Status.   I have reviewed the Medications.     Review of Systems  Anesthesia Hx:  No problems with previous Anesthesia             Denies Family Hx of Anesthesia complications.    Denies Personal Hx of Anesthesia complications.                    Hematology/Oncology:    Oncology Normal                                   Cardiovascular:  Cardiovascular Normal    Denies Pacemaker.   Denies Valvular problems/Murmurs.   Denies CABG/stent.                                     Pulmonary:   COPD                     Renal/:  Renal/ Normal                 Hepatic/GI:     GERD             Neurological:    Denies CVA.   Headaches                                 Endocrine:  Endocrine Normal            Psych:  Psychiatric History                  Physical Exam  General: Alert and Oriented    Airway:  Mallampati: II   Mouth Opening: Normal  TM Distance: Normal  Tongue: Normal    Dental:  Dentures    Chest/Lungs:  Clear to auscultation, Normal Respiratory Rate    Heart:  Rate: Normal  Rhythm: Regular Rhythm        Anesthesia Plan  Type of Anesthesia, risks & benefits discussed:    Anesthesia Type: Gen Supraglottic Airway  Intra-op Monitoring Plan: Standard ASA Monitors  Post Op Pain Control Plan: IV/PO Opioids PRN and multimodal analgesia  Induction:  IV  Airway Plan: Direct  Informed Consent: Informed consent signed with the Patient and all parties understand the risks and agree with anesthesia plan.  All questions answered.   ASA Score: 3  Day of Surgery Review of History & Physical: H&P Update referred to the surgeon/provider.    Ready For Surgery From Anesthesia Perspective.     .

## 2024-03-28 NOTE — ANESTHESIA POSTPROCEDURE EVALUATION
Anesthesia Post Evaluation    Patient: Saira Osuna    Procedure(s) Performed: Procedure(s) (LRB):  STRABISMUS SURGERY (Bilateral)    Final Anesthesia Type: general      Patient location during evaluation: PACU  Patient participation: Yes- Able to Participate  Level of consciousness: awake  Post-procedure vital signs: reviewed and stable  Pain management: adequate  Airway patency: patent    PONV status at discharge: No PONV  Anesthetic complications: no      Cardiovascular status: blood pressure returned to baseline  Respiratory status: unassisted, spontaneous ventilation and room air                Vitals Value Taken Time   /56 03/28/24 1131   Temp 36.7 °C (98.1 °F) 03/28/24 1115   Pulse 77 03/28/24 1132   Resp 20 03/28/24 1132   SpO2 91 % 03/28/24 1132   Vitals shown include unvalidated device data.      No case tracking events are documented in the log.      Pain/Alfredo Score: Alfredo Score: 10 (3/28/2024 11:15 AM)

## 2024-03-29 ENCOUNTER — TELEPHONE (OUTPATIENT)
Dept: OPHTHALMOLOGY | Facility: CLINIC | Age: 69
End: 2024-03-29
Payer: OTHER GOVERNMENT

## 2024-03-29 NOTE — TELEPHONE ENCOUNTER
Called patient for post op day 1 phone call check. No answer. Left message and asked to call back if any concerns.     Devan Quevedo MD  Pediatric Ophthalmology and Adult Strabismus  [facility]

## 2024-04-03 ENCOUNTER — HOSPITAL ENCOUNTER (EMERGENCY)
Facility: HOSPITAL | Age: 69
Discharge: HOME | End: 2024-04-03
Attending: EMERGENCY MEDICINE
Payer: OTHER GOVERNMENT

## 2024-04-03 VITALS
SYSTOLIC BLOOD PRESSURE: 114 MMHG | RESPIRATION RATE: 16 BRPM | HEIGHT: 60 IN | OXYGEN SATURATION: 97 % | HEART RATE: 72 BPM | TEMPERATURE: 99 F | DIASTOLIC BLOOD PRESSURE: 68 MMHG | BODY MASS INDEX: 21.2 KG/M2 | WEIGHT: 108 LBS

## 2024-04-03 DIAGNOSIS — S80.02XA CONTUSION OF LEFT KNEE, INITIAL ENCOUNTER: Primary | ICD-10-CM

## 2024-04-03 DIAGNOSIS — M25.562 KNEE PAIN, LEFT: ICD-10-CM

## 2024-04-03 PROCEDURE — 25000003 PHARM REV CODE 250: Performed by: NURSE PRACTITIONER

## 2024-04-03 PROCEDURE — 99283 EMERGENCY DEPT VISIT LOW MDM: CPT | Mod: 25

## 2024-04-03 RX ORDER — DICLOFENAC SODIUM 10 MG/G
2 GEL TOPICAL DAILY
Qty: 200 G | Refills: 0 | Status: SHIPPED | OUTPATIENT
Start: 2024-04-03

## 2024-04-03 RX ORDER — ACETAMINOPHEN 325 MG/1
650 TABLET ORAL
Status: COMPLETED | OUTPATIENT
Start: 2024-04-03 | End: 2024-04-03

## 2024-04-03 RX ADMIN — ACETAMINOPHEN 650 MG: 325 TABLET ORAL at 07:04

## 2024-04-04 NOTE — FIRST PROVIDER EVALUATION
Emergency Department TeleTriage Encounter Note      CHIEF COMPLAINT    Chief Complaint   Patient presents with    Knee Pain     Fell on left knee Monday night, ambulatory but concerned about swelling and pain behind knee cap.        VITAL SIGNS   Initial Vitals [04/03/24 1910]   BP Pulse Resp Temp SpO2   (!) 116/53 69 16 -- 95 %      MAP       --            ALLERGIES    Review of patient's allergies indicates:   Allergen Reactions    Penicillins Hives       PROVIDER TRIAGE NOTE  This is a teletriage evaluation of a 68 y.o. female presenting to the ED with c/o left knee pain following a fall. Limited physical exam via telehealth: The patient is awake, alert, answering questions appropriately and is not in respiratory distress. Initial orders will be placed and care will be transferred to an alternate provider when patient is roomed for a full evaluation. Any additional orders and the final disposition will be determined by that provider.         ORDERS  Labs Reviewed - No data to display    ED Orders (720h ago, onward)      Start Ordered     Status Ordering Provider    04/03/24 1915 04/03/24 1913  acetaminophen tablet 650 mg  ED 1 Time         Ordered OBINNA BLACKMON    04/03/24 1914 04/03/24 1913  Check temperature  Once         Ordered OBINNA BLACKMON    04/03/24 1914 04/03/24 1913  X-Ray Knee 3 View Left  1 time imaging         Ordered OBINNA BLACKMON              Virtual Visit Note: The provider triage portion of this emergency department evaluation and documentation was performed via trinket, a HIPAA-compliant telemedicine application, in concert with a tele-presenter in the room. A face to face patient evaluation with one of my colleagues will occur once the patient is placed in an emergency department room.      DISCLAIMER: This note was prepared with [employer] voice recognition transcription software. Garbled syntax, mangled pronouns, and other bizarre constructions may be attributed to that software  system.

## 2024-04-04 NOTE — ED NOTES
Upon discharge, patient is AAOx4, no cardiac or respiratory complications. Follow up care and  Medications have been reviewed with patient and has been instructed to return to the ER if needed. Patient verbalized understanding and ambulated to the lobby without difficulty. PILI BONNER.

## 2024-04-04 NOTE — ED NOTES
Saira Ousna presents to the ED with c/o pain to left knee after a fall on Monday. She denies hitting her head. No abrasions noted to her skin. Patient is concerned for swelling behind her knee cap. + sensation. Patient ambulates well but reports some discomfort when bearing weight. Mucous membranes are pink and moist. Skin is warm, dry and intact. KAILEY VSS  Verified patient's name and date of birth.

## 2024-04-04 NOTE — ED PROVIDER NOTES
Encounter Date: 4/3/2024       History     Chief Complaint   Patient presents with    Knee Pain     Fell on left knee Monday night, ambulatory but concerned about swelling and pain behind knee cap.      Patient is a 68 y.o. female who presents to the ED 04/03/2024 with a chief complaint of left knee pain.  Patient states on Monday of this week she was working in the garden when she had her hands full and stumbled landing on her left knee and right arm.  States her arm is okay she did not hit her head or lose consciousness and she denies any neck pain however her left knee continues to bother her.  She states it feels swollen.  She states she has pain in the front and back of the knee.  She states she is able able to bend it and walk on it but that it is stiff in the morning.  She has a history of GERD, depression, migraines.             Review of patient's allergies indicates:   Allergen Reactions    Penicillins Hives     Past Medical History:   Diagnosis Date    Constipation     Dependence on nocturnal oxygen therapy     2 liters/ nasal cannula    Depression     Emphysema lung     Encounter for blood transfusion     GERD (gastroesophageal reflux disease)     Migraines     Neck pain      Past Surgical History:   Procedure Laterality Date    AUGMENTATION OF BREAST      CERVICAL FUSION      COLONOSCOPY      COLONOSCOPY N/A 6/2/2022    Procedure: COLONOSCOPY;  Surgeon: Joni Burnett MD;  Location: [facility];  Service: Endoscopy;  Laterality: N/A;    COLONOSCOPY N/A 9/30/2022    Procedure: COLONOSCOPY;  Surgeon: Joni Burnett MD;  Location: [facility];  Service: Endoscopy;  Laterality: N/A;    ENDOSCOPIC RELEASE OF BOTH CARPAL TUNNELS      2 carpal surgeries on right, one on left    ESOPHAGEAL DILATION      ESOPHAGOGASTRODUODENOSCOPY N/A 02/08/2022    Procedure: EGD (ESOPHAGOGASTRODUODENOSCOPY);  Surgeon: Joni Burnett MD;  Location: [facility];  Service: Endoscopy;  Laterality: N/A;    INTERPOSITION  ARTHROPLASTY OF CARPOMETACARPAL JOINTS Right     thumb    STRABISMUS SURGERY Bilateral 3/28/2024    Procedure: STRABISMUS SURGERY;  Surgeon: Devan Quevedo MD;  Location: [facility] OR [address] [place] [address];  Service: Ophthalmology;  Laterality: Bilateral;    TONSILLECTOMY      TOTAL HIP ARTHROPLASTY Bilateral     UPPER GASTROINTESTINAL ENDOSCOPY       Family History   Problem Relation Age of Onset    Breast cancer Maternal Aunt     Colon cancer Neg Hx     Colon polyps Neg Hx     Crohn's disease Neg Hx     Ulcerative colitis Neg Hx     Stomach cancer Neg Hx     Rectal cancer Neg Hx      Social History     Tobacco Use    Smoking status: Former     Current packs/day: 0.00     Types: Cigarettes     Quit date: 1990     Years since quittin.2    Smokeless tobacco: Never   Substance Use Topics    Alcohol use: Yes     Comment: rarely    Drug use: Never     Review of Systems   Constitutional:  Negative for chills and fever.   HENT:  Negative for sore throat.    Respiratory:  Negative for chest tightness and shortness of breath.    Cardiovascular:  Negative for chest pain.   Gastrointestinal:  Negative for abdominal pain.   Genitourinary:  Negative for dysuria.   Musculoskeletal:  Positive for arthralgias. Negative for back pain, gait problem, myalgias and neck pain.   Skin:  Negative for rash and wound.   Neurological:  Negative for syncope, weakness and numbness.   Hematological:  Does not bruise/bleed easily.       Physical Exam     Initial Vitals   BP Pulse Resp Temp SpO2   24   (!) 116/53 69 16 98.2 °F (36.8 °C) 95 %      MAP       --                Physical Exam    Nursing note and vitals reviewed.  Constitutional: Vital signs are normal. She appears well-developed and well-nourished.   HENT:   Head: Normocephalic and atraumatic.   Eyes: Pupils are equal, round, and reactive to light.   Neck: Neck supple.   Cardiovascular:  Normal rate, regular rhythm, normal  heart sounds and intact distal pulses.     Exam reveals no gallop and no friction rub.       No murmur heard.  Pulses:       Dorsalis pedis pulses are 2+ on the left side.        Posterior tibial pulses are 2+ on the left side.   Pulmonary/Chest: Breath sounds normal. She has no wheezes. She has no rhonchi. She has no rales.   Musculoskeletal:      Cervical back: Neck supple.      Left upper leg: Normal.      Left knee: Bony tenderness present. No swelling, deformity, effusion, erythema, ecchymosis, lacerations or crepitus. Normal range of motion. Tenderness present over the medial joint line and lateral joint line. Normal alignment, normal meniscus and normal patellar mobility. Normal pulse.      Instability Tests: Anterior drawer test negative. Posterior drawer test negative.      Left lower leg: Normal.      Comments: Pain to the anterior patella and posterior aspect of the knee as well as along the lateral medial joint lines.  No swelling or deformity.  No increased laxity of the joint.     Neurological: She is alert and oriented to person, place, and time. She has normal strength.   Skin: Skin is warm, dry and intact. Capillary refill takes less than 2 seconds.   Psychiatric: She has a normal mood and affect. Her speech is normal and behavior is normal.         ED Course   Procedures  Labs Reviewed - No data to display       Imaging Results              X-Ray Knee 3 View Left (In process)                      Medications   acetaminophen tablet 650 mg (650 mg Oral Given 4/3/24 1955)     Medical Decision Making       APC / Resident Notes:   Patient is a 68 y.o. female who presents to the ED 04/03/2024 Who underwent emergent evaluation for left knee pain after an injury that occurred on Monday.  Patient did not hit her head or lose consciousness and denies any neck pain.  She complains of left knee pain.  She has no objective swelling.  There is no deformity to the extremity.  Gait normal.  Plus two DP and PT  pulses to the left lower extremity with no signs of distal neurovascular compromise.  She has no calf tenderness.  I do not think any acute vascular injury or compartment syndrome or DVT present.  X-ray without evidence of any acute fracture or dislocation.  Discussed with patient possible ligament or meniscal injury.  She is given knee immobilizer and crutches and referral to Orthopedics. Based on my clinical evaluation, I do not appreciate any immediate, emergent, or life threatening condition or etiology that warrants additional workup today and feel that the patient can be discharged with close follow up care. Case discussed with Dr. Phillips who is agreeable to plan of care. Follow up and return precautions discussed; patient verbalized understanding and is agreeable to plan of care. Patient discharged home in stable condition.          Attending Attestation:     Physician Attestation Statement for NP/PA:   I personally made/approved the management plan and take responsibility for the patient management.    Other NP/PA Attestation Additions:    History of Present Illness: 68-year-old female presented with knee pain status post injury.    Medical Decision Making: Initial differential diagnosis included but not limited to fracture, dislocation, and contusion.  I am in agreement with the nurse practitioner's assessment, treatment, and plan of care.                        Medical Decision Making:   Differential Diagnosis:   Arthritis  Fracture  Ligament injury  Contusion              Clinical Impression:  Final diagnoses:  [M25.562] Knee pain, left  [S80.02XA] Contusion of left knee, initial encounter (Primary)          ED Disposition Condition    Discharge Stable          ED Prescriptions       Medication Sig Dispense Start Date End Date Auth. Provider    diclofenac sodium (VOLTAREN) 1 % Gel Apply 2 g topically once daily. 200 g 4/3/2024 -- Jazz Poole NP          Follow-up Information       Follow up With  Specialties Details Why Contact Info Additional Information    Jhoan Valerio MD Sports Medicine, Orthopedic Surgery In 1 week  [address] [place] [address] DR Ferguson [address] [place] [id number]  [phone]       [facility] Emergency Medicine  As needed, If symptoms worsen [address] [place] [address] [facility] [id number] 1st floor             Jazz Poole NP  04/03/24 2112       Rudy Phillips MD  04/03/24 2129

## 2024-04-10 ENCOUNTER — PATIENT MESSAGE (OUTPATIENT)
Dept: ORTHOPEDICS | Facility: CLINIC | Age: 69
End: 2024-04-10
Payer: OTHER GOVERNMENT

## 2024-04-12 ENCOUNTER — OFFICE VISIT (OUTPATIENT)
Dept: OPHTHALMOLOGY | Facility: CLINIC | Age: 69
End: 2024-04-12
Payer: OTHER GOVERNMENT

## 2024-04-12 DIAGNOSIS — H25.13 AGE-RELATED NUCLEAR CATARACT OF BOTH EYES: ICD-10-CM

## 2024-04-12 DIAGNOSIS — H50.00 ESOTROPIA: Primary | ICD-10-CM

## 2024-04-12 PROCEDURE — 99024 POSTOP FOLLOW-UP VISIT: CPT | Mod: ,,, | Performed by: STUDENT IN AN ORGANIZED HEALTH CARE EDUCATION/TRAINING PROGRAM

## 2024-04-12 PROCEDURE — 92015 DETERMINE REFRACTIVE STATE: CPT | Mod: ,,, | Performed by: STUDENT IN AN ORGANIZED HEALTH CARE EDUCATION/TRAINING PROGRAM

## 2024-04-12 PROCEDURE — 99213 OFFICE O/P EST LOW 20 MIN: CPT | Mod: PBBFAC | Performed by: STUDENT IN AN ORGANIZED HEALTH CARE EDUCATION/TRAINING PROGRAM

## 2024-04-12 PROCEDURE — 99999 PR PBB SHADOW E&M-EST. PATIENT-LVL III: CPT | Mod: PBBFAC,,, | Performed by: STUDENT IN AN ORGANIZED HEALTH CARE EDUCATION/TRAINING PROGRAM

## 2024-04-12 NOTE — ASSESSMENT & PLAN NOTE
History of myopia s/p LASIK in 2000  2 years of diplopia and esotropia  Hx of MRI Brain 10/2021 that was normal  Given 3BO prisms OU in 8/2023 but over past month noticing increasing diplopia    2/19/24: Has moderate angle esotropia that builds after prolonged cover test  No significant D:N disparity.   Did PAT in clinic and got 30 ET for distance    S/p strabismus surgery - BMRc 5mm OU - 3/28/24     POW2: ortho, alignment excellent  Can taper maxitrol to once dialy for one week then stop  Can resume normal activities  RTC 1 month

## 2024-04-12 NOTE — ASSESSMENT & PLAN NOTE
Patient reports difficulty driving at night ; also reports poor vision OU    Has 2-3+ NSC OU   Myopic shift OS ; mild glare today    Discussed could potentially benefit from cataract surger    Plan:  Will try glasses for now (warned of possible anisokoenia)   F/u with local eye care provider for cataract surgery evaluation

## 2024-04-12 NOTE — PROGRESS NOTES
HPI    68 yr old presents to clinic for 2 week s/p Bilateral medial rectus   recession of 5 mm (CPT 00799-78).    Mrs cordova states that she is happy with her surgery results, no diplopia.    She would like an opinion on if she is ready for cataract surgery Va os   has been poor.  Still using maxitrol mary once a day at bed time      Last edited by Devan Quevedo MD on 4/12/2024 11:37 AM.        ROS    Positive for: Eyes  Negative for: Constitutional  Last edited by Devan Quevedo MD on 4/12/2024 11:29 AM.        Assessment /Plan     For exam results, see Encounter Report.    Esotropia    Age-related nuclear cataract of both eyes        Problem List Items Addressed This Visit          Ophtho    Esotropia - Primary    Current Assessment & Plan     History of myopia s/p LASIK in 2000  2 years of diplopia and esotropia  Hx of MRI Brain 10/2021 that was normal  Given 3BO prisms OU in 8/2023 but over past month noticing increasing diplopia    2/19/24: Has moderate angle esotropia that builds after prolonged cover test  No significant D:N disparity.   Did PAT in clinic and got 30 ET for distance    S/p strabismus surgery - BMRc 5mm OU - 3/28/24     POW2: ortho, alignment excellent  Can taper maxitrol to once dialy for one week then stop  Can resume normal activities  RTC 1 month         Age-related nuclear cataract of both eyes    Current Assessment & Plan     Patient reports difficulty driving at night ; also reports poor vision OU    Has 2-3+ NSC OU   Myopic shift OS ; mild glare today    Discussed could potentially benefit from cataract surger    Plan:  Will try glasses for now (warned of possible anisokoenia)   F/u with local eye care provider for cataract surgery evaluation             Devan Quevedo MD  Pediatric Ophthalmology and Adult Strabismus  [facility]

## 2024-04-17 ENCOUNTER — PATIENT MESSAGE (OUTPATIENT)
Dept: ORTHOPEDICS | Facility: CLINIC | Age: 69
End: 2024-04-17
Payer: OTHER GOVERNMENT

## 2024-04-18 ENCOUNTER — HOSPITAL ENCOUNTER (OUTPATIENT)
Dept: RADIOLOGY | Facility: HOSPITAL | Age: 69
Discharge: HOME | End: 2024-04-18
Attending: ORTHOPAEDIC SURGERY
Payer: OTHER GOVERNMENT

## 2024-04-18 ENCOUNTER — OFFICE VISIT (OUTPATIENT)
Dept: ORTHOPEDICS | Facility: CLINIC | Age: 69
End: 2024-04-18
Payer: OTHER GOVERNMENT

## 2024-04-18 VITALS — WEIGHT: 108 LBS | HEIGHT: 60 IN | BODY MASS INDEX: 21.2 KG/M2

## 2024-04-18 DIAGNOSIS — M19.031 ARTHRITIS OF SCAPHOID-TRAPEZIUM-TRAPEZOID JOINT OF RIGHT HAND: ICD-10-CM

## 2024-04-18 DIAGNOSIS — M18.11 ARTHRITIS OF CARPOMETACARPAL (CMC) JOINT OF RIGHT THUMB: Primary | ICD-10-CM

## 2024-04-18 DIAGNOSIS — M79.641 RIGHT HAND PAIN: Primary | ICD-10-CM

## 2024-04-18 DIAGNOSIS — M79.641 RIGHT HAND PAIN: ICD-10-CM

## 2024-04-18 PROCEDURE — 99214 OFFICE O/P EST MOD 30 MIN: CPT | Mod: S$PBB,,, | Performed by: ORTHOPAEDIC SURGERY

## 2024-04-18 PROCEDURE — 73130 X-RAY EXAM OF HAND: CPT | Mod: 26,RT,, | Performed by: RADIOLOGY

## 2024-04-18 PROCEDURE — 99999 PR PBB SHADOW E&M-EST. PATIENT-LVL III: CPT | Mod: PBBFAC,,, | Performed by: ORTHOPAEDIC SURGERY

## 2024-04-18 PROCEDURE — 99213 OFFICE O/P EST LOW 20 MIN: CPT | Mod: PBBFAC,25 | Performed by: ORTHOPAEDIC SURGERY

## 2024-04-18 PROCEDURE — 73130 X-RAY EXAM OF HAND: CPT | Mod: TC,RT

## 2024-04-18 NOTE — H&P
[facility]  History & Physical  Orthopedics     SUBJECTIVE:       COVID-19 attestation:  This patient was treated during the COVID-19 pandemic.  This was discussed with the patient, they are aware of our current policies and procedures, were given the option of delaying their visit and or switching to a virtual visit, delaying their surgery when applicable, and they elect to proceed.     Chief Complaint:  Right thumb pain, right hand numbness and tingling     Referring Provider: No ref. provider found      History of Present Illness:  Patient is a 68 y.o. right hand dominant female who presents today with complaints of numbness and tingling in the right hand as well as some right thumb pain.  The patient has had multiple procedures on the right hand including 2 carpal tunnel releases done in 2017 and 2018.  These were followed by a reported right thumb CMC arthroplasty in approximately 2020.  This was all done in [place].  She does not have her records today.  She notes that unfortunately she is still suffering with numbness and tingling in the right hand which is constant to the thumb and index finger.  She also reports significant hypersensitivity in the distribution of the radial sensory nerve and at the prior thumb surgical site.  She is also having significant right thumb CMC basal joint pain which unfortunately did not improve to any significant extent after her initial thumb CMC surgery.  She presents for initial evaluation with no other complaints.     Interval history November 30, 2023: The patient returns today for re-evaluation.  She notes that her biggest issue at this point in time is persistent pain in the right thumb CMC and STT regions both volarly dorsally and radially.  She brings in some outside records today which demonstrate that she in 2019 had a proximal 1st metacarpal and distal trapezial partial excision with palmaris longus interposition and pinning.  She notes  that this really did not help her very much and certainly has not held up.  She does endorse once again constant numbness and tingling to her right thumb and index finger and is status post extensive cervical spine fusion from see 1 to T1 per her report as well as 2 carpal tunnel releases.  She presents for re-evaluation with no other complaints.  Her prior injection helped temporarily but has worn off and she would like to discuss additional options.     Interval history April 16, 2024: The patient returns today for re-evaluation.  She was previously scheduled for right thumb and hand surgery but unfortunately had to cancel this.  She now presents today noting that her pain persists and remains severe.  Pain is rated 7/10 in severity.  She would like to reschedule her planned surgery and has no other new complaints.     The patient is a/an retired.     Onset of symptoms/DOI was many years ago.     Symptoms are aggravated by activity and movement.     Symptoms are alleviated by rest.     Symptoms consist of pain and numbness/tingling.     The patient rates their pain as 7/10     Attempted treatment(s) and/or interventions include activity modifications, rest, steroid injection and surgical intervention.     The patient denies any fevers, chills, N/V, D/C and presents for evaluation.                Past Medical History:   Diagnosis Date    Constipation      Dependence on nocturnal oxygen therapy       2 liters/ nasal cannula    Depression      Emphysema lung      Encounter for blood transfusion      GERD (gastroesophageal reflux disease)      Migraines      Neck pain                  Past Surgical History:   Procedure Laterality Date    AUGMENTATION OF BREAST        CERVICAL FUSION        COLONOSCOPY        COLONOSCOPY N/A 6/2/2022     Procedure: COLONOSCOPY;  Surgeon: Joni Burnett MD;  Location: [facility];  Service: Endoscopy;  Laterality: N/A;    COLONOSCOPY N/A 9/30/2022     Procedure: COLONOSCOPY;  Surgeon:  Joni Burnett MD;  Location: [facility] ENDO;  Service: Endoscopy;  Laterality: N/A;    ENDOSCOPIC RELEASE OF BOTH CARPAL TUNNELS         2 carpal surgeries on right, one on left    ESOPHAGEAL DILATION        ESOPHAGOGASTRODUODENOSCOPY N/A 02/08/2022     Procedure: EGD (ESOPHAGOGASTRODUODENOSCOPY);  Surgeon: Joni Burnett MD;  Location: [facility] ENDO;  Service: Endoscopy;  Laterality: N/A;    INTERPOSITION ARTHROPLASTY OF CARPOMETACARPAL JOINTS Right       thumb    TONSILLECTOMY        TOTAL HIP ARTHROPLASTY Bilateral      UPPER GASTROINTESTINAL ENDOSCOPY                  Review of patient's allergies indicates:   Allergen Reactions    Penicillins Hives      Social History            Social History Narrative    Not on file                Family History   Problem Relation Age of Onset    Breast cancer Maternal Aunt      Colon cancer Neg Hx      Colon polyps Neg Hx      Crohn's disease Neg Hx      Ulcerative colitis Neg Hx      Stomach cancer Neg Hx      Rectal cancer Neg Hx              Current Outpatient Medications:     acetaminophen (TYLENOL) 325 MG tablet, Take 325 mg by mouth., Disp: , Rfl:     aspirin (ECOTRIN) 81 MG EC tablet, Take 81 mg by mouth once daily., Disp: , Rfl:     bisacodyL (DULCOLAX) 5 mg EC tablet, Dulcolax (bisacodyl) 5 mg tablet,delayed release  take 2 (5mg) tablets as needed if no bowel movement for 2-3 days, Disp: , Rfl:     buPROPion (WELLBUTRIN XL) 150 MG TB24 tablet, TAKE THREE TABLETS BY MOUTH EVERY DAY FOR DEPRESSION, Disp: , Rfl:     calcium carbonate-vitamin D3 250-125 mg 250 mg-3.125 mcg (125 unit) Tab, TAKE 2 TABLETS BY MOUTH EVERY DAY AS A MINERAL SUPPLEMENT, Disp: , Rfl:     cyanocobalamin (VITAMIN B-12) 1000 MCG tablet, TAKE ONE TABLET BY MOUTH EVERY DAY FOR VITAMIN DEFICIENCIES, Disp: , Rfl:     ferrous sulfate (FEOSOL) 325 mg (65 mg iron) Tab tablet, Iron (ferrous sulfate) 325 mg (65 mg iron) tablet  Take 1 tablet every other day by oral route., Disp: , Rfl:     hydrOXYzine  (ATARAX) 50 MG tablet, TAKE ONE TABLET BY MOUTH THREE TIMES A DAY AS NEEDED FOR ALLERGIES/ITCHING ANXIETY, Disp: , Rfl:     hydrOXYzine (ATARAX) 50 MG tablet, Take 50 mg by mouth., Disp: , Rfl:     LIDOcaine (XYLOCAINE) 5 % Oint ointment, Apply topically., Disp: , Rfl:     methocarbamoL (ROBAXIN) 500 MG Tab, TAKE ONE TABLET BY MOUTH THREE TIMES A DAY IF NEEDED (MUSCLE RELAXANT) (START WITH 1/2 TABLET AT BEDTIME THEN ADD IN MORNING DOSE AND IF  TOLERATED THEN TAKE 1/2 TABLET THREE TIMES A DAY FOR A FEW DAYS AND THEN  CAN INCREASE TO A FULL TABLET IF NO SIDE EFFECTS) (MUSCLE RELAXANT)  (START WITH 1/2 TABLET AT BEDTIME THEN ADD IN MORNING DOSE AND IF   TOLERATED THEN TAKE 1/2 TABLET THREE TIMES A DAY FOR A FEW DAYS AND THEN   CAN INCREASE TO A FULL TABLET IF NO SIDE EFFECTS), Disp: , Rfl:     omeprazole (PRILOSEC OTC) 20 MG tablet, Take 20 mg by mouth., Disp: , Rfl:     omeprazole (PRILOSEC) 40 MG capsule, omeprazole 40 mg capsule,delayed release  Take 1 capsule every day by oral route., Disp: , Rfl:     polyethylene glycol (GLYCOLAX) 17 gram/dose powder, Miralax 17 gram/dose oral powder  1 capful daily at bedtime, Disp: , Rfl:     sumatriptan (IMITREX) 100 MG tablet, Take 100 mg by mouth every 2 (two) hours as needed for Migraine., Disp: , Rfl:     trazodone HCl (TRAZODONE ORAL), trazodone  100 mg  at night, Disp: , Rfl:         Review of Systems:  As per HPI otherwise noncontributory     OBJECTIVE:       Vital Signs (Most Recent):  Vitals         Vitals:     11/30/23 1150   Weight: 47.6 kg (105 lb)   Height: 5' (1.524 m)         Body mass index is 20.51 kg/m².        Physical Exam:  Constitutional: The patient appears well-developed and well-nourished. No distress.   Skin: No lesions appreciated  Head: Normocephalic and atraumatic.   Nose: Nose normal.   Ears: No deformities seen  Eyes: Conjunctivae and EOM are normal.   Neck: No tracheal deviation present.   Cardiovascular: Normal rate and intact distal pulses.     Pulmonary/Chest: Effort normal. No respiratory distress.   Abdominal: There is no guarding.   Neurological: The patient is alert.   Psychiatric: The patient has a normal mood and affect.      Right Hand/Wrist Examination:     Observation/Inspection:  Swelling                       none                  Deformity                     none  Discoloration               none                  Scars                           extensile revision carpal tunnel, right thumb CMC all well healed                  Atrophy                        none     HAND/WRIST EXAMINATION:  Finkelstein's Test                                Neg  WHAT Test                                         Neg  Snuff box tenderness                          Neg  Ching's Test                                     Neg  Hook of Hamate Tenderness              Neg  CMC grind                                           positive  Circumduction test                              Positive  FCR intact  Tender to palpation at the distal scaphoid/STT joint     Neurovascular Exam:  Digits WWP, brisk CR < 3s throughout  NVI motor/LTS to M/R/U nerves, radial pulse 2+ except for hypersensitivity and decreased light touch sensation to the radial sensory distribution of the right dorsal thumb and hand as well as decreased light touch sensation to the right thumb and index finger median nerve distributions at baseline  Tinel's Test - Carpal Tunnel                Neg  Tinel's Test - Cubital Tunnel               Neg  Phalen's Test                                      Neg  Median Nerve Compression Test       Neg     ROM hand full, painless except the right thumb CMC/STT joints     ROM wrist full, painless    ROM elbow full, painless     Abdomen not guarded  Respirations nonlabored  Perfusion intact     Diagnostic Results:     Imaging - I independently viewed the patient's imaging as well as the radiology report.  Xrays of the patient's right hand  demonstrate no evidence of any  acute fractures or dislocations with postop changes of the right thumb CMC joint and severe STT arthritis and residual CMC arthritis versus subsidence.     EMG - none     ASSESSMENT/PLAN:       68 y.o. yo female with recurrent/persistent right thumb basal joint pain, STT arthritis, numbness tingling right hand  Plan: The patient and I had a thorough discussion today.  We discussed the working diagnosis as well as several other potential alternative diagnoses.  Treatment options were discussed, both conservative and surgical.  Conservative treatment options would include things such as activity modifications, workplace modifications, a period of rest, oral vs topical OTC and prescription anti-inflammatory medications, occupational therapy, splinting/bracing, immobilization, corticosteroid injections, and others.  Surgical options were discussed as well.      At this time, Saira would like to proceed with rescheduling her previously planned and scheduled surgical intervention.  This will consist of a revision right thumb CMC arthroplasty with possible LR TI versus suture suspension, possible tendon harvest, distal scaphoid excision with possible tendon interposition and any other indicated procedures at the time of surgery on May 10th, 2024 which I feel is reasonable.  She understands that this will not be expected to improve her numbness and tingling in the right hand and I certainly can not guarantee any results given the revision nature of this and other factors.  She would like to proceed and we will get this rescheduled.       The patient has not responded to adequate non operative treatment at this time and/or non operative treatment is not indicated. Thus, the risks, benefits and alternatives to surgery were discussed with the patient in detail.  Specific risks include but are not limited to bleeding, infection, vessel and/or nerve damage, pain, numbness, tingling, compartment syndrome, need for additional  surgery, failure to return to pre-injury and/or preoperative functional status, inability to return to work, scar sensitivity, delayed healing, complex regional pain syndrome, weakness, pulley injury, tendon injury, bowstringing, partial and/or incomplete relief of symptoms, persistence of and/or worsening of symptoms, hardware and/or surgical failure, prominent and/or symptomatic hardware possibly necessitating future removal, osteomyelitis, amputation, loss of function, stiffness, rotational malalignment, functional debility, dysfunction, decreased  strength, need for prolonged postoperative rehabilitation, malunion, nonunion, deep venous thrombosis, pulmonary embolism, arthritis and death.  The patient states an understanding and wishes to proceed with surgery.   All questions were answered.  No guarantees were implied or stated.  Written informed consent was obtained.       Hunter Smith M.D.     Please be aware that this note has been generated with the assistance of [employer] voice-to-text.  Please excuse any spelling or grammatical errors.     Thank you for choosing Dr. Hunter Smith for your orthopedic hand and upper extremity care. It is our goal to provide you with exceptional care that will help keep you healthy, active, and get you back in the game.     If you felt that you received exemplary care today, please consider leaving feedback for Dr. Smith on [employer] at [email or web address].     Please do not hesitate to reach out to us via email, phone, or MyChart with any questions, concerns, or feedback.

## 2024-04-19 ENCOUNTER — TELEPHONE (OUTPATIENT)
Dept: ORTHOPEDICS | Facility: CLINIC | Age: 69
End: 2024-04-19
Payer: OTHER GOVERNMENT

## 2024-04-19 NOTE — TELEPHONE ENCOUNTER
Message sent to [facility] OR desk to move case from depot to 5/10.    Evon Gold MS, OTC  Clinical & OR Assistant to Dr. Hunter Smith  [facility]  [phone]    ----- Message from Hunter Smith MD sent at 4/18/2024  4:34 PM CDT -----  Can you please have [facility] move this patient's case out of the depot and onto the schedule on May 10, 2024?

## 2024-04-22 ENCOUNTER — PATIENT MESSAGE (OUTPATIENT)
Dept: ORTHOPEDICS | Facility: CLINIC | Age: 69
End: 2024-04-22
Payer: OTHER GOVERNMENT

## 2024-04-29 ENCOUNTER — ANESTHESIA EVENT (OUTPATIENT)
Dept: SURGERY | Facility: HOSPITAL | Age: 69
End: 2024-04-29
Payer: OTHER GOVERNMENT

## 2024-05-03 ENCOUNTER — PATIENT MESSAGE (OUTPATIENT)
Dept: ORTHOPEDICS | Facility: CLINIC | Age: 69
End: 2024-05-03
Payer: OTHER GOVERNMENT

## 2024-05-09 ENCOUNTER — OFFICE VISIT (OUTPATIENT)
Dept: OPHTHALMOLOGY | Facility: CLINIC | Age: 69
End: 2024-05-09
Payer: OTHER GOVERNMENT

## 2024-05-09 DIAGNOSIS — H25.13 AGE-RELATED NUCLEAR CATARACT OF BOTH EYES: ICD-10-CM

## 2024-05-09 DIAGNOSIS — H50.00 ESOTROPIA: Primary | ICD-10-CM

## 2024-05-09 PROCEDURE — 99999 PR PBB SHADOW E&M-EST. PATIENT-LVL III: CPT | Mod: PBBFAC,,, | Performed by: STUDENT IN AN ORGANIZED HEALTH CARE EDUCATION/TRAINING PROGRAM

## 2024-05-09 PROCEDURE — 99024 POSTOP FOLLOW-UP VISIT: CPT | Mod: ,,, | Performed by: STUDENT IN AN ORGANIZED HEALTH CARE EDUCATION/TRAINING PROGRAM

## 2024-05-09 PROCEDURE — 99213 OFFICE O/P EST LOW 20 MIN: CPT | Mod: PBBFAC | Performed by: STUDENT IN AN ORGANIZED HEALTH CARE EDUCATION/TRAINING PROGRAM

## 2024-05-09 NOTE — ASSESSMENT & PLAN NOTE
Patient reports difficulty driving at night ; also reports poor vision OU    Has 2-3+ NSC OU   Myopic shift OS ; mild glare today    Discussed could potentially benefit from cataract surgery    Plan:  Will try glasses for now (warned of possible anisokoenia)   F/u with local eye care provider for cataract surgery evaluation

## 2024-05-09 NOTE — ASSESSMENT & PLAN NOTE
History of myopia s/p LASIK in 2000  2 years of diplopia and esotropia  Hx of MRI Brain 10/2021 that was normal  Given 3BO prisms OU in 8/2023 but over past month noticing increasing diplopia    2/19/24: Has moderate angle esotropia that builds after prolonged cover test  No significant D:N disparity.   Did PAT in clinic and got 30 ET for distance    S/p strabismus surgery - BMRc 5mm OU - 3/28/24     POM1: ortho, alignment excellent    Plan:  Can f/u strabismus PRN

## 2024-05-09 NOTE — PROGRESS NOTES
"HPI    S/p Bilateral medial rectus recession- 3/28/2024    68 yr old presents to clinic for 1 month s/p Bilateral medial rectus   recession of 5 mm. Pt states doing well. She notice that RT eye is getting   "weaker" and maybe turning to turn inwards, no noted diplopia. [facility]   [facility]. She denies pain and   discomfort.     Eyemeds  No gtts  Last edited by Cody Brennan on 5/9/2024  2:12 PM.        ROS    Negative for: Constitutional, Gastrointestinal, Neurological, Skin,   Genitourinary, Musculoskeletal, HENT, Endocrine, Cardiovascular, Eyes,   Respiratory, Psychiatric, Allergic/Imm, Heme/Lymph  Last edited by Devan Quevedo MD on 5/9/2024  5:03 PM.        Assessment /Plan     For exam results, see Encounter Report.    Esotropia    Age-related nuclear cataract of both eyes          Problem List Items Addressed This Visit          Ophtho    Esotropia - Primary    Current Assessment & Plan     History of myopia s/p LASIK in 2000  2 years of diplopia and esotropia  Hx of MRI Brain 10/2021 that was normal  Given 3BO prisms OU in 8/2023 but over past month noticing increasing diplopia    2/19/24: Has moderate angle esotropia that builds after prolonged cover test  No significant D:N disparity.   Did PAT in clinic and got 30 ET for distance    S/p strabismus surgery - BMRc 5mm OU - 3/28/24     POM1: ortho, alignment excellent    Plan:  Can f/u strabismus PRN         Age-related nuclear cataract of both eyes    Current Assessment & Plan     Patient reports difficulty driving at night ; also reports poor vision OU    Has 2-3+ NSC OU   Myopic shift OS ; mild glare today    Discussed could potentially benefit from cataract surgery    Plan:  Will try glasses for now (warned of possible anisokoenia)   F/u with local eye care provider for cataract surgery evaluation           Devan Quevedo MD  Pediatric Ophthalmology and Adult Strabismus  [facility]        "

## 2024-05-10 ENCOUNTER — ANESTHESIA (OUTPATIENT)
Dept: SURGERY | Facility: HOSPITAL | Age: 69
End: 2024-05-10
Payer: OTHER GOVERNMENT

## 2024-05-23 ENCOUNTER — PATIENT MESSAGE (OUTPATIENT)
Dept: ORTHOPEDICS | Facility: CLINIC | Age: 69
End: 2024-05-23
Payer: OTHER GOVERNMENT

## 2024-05-23 RX ORDER — ACETAMINOPHEN 500 MG
1000 TABLET ORAL 2 TIMES DAILY
Qty: 20 TABLET | Refills: 0 | Status: SHIPPED | OUTPATIENT
Start: 2024-05-23

## 2024-05-23 RX ORDER — OXYCODONE AND ACETAMINOPHEN 5; 325 MG/1; MG/1
1 TABLET ORAL EVERY 6 HOURS PRN
Qty: 10 TABLET | Refills: 0 | Status: SHIPPED | OUTPATIENT
Start: 2024-05-23 | End: 2024-05-30

## 2024-05-23 RX ORDER — IBUPROFEN 600 MG/1
600 TABLET ORAL 3 TIMES DAILY
Qty: 20 TABLET | Refills: 0 | Status: SHIPPED | OUTPATIENT
Start: 2024-05-23

## 2024-05-24 ENCOUNTER — HOSPITAL ENCOUNTER (OUTPATIENT)
Facility: HOSPITAL | Age: 69
Discharge: HOME | End: 2024-05-24
Attending: ORTHOPAEDIC SURGERY | Admitting: ORTHOPAEDIC SURGERY
Payer: OTHER GOVERNMENT

## 2024-05-24 VITALS
HEIGHT: 60 IN | OXYGEN SATURATION: 97 % | SYSTOLIC BLOOD PRESSURE: 140 MMHG | BODY MASS INDEX: 20.62 KG/M2 | WEIGHT: 105 LBS | HEART RATE: 58 BPM | TEMPERATURE: 98 F | RESPIRATION RATE: 32 BRPM | DIASTOLIC BLOOD PRESSURE: 70 MMHG

## 2024-05-24 DIAGNOSIS — M18.11 ARTHRITIS OF CARPOMETACARPAL (CMC) JOINT OF RIGHT THUMB: ICD-10-CM

## 2024-05-24 DIAGNOSIS — M19.031 ARTHRITIS OF SCAPHOID-TRAPEZIUM-TRAPEZOID JOINT OF RIGHT HAND: ICD-10-CM

## 2024-05-24 PROCEDURE — 25000003 PHARM REV CODE 250: Performed by: NURSE PRACTITIONER

## 2024-05-24 PROCEDURE — 88304 TISSUE EXAM BY PATHOLOGIST: CPT | Performed by: PATHOLOGY

## 2024-05-24 PROCEDURE — 63600175 PHARM REV CODE 636 W HCPCS

## 2024-05-24 PROCEDURE — 27201423 OPTIME MED/SURG SUP & DEVICES STERILE SUPPLY: Performed by: ORTHOPAEDIC SURGERY

## 2024-05-24 PROCEDURE — 25210 REMOVAL OF WRIST BONE: CPT | Mod: 59,51,RT, | Performed by: ORTHOPAEDIC SURGERY

## 2024-05-24 PROCEDURE — 63600175 PHARM REV CODE 636 W HCPCS: Performed by: NURSE ANESTHETIST, CERTIFIED REGISTERED

## 2024-05-24 PROCEDURE — 25447 ARTHRP NTRCRP/CRP/MTCR NTRPS: CPT | Mod: 22,RT,, | Performed by: ORTHOPAEDIC SURGERY

## 2024-05-24 PROCEDURE — D9220A PRA ANESTHESIA: Mod: CRNA,,, | Performed by: NURSE ANESTHETIST, CERTIFIED REGISTERED

## 2024-05-24 PROCEDURE — C1713 ANCHOR/SCREW BN/BN,TIS/BN: HCPCS | Performed by: ORTHOPAEDIC SURGERY

## 2024-05-24 PROCEDURE — 88304 TISSUE EXAM BY PATHOLOGIST: CPT | Mod: 26,,, | Performed by: PATHOLOGY

## 2024-05-24 PROCEDURE — 25310 TRANSPLANT FOREARM TENDON: CPT | Mod: 51,RT,, | Performed by: ORTHOPAEDIC SURGERY

## 2024-05-24 PROCEDURE — 36000709 HC OR TIME LEV III EA ADD 15 MIN: Performed by: ORTHOPAEDIC SURGERY

## 2024-05-24 PROCEDURE — 64415 NJX AA&/STRD BRCH PLXS IMG: CPT | Mod: 59,RT | Performed by: STUDENT IN AN ORGANIZED HEALTH CARE EDUCATION/TRAINING PROGRAM

## 2024-05-24 PROCEDURE — 63600175 PHARM REV CODE 636 W HCPCS: Performed by: STUDENT IN AN ORGANIZED HEALTH CARE EDUCATION/TRAINING PROGRAM

## 2024-05-24 PROCEDURE — D9220A PRA ANESTHESIA: Mod: ANES,,, | Performed by: STUDENT IN AN ORGANIZED HEALTH CARE EDUCATION/TRAINING PROGRAM

## 2024-05-24 PROCEDURE — 99900035 HC TECH TIME PER 15 MIN (STAT)

## 2024-05-24 PROCEDURE — 63600175 PHARM REV CODE 636 W HCPCS: Mod: JZ,JG | Performed by: ORTHOPAEDIC SURGERY

## 2024-05-24 PROCEDURE — 71000033 HC RECOVERY, INTIAL HOUR: Performed by: ORTHOPAEDIC SURGERY

## 2024-05-24 PROCEDURE — 94761 N-INVAS EAR/PLS OXIMETRY MLT: CPT

## 2024-05-24 PROCEDURE — 25000003 PHARM REV CODE 250: Performed by: ORTHOPAEDIC SURGERY

## 2024-05-24 PROCEDURE — 25000003 PHARM REV CODE 250: Performed by: NURSE ANESTHETIST, CERTIFIED REGISTERED

## 2024-05-24 PROCEDURE — 63600175 PHARM REV CODE 636 W HCPCS: Performed by: NURSE PRACTITIONER

## 2024-05-24 PROCEDURE — 37000009 HC ANESTHESIA EA ADD 15 MINS: Performed by: ORTHOPAEDIC SURGERY

## 2024-05-24 PROCEDURE — 71000015 HC POSTOP RECOV 1ST HR: Performed by: ORTHOPAEDIC SURGERY

## 2024-05-24 PROCEDURE — 37000008 HC ANESTHESIA 1ST 15 MINUTES: Performed by: ORTHOPAEDIC SURGERY

## 2024-05-24 PROCEDURE — 36000708 HC OR TIME LEV III 1ST 15 MIN: Performed by: ORTHOPAEDIC SURGERY

## 2024-05-24 PROCEDURE — 88311 DECALCIFY TISSUE: CPT | Performed by: PATHOLOGY

## 2024-05-24 PROCEDURE — 88307 TISSUE EXAM BY PATHOLOGIST: CPT | Performed by: PATHOLOGY

## 2024-05-24 DEVICE — SUTURE ANCHOR, MICRO CORKSCREW FT
Type: IMPLANTABLE DEVICE | Site: HAND | Status: FUNCTIONAL
Brand: ARTHREX®

## 2024-05-24 DEVICE — TENO SCRW,BIO-COMP
Type: IMPLANTABLE DEVICE | Site: HAND | Status: FUNCTIONAL
Brand: ARTHREX®

## 2024-05-24 RX ORDER — MUPIROCIN 20 MG/G
OINTMENT TOPICAL
Status: DISCONTINUED | OUTPATIENT
Start: 2024-05-24 | End: 2024-05-24 | Stop reason: HOSPADM

## 2024-05-24 RX ORDER — FENTANYL CITRATE 50 UG/ML
INJECTION, SOLUTION INTRAMUSCULAR; INTRAVENOUS
Status: COMPLETED
Start: 2024-05-24 | End: 2024-05-24

## 2024-05-24 RX ORDER — PROPOFOL 10 MG/ML
VIAL (ML) INTRAVENOUS
Status: DISCONTINUED | OUTPATIENT
Start: 2024-05-24 | End: 2024-05-24

## 2024-05-24 RX ORDER — SODIUM CHLORIDE 0.9 % (FLUSH) 0.9 %
10 SYRINGE (ML) INJECTION
Status: DISCONTINUED | OUTPATIENT
Start: 2024-05-24 | End: 2024-05-24 | Stop reason: HOSPADM

## 2024-05-24 RX ORDER — HALOPERIDOL 5 MG/ML
0.5 INJECTION INTRAMUSCULAR EVERY 10 MIN PRN
Status: DISCONTINUED | OUTPATIENT
Start: 2024-05-24 | End: 2024-05-24 | Stop reason: HOSPADM

## 2024-05-24 RX ORDER — FENTANYL CITRATE 50 UG/ML
25 INJECTION, SOLUTION INTRAMUSCULAR; INTRAVENOUS EVERY 5 MIN PRN
Status: DISCONTINUED | OUTPATIENT
Start: 2024-05-25 | End: 2024-05-24 | Stop reason: HOSPADM

## 2024-05-24 RX ORDER — MIDAZOLAM HYDROCHLORIDE 1 MG/ML
1 INJECTION, SOLUTION INTRAMUSCULAR; INTRAVENOUS
Status: DISCONTINUED | OUTPATIENT
Start: 2024-05-24 | End: 2024-05-24 | Stop reason: HOSPADM

## 2024-05-24 RX ORDER — OXYCODONE HYDROCHLORIDE 5 MG/1
5 TABLET ORAL
Status: DISCONTINUED | OUTPATIENT
Start: 2024-05-24 | End: 2024-05-24 | Stop reason: HOSPADM

## 2024-05-24 RX ORDER — FLUOXETINE 10 MG/1
10 CAPSULE ORAL DAILY
Status: ON HOLD | COMMUNITY
End: 2024-05-24 | Stop reason: HOSPADM

## 2024-05-24 RX ORDER — BUPROPION HYDROCHLORIDE 75 MG/1
75 TABLET ORAL 2 TIMES DAILY
Status: ON HOLD | COMMUNITY
End: 2024-05-24 | Stop reason: HOSPADM

## 2024-05-24 RX ORDER — ZOLPIDEM TARTRATE 6.25 MG/1
6.25 TABLET, FILM COATED, EXTENDED RELEASE ORAL NIGHTLY PRN
Status: ON HOLD | COMMUNITY
End: 2024-05-24 | Stop reason: HOSPADM

## 2024-05-24 RX ORDER — LIDOCAINE HYDROCHLORIDE 20 MG/ML
INJECTION INTRAVENOUS
Status: DISCONTINUED | OUTPATIENT
Start: 2024-05-24 | End: 2024-05-24

## 2024-05-24 RX ORDER — DEXMEDETOMIDINE HYDROCHLORIDE 100 UG/ML
INJECTION, SOLUTION INTRAVENOUS
Status: DISCONTINUED | OUTPATIENT
Start: 2024-05-24 | End: 2024-05-24

## 2024-05-24 RX ORDER — ROPIVACAINE HYDROCHLORIDE 5 MG/ML
INJECTION, SOLUTION EPIDURAL; INFILTRATION; PERINEURAL
Status: COMPLETED | OUTPATIENT
Start: 2024-05-24 | End: 2024-05-24

## 2024-05-24 RX ORDER — ACETAMINOPHEN 500 MG
1000 TABLET ORAL
Status: COMPLETED | OUTPATIENT
Start: 2024-05-24 | End: 2024-05-24

## 2024-05-24 RX ORDER — CLINDAMYCIN PHOSPHATE 900 MG/50ML
900 INJECTION, SOLUTION INTRAVENOUS
Status: COMPLETED | OUTPATIENT
Start: 2024-05-24 | End: 2024-05-24

## 2024-05-24 RX ORDER — FENTANYL CITRATE 50 UG/ML
25 INJECTION, SOLUTION INTRAMUSCULAR; INTRAVENOUS EVERY 5 MIN PRN
Status: DISCONTINUED | OUTPATIENT
Start: 2024-05-24 | End: 2024-05-24 | Stop reason: HOSPADM

## 2024-05-24 RX ORDER — CELECOXIB 200 MG/1
400 CAPSULE ORAL
Status: COMPLETED | OUTPATIENT
Start: 2024-05-24 | End: 2024-05-24

## 2024-05-24 RX ORDER — PROPOFOL 10 MG/ML
VIAL (ML) INTRAVENOUS CONTINUOUS PRN
Status: DISCONTINUED | OUTPATIENT
Start: 2024-05-24 | End: 2024-05-24

## 2024-05-24 RX ADMIN — FENTANYL CITRATE 50 MCG: 50 INJECTION INTRAMUSCULAR; INTRAVENOUS at 08:05

## 2024-05-24 RX ADMIN — PROPOFOL 10 MG: 10 INJECTION, EMULSION INTRAVENOUS at 08:05

## 2024-05-24 RX ADMIN — MIDAZOLAM 1 MG: 1 INJECTION INTRAMUSCULAR; INTRAVENOUS at 08:05

## 2024-05-24 RX ADMIN — CLINDAMYCIN IN 5 PERCENT DEXTROSE 900 MG: 18 INJECTION, SOLUTION INTRAVENOUS at 08:05

## 2024-05-24 RX ADMIN — ROPIVACAINE HYDROCHLORIDE 20 ML: 5 INJECTION EPIDURAL; INFILTRATION; PERINEURAL at 07:05

## 2024-05-24 RX ADMIN — LIDOCAINE HYDROCHLORIDE 50 MG: 20 INJECTION INTRAVENOUS at 08:05

## 2024-05-24 RX ADMIN — SODIUM CHLORIDE: 9 INJECTION, SOLUTION INTRAVENOUS at 07:05

## 2024-05-24 RX ADMIN — PROPOFOL 40 MCG/KG/MIN: 10 INJECTION, EMULSION INTRAVENOUS at 08:05

## 2024-05-24 RX ADMIN — PROPOFOL 10 MG: 10 INJECTION, EMULSION INTRAVENOUS at 09:05

## 2024-05-24 RX ADMIN — ACETAMINOPHEN 1000 MG: 500 TABLET ORAL at 07:05

## 2024-05-24 RX ADMIN — CELECOXIB 400 MG: 200 CAPSULE ORAL at 07:05

## 2024-05-24 RX ADMIN — MUPIROCIN: 20 OINTMENT TOPICAL at 07:05

## 2024-05-24 RX ADMIN — DEXMEDETOMIDINE 8 MCG: 100 INJECTION, SOLUTION, CONCENTRATE INTRAVENOUS at 08:05

## 2024-05-24 NOTE — PLAN OF CARE
Pre op complete. Waiting on anesthesia consent and update. Nobody here with patient currently; daughter is in the area, on standby for after surgery. Pt resting comfortably with all questions addressed at this time and call light in reach.

## 2024-05-24 NOTE — TRANSFER OF CARE
Anesthesia Transfer of Care Note    Patient: Saira Enrrique    Procedure(s) Performed: Procedure(s) (LRB):  OSTECTOMY, CARPAL BONE - revision right CMC arthroplasty with LRTI vs suture; distal scaphoid excision with possible tendon interposition and AIP (Right)  INTERPOSITION ARTHROPLASTY, CMC JOINT (Right)    Patient location: PACU    Anesthesia Type: MAC    Transport from OR: Transported from OR on 2-3 L/min O2 by NC with adequate spontaneous ventilation    Post pain: adequate analgesia    Post assessment: no apparent anesthetic complications and tolerated procedure well    Post vital signs: stable    Level of consciousness: awake    Nausea/Vomiting: no nausea/vomiting    Complications: none    Transfer of care protocol was followed      Last vitals: Visit Vitals  BP (!) 106/59 (BP Location: Left arm, Patient Position: Lying)   Pulse 61   Temp 36.3 °C (97.4 °F) (Temporal)   Resp 18   Ht 5' (1.524 m)   Wt 47.6 kg (105 lb)   SpO2 98%   Breastfeeding No   BMI 20.51 kg/m²

## 2024-05-24 NOTE — ANESTHESIA PROCEDURE NOTES
Peripheral Block    Patient location during procedure: pre-op   Block not for primary anesthetic.  Reason for block: at surgeon's request and post-op pain management   Post-op Pain Location: right arm and wrist      Staffing  Authorizing Provider: Chris Dukes MD  Performing Provider: Chris Dukes MD    Staffing  Performed by: Chris Dukes MD  Authorized by: Chris Dukes MD    Preanesthetic Checklist  Completed: patient identified, IV checked, site marked, risks and benefits discussed, surgical consent, monitors and equipment checked, pre-op evaluation and timeout performed  Peripheral Block  Patient position: supine  Prep: ChloraPrep  Patient monitoring: heart rate, cardiac monitor, continuous pulse ox, continuous capnometry and frequent blood pressure checks  Block type: supraclavicular  Laterality: right  Injection technique: single shot  Needle  Needle type: Stimuplex   Needle gauge: 22 G  Needle length: 2 in  Needle localization: anatomical landmarks and ultrasound guidance   -ultrasound image captured on disc.  Assessment  Injection assessment: negative aspiration, negative parasthesia and local visualized surrounding nerve  Paresthesia pain: none  Heart rate change: no  Slow fractionated injection: yes    Medications:    Medications: ropivacaine (NAROPIN) injection 0.5% - Perineural   20 mL - 5/24/2024 7:32:00 AM    Additional Notes  VSS.  [facility] RN monitoring vitals throughout procedure.  Patient tolerated procedure well.    Ropivacaine 0.5% 2mcg

## 2024-05-24 NOTE — ANESTHESIA PREPROCEDURE EVALUATION
05/24/2024  Saira Osuna is a 69 y.o., female with pmh of emphysema, GERD, and neck pain    Pre-operative evaluation for Procedure(s) (LRB):  OSTECTOMY, CARPAL BONE - revision right CMC arthroplasty with LRTI vs suture; distal scaphoid excision with possible tendon interposition and AIP (Right)  INTERPOSITION ARTHROPLASTY, CMC JOINT (Right)    Saira Osuna is a 69 y.o. female     Past Medical History:   Diagnosis Date    Constipation     Dependence on nocturnal oxygen therapy     2 liters/ nasal cannula    Depression     Emphysema lung     Encounter for blood transfusion     GERD (gastroesophageal reflux disease)     Migraines     Neck pain        Review of patient's allergies indicates:   Allergen Reactions    Penicillins Hives       No current facility-administered medications on file prior to encounter.     Current Outpatient Medications on File Prior to Encounter   Medication Sig Dispense Refill    aspirin (ECOTRIN) 81 MG EC tablet Take 81 mg by mouth once daily.      bisacodyL (DULCOLAX) 5 mg EC tablet Dulcolax (bisacodyl) 5 mg tablet,delayed release   take 2 (5mg) tablets as needed if no bowel movement for 2-3 days      calcium carbonate-vitamin D3 250-125 mg 250 mg-3.125 mcg (125 unit) Tab TAKE 2 TABLETS BY MOUTH EVERY DAY AS A MINERAL SUPPLEMENT      cyanocobalamin (VITAMIN B-12) 1000 MCG tablet TAKE ONE TABLET BY MOUTH EVERY DAY FOR VITAMIN DEFICIENCIES      ferrous sulfate (FEOSOL) 325 mg (65 mg iron) Tab tablet Iron (ferrous sulfate) 325 mg (65 mg iron) tablet   Take 1 tablet every other day by oral route.      hydrOXYzine (ATARAX) 50 MG tablet Take 50 mg by mouth.      LIDOcaine (XYLOCAINE) 5 % Oint ointment Apply topically.      methocarbamoL (ROBAXIN) 500 MG Tab TAKE ONE TABLET BY MOUTH THREE TIMES A DAY IF NEEDED (MUSCLE RELAXANT) (START WITH 1/2 TABLET AT BEDTIME THEN ADD IN MORNING DOSE AND IF   "TOLERATED THEN TAKE 1/2 TABLET THREE TIMES A DAY FOR A FEW DAYS AND THEN  CAN INCREASE TO A FULL TABLET IF NO SIDE EFFECTS) (MUSCLE RELAXANT)  (START WITH 1/2 TABLET AT BEDTIME THEN ADD IN MORNING DOSE AND IF   TOLERATED THEN TAKE 1/2 TABLET THREE TIMES A DAY FOR A FEW DAYS AND THEN   CAN INCREASE TO A FULL TABLET IF NO SIDE EFFECTS)      omeprazole (PRILOSEC) 40 MG capsule omeprazole 40 mg capsule,delayed release   Take 1 capsule every day by oral route.      sumatriptan (IMITREX) 100 MG tablet Take 100 mg by mouth every 2 (two) hours as needed for Migraine.      trazodone HCl (TRAZODONE ORAL) trazodone   100 mg  at night         Past Surgical History:   Procedure Laterality Date    AUGMENTATION OF BREAST      CERVICAL FUSION      COLONOSCOPY      COLONOSCOPY N/A 6/2/2022    Procedure: COLONOSCOPY;  Surgeon: Joni Burnett MD;  Location: [facility];  Service: Endoscopy;  Laterality: N/A;    COLONOSCOPY N/A 9/30/2022    Procedure: COLONOSCOPY;  Surgeon: Joni Burnett MD;  Location: [facility];  Service: Endoscopy;  Laterality: N/A;    ENDOSCOPIC RELEASE OF BOTH CARPAL TUNNELS      2 carpal surgeries on right, one on left    ESOPHAGEAL DILATION      ESOPHAGOGASTRODUODENOSCOPY N/A 02/08/2022    Procedure: EGD (ESOPHAGOGASTRODUODENOSCOPY);  Surgeon: Joni Burnett MD;  Location: [facility];  Service: Endoscopy;  Laterality: N/A;    INTERPOSITION ARTHROPLASTY OF CARPOMETACARPAL JOINTS Right     thumb    STRABISMUS SURGERY Bilateral 3/28/2024    Procedure: STRABISMUS SURGERY;  Surgeon: Devan Quevedo MD;  Location: [address];  Service: Ophthalmology;  Laterality: Bilateral;    TONSILLECTOMY      TOTAL HIP ARTHROPLASTY Bilateral     UPPER GASTROINTESTINAL ENDOSCOPY         CBC: No results for input(s): "WBC", "HGB", "HCT", "PLT" in the last 72 hours.    CMP: No results for input(s): "NA", "K", "CL", "CO2", "BUN", "CREATININE", "GLU", "MG", "PHOS", "CALCIUM", "ALBUMIN", "PROT", "ALKPHOS", "ALT", "AST", " ""BILITOT" in the last 72 hours.    COAGS  No results for input(s): "PT", "INR", "PROTIME", "APTT" in the last 72 hours.    BP Readings from Last 3 Encounters:   24 114/68   24 113/61   23 103/69       Diagnostic Studies:      EKD Echo:  No results found for this or any previous visit.      Pre-op Assessment    I have reviewed the Patient Summary Reports.     I have reviewed the Nursing Notes. I have reviewed the NPO Status.   I have reviewed the Medications.     Review of Systems  Anesthesia Hx:               Denies Personal Hx of Anesthesia complications.                    Pulmonary:   COPD         Chronic Obstructive Pulmonary Disease (COPD):                      Hepatic/GI:     GERD      Gerd          Neurological:      Headaches      Dx of Headaches                           Psych:  Psychiatric History                     Anesthesia Plan  Type of Anesthesia, risks & benefits discussed:    Anesthesia Type: Gen Natural Airway, Regional  Intra-op Monitoring Plan: Standard ASA Monitors  Post Op Pain Control Plan: multimodal analgesia  Induction:  IV  Airway Plan: Direct  Informed Consent: Informed consent signed with the Patient and all parties understand the risks and agree with anesthesia plan.  All questions answered.   ASA Score: 3  Day of Surgery Review of History & Physical: H&P Update referred to the surgeon/provider.    Ready For Surgery From Anesthesia Perspective.     .      "

## 2024-05-24 NOTE — DISCHARGE SUMMARY
[facility] - Surgery (Hospital)  Discharge Note  Short Stay    Procedure(s) (LRB):  OSTECTOMY, CARPAL BONE - revision right CMC arthroplasty with LRTI vs suture; distal scaphoid excision with possible tendon interposition and AIP (Right)  INTERPOSITION ARTHROPLASTY, CMC JOINT (Right)      OUTCOME: Patient tolerated treatment/procedure well without complication and is now ready for discharge.    DISPOSITION: Home or Self Care    FINAL DIAGNOSIS:  <principal problem not specified>    FOLLOWUP: In clinic    DISCHARGE INSTRUCTIONS:    Discharge Procedure Orders   Notify your health care provider if you experience any of the following:  increased confusion or weakness     Notify your health care provider if you experience any of the following:  persistent dizziness, light-headedness, or visual disturbances     Notify your health care provider if you experience any of the following:  worsening rash     Notify your health care provider if you experience any of the following:  severe persistent headache     Notify your health care provider if you experience any of the following:  difficulty breathing or increased cough     Notify your health care provider if you experience any of the following:  redness, tenderness, or signs of infection (pain, swelling, redness, odor or green/yellow discharge around incision site)     Notify your health care provider if you experience any of the following:  severe uncontrolled pain     Notify your health care provider if you experience any of the following:  persistent nausea and vomiting or diarrhea     Notify your health care provider if you experience any of the following:  temperature >100.4        TIME SPENT ON DISCHARGE: 5 minutes

## 2024-05-24 NOTE — H&P
[facility]  History & Physical  Orthopedics     SUBJECTIVE:       COVID-19 attestation:  This patient was treated during the COVID-19 pandemic.  This was discussed with the patient, they are aware of our current policies and procedures, were given the option of delaying their visit and or switching to a virtual visit, delaying their surgery when applicable, and they elect to proceed.     Chief Complaint:  Right thumb pain, right hand numbness and tingling     Referring Provider: No ref. provider found      History of Present Illness:  Patient is a 68 y.o. right hand dominant female who presents today with complaints of numbness and tingling in the right hand as well as some right thumb pain.  The patient has had multiple procedures on the right hand including 2 carpal tunnel releases done in 2017 and 2018.  These were followed by a reported right thumb CMC arthroplasty in approximately 2020.  This was all done in [place].  She does not have her records today.  She notes that unfortunately she is still suffering with numbness and tingling in the right hand which is constant to the thumb and index finger.  She also reports significant hypersensitivity in the distribution of the radial sensory nerve and at the prior thumb surgical site.  She is also having significant right thumb CMC basal joint pain which unfortunately did not improve to any significant extent after her initial thumb CMC surgery.  She presents for initial evaluation with no other complaints.     Interval history November 30, 2023: The patient returns today for re-evaluation.  She notes that her biggest issue at this point in time is persistent pain in the right thumb CMC and STT regions both volarly dorsally and radially.  She brings in some outside records today which demonstrate that she in 2019 had a proximal 1st metacarpal and distal trapezial partial excision with palmaris longus interposition and pinning.  She notes  that this really did not help her very much and certainly has not held up.  She does endorse once again constant numbness and tingling to her right thumb and index finger and is status post extensive cervical spine fusion from see 1 to T1 per her report as well as 2 carpal tunnel releases.  She presents for re-evaluation with no other complaints.  Her prior injection helped temporarily but has worn off and she would like to discuss additional options.     Interval history April 16, 2024: The patient returns today for re-evaluation.  She was previously scheduled for right thumb and hand surgery but unfortunately had to cancel this.  She now presents today noting that her pain persists and remains severe.  Pain is rated 7/10 in severity.  She would like to reschedule her planned surgery and has no other new complaints.     The patient is a/an retired.     Onset of symptoms/DOI was many years ago.     Symptoms are aggravated by activity and movement.     Symptoms are alleviated by rest.     Symptoms consist of pain and numbness/tingling.     The patient rates their pain as 7/10     Attempted treatment(s) and/or interventions include activity modifications, rest, steroid injection and surgical intervention.     The patient denies any fevers, chills, N/V, D/C and presents for evaluation.                Past Medical History:   Diagnosis Date    Constipation      Dependence on nocturnal oxygen therapy       2 liters/ nasal cannula    Depression      Emphysema lung      Encounter for blood transfusion      GERD (gastroesophageal reflux disease)      Migraines      Neck pain                  Past Surgical History:   Procedure Laterality Date    AUGMENTATION OF BREAST        CERVICAL FUSION        COLONOSCOPY        COLONOSCOPY N/A 6/2/2022     Procedure: COLONOSCOPY;  Surgeon: Joni Burnett MD;  Location: [facility];  Service: Endoscopy;  Laterality: N/A;    COLONOSCOPY N/A 9/30/2022     Procedure: COLONOSCOPY;  Surgeon:  Joni Burnett MD;  Location: [facility];  Service: Endoscopy;  Laterality: N/A;    ENDOSCOPIC RELEASE OF BOTH CARPAL TUNNELS         2 carpal surgeries on right, one on left    ESOPHAGEAL DILATION        ESOPHAGOGASTRODUODENOSCOPY N/A 02/08/2022     Procedure: EGD (ESOPHAGOGASTRODUODENOSCOPY);  Surgeon: Joni Burnett MD;  Location: [facility];  Service: Endoscopy;  Laterality: N/A;    INTERPOSITION ARTHROPLASTY OF CARPOMETACARPAL JOINTS Right       thumb    TONSILLECTOMY        TOTAL HIP ARTHROPLASTY Bilateral      UPPER GASTROINTESTINAL ENDOSCOPY                  Review of patient's allergies indicates:   Allergen Reactions    Penicillins Hives      Social History            Social History Narrative    Not on file                Family History   Problem Relation Age of Onset    Breast cancer Maternal Aunt      Colon cancer Neg Hx      Colon polyps Neg Hx      Crohn's disease Neg Hx      Ulcerative colitis Neg Hx      Stomach cancer Neg Hx      Rectal cancer Neg Hx              Current Outpatient Medications:     acetaminophen (TYLENOL) 325 MG tablet, Take 325 mg by mouth., Disp: , Rfl:     aspirin (ECOTRIN) 81 MG EC tablet, Take 81 mg by mouth once daily., Disp: , Rfl:     bisacodyL (DULCOLAX) 5 mg EC tablet, Dulcolax (bisacodyl) 5 mg tablet,delayed release  take 2 (5mg) tablets as needed if no bowel movement for 2-3 days, Disp: , Rfl:     buPROPion (WELLBUTRIN XL) 150 MG TB24 tablet, TAKE THREE TABLETS BY MOUTH EVERY DAY FOR DEPRESSION, Disp: , Rfl:     calcium carbonate-vitamin D3 250-125 mg 250 mg-3.125 mcg (125 unit) Tab, TAKE 2 TABLETS BY MOUTH EVERY DAY AS A MINERAL SUPPLEMENT, Disp: , Rfl:     cyanocobalamin (VITAMIN B-12) 1000 MCG tablet, TAKE ONE TABLET BY MOUTH EVERY DAY FOR VITAMIN DEFICIENCIES, Disp: , Rfl:     ferrous sulfate (FEOSOL) 325 mg (65 mg iron) Tab tablet, Iron (ferrous sulfate) 325 mg (65 mg iron) tablet  Take 1 tablet every other day by oral route., Disp: , Rfl:     hydrOXYzine  (ATARAX) 50 MG tablet, TAKE ONE TABLET BY MOUTH THREE TIMES A DAY AS NEEDED FOR ALLERGIES/ITCHING ANXIETY, Disp: , Rfl:     hydrOXYzine (ATARAX) 50 MG tablet, Take 50 mg by mouth., Disp: , Rfl:     LIDOcaine (XYLOCAINE) 5 % Oint ointment, Apply topically., Disp: , Rfl:     methocarbamoL (ROBAXIN) 500 MG Tab, TAKE ONE TABLET BY MOUTH THREE TIMES A DAY IF NEEDED (MUSCLE RELAXANT) (START WITH 1/2 TABLET AT BEDTIME THEN ADD IN MORNING DOSE AND IF  TOLERATED THEN TAKE 1/2 TABLET THREE TIMES A DAY FOR A FEW DAYS AND THEN  CAN INCREASE TO A FULL TABLET IF NO SIDE EFFECTS) (MUSCLE RELAXANT)  (START WITH 1/2 TABLET AT BEDTIME THEN ADD IN MORNING DOSE AND IF   TOLERATED THEN TAKE 1/2 TABLET THREE TIMES A DAY FOR A FEW DAYS AND THEN   CAN INCREASE TO A FULL TABLET IF NO SIDE EFFECTS), Disp: , Rfl:     omeprazole (PRILOSEC OTC) 20 MG tablet, Take 20 mg by mouth., Disp: , Rfl:     omeprazole (PRILOSEC) 40 MG capsule, omeprazole 40 mg capsule,delayed release  Take 1 capsule every day by oral route., Disp: , Rfl:     polyethylene glycol (GLYCOLAX) 17 gram/dose powder, Miralax 17 gram/dose oral powder  1 capful daily at bedtime, Disp: , Rfl:     sumatriptan (IMITREX) 100 MG tablet, Take 100 mg by mouth every 2 (two) hours as needed for Migraine., Disp: , Rfl:     trazodone HCl (TRAZODONE ORAL), trazodone  100 mg  at night, Disp: , Rfl:         Review of Systems:  As per HPI otherwise noncontributory     OBJECTIVE:       Vital Signs (Most Recent):  Vitals         Vitals:     11/30/23 1150   Weight: 47.6 kg (105 lb)   Height: 5' (1.524 m)         Body mass index is 20.51 kg/m².        Physical Exam:  Constitutional: The patient appears well-developed and well-nourished. No distress.   Skin: No lesions appreciated  Head: Normocephalic and atraumatic.   Nose: Nose normal.   Ears: No deformities seen  Eyes: Conjunctivae and EOM are normal.   Neck: No tracheal deviation present.   Cardiovascular: Normal rate and intact distal pulses.     Pulmonary/Chest: Effort normal. No respiratory distress.   Abdominal: There is no guarding.   Neurological: The patient is alert.   Psychiatric: The patient has a normal mood and affect.      Right Hand/Wrist Examination:     Observation/Inspection:  Swelling                       none                  Deformity                     none  Discoloration               none                  Scars                           extensile revision carpal tunnel, right thumb CMC all well healed                  Atrophy                        none     HAND/WRIST EXAMINATION:  Finkelstein's Test                                Neg  WHAT Test                                         Neg  Snuff box tenderness                          Neg  Ching's Test                                     Neg  Hook of Hamate Tenderness              Neg  CMC grind                                           positive  Circumduction test                              Positive  FCR intact  Tender to palpation at the distal scaphoid/STT joint     Neurovascular Exam:  Digits WWP, brisk CR < 3s throughout  NVI motor/LTS to M/R/U nerves, radial pulse 2+ except for hypersensitivity and decreased light touch sensation to the radial sensory distribution of the right dorsal thumb and hand as well as decreased light touch sensation to the right thumb and index finger median nerve distributions at baseline  Tinel's Test - Carpal Tunnel                Neg  Tinel's Test - Cubital Tunnel               Neg  Phalen's Test                                      Neg  Median Nerve Compression Test       Neg     ROM hand full, painless except the right thumb CMC/STT joints     ROM wrist full, painless    ROM elbow full, painless     Abdomen not guarded  Respirations nonlabored  Perfusion intact     Diagnostic Results:     Imaging - I independently viewed the patient's imaging as well as the radiology report.  Xrays of the patient's right hand  demonstrate no evidence of any  acute fractures or dislocations with postop changes of the right thumb CMC joint and severe STT arthritis and residual CMC arthritis versus subsidence.     EMG - none     ASSESSMENT/PLAN:       68 y.o. yo female with recurrent/persistent right thumb basal joint pain, STT arthritis, numbness tingling right hand  Plan: The patient and I had a thorough discussion today.  We discussed the working diagnosis as well as several other potential alternative diagnoses.  Treatment options were discussed, both conservative and surgical.  Conservative treatment options would include things such as activity modifications, workplace modifications, a period of rest, oral vs topical OTC and prescription anti-inflammatory medications, occupational therapy, splinting/bracing, immobilization, corticosteroid injections, and others.  Surgical options were discussed as well.      At this time, Saira would like to proceed with rescheduling her previously planned and scheduled surgical intervention.  This will consist of a revision right thumb CMC arthroplasty with possible LR TI versus suture suspension, possible tendon harvest, distal scaphoid excision with possible tendon interposition and any other indicated procedures at the time of surgery on May 10th, 2024 which I feel is reasonable.  She understands that this will not be expected to improve her numbness and tingling in the right hand and I certainly can not guarantee any results given the revision nature of this and other factors.  She would like to proceed and we will get this rescheduled.       The patient has not responded to adequate non operative treatment at this time and/or non operative treatment is not indicated. Thus, the risks, benefits and alternatives to surgery were discussed with the patient in detail.  Specific risks include but are not limited to bleeding, infection, vessel and/or nerve damage, pain, numbness, tingling, compartment syndrome, need for additional  surgery, failure to return to pre-injury and/or preoperative functional status, inability to return to work, scar sensitivity, delayed healing, complex regional pain syndrome, weakness, pulley injury, tendon injury, bowstringing, partial and/or incomplete relief of symptoms, persistence of and/or worsening of symptoms, hardware and/or surgical failure, prominent and/or symptomatic hardware possibly necessitating future removal, osteomyelitis, amputation, loss of function, stiffness, rotational malalignment, functional debility, dysfunction, decreased  strength, need for prolonged postoperative rehabilitation, malunion, nonunion, deep venous thrombosis, pulmonary embolism, arthritis and death.  The patient states an understanding and wishes to proceed with surgery.   All questions were answered.  No guarantees were implied or stated.  Written informed consent was obtained.       Hunter Smith M.D.     Please be aware that this note has been generated with the assistance of [employer] voice-to-text.  Please excuse any spelling or grammatical errors.     Thank you for choosing Dr. Hunter Smith for your orthopedic hand and upper extremity care. It is our goal to provide you with exceptional care that will help keep you healthy, active, and get you back in the game.     If you felt that you received exemplary care today, please consider leaving feedback for Dr. Smith on [email or web address] at [email or web address].     Please do not hesitate to reach out to us via email, phone, or MyChart with any questions, concerns, or feedback.

## 2024-05-24 NOTE — PLAN OF CARE
Patient is AAO and VSS.  Tolerating PO and states pain is tolerable.  Dressing CDI.  Patient states they are ready for d/c.  IV removed.  Catheter tip intact.  Caregiver at bedside.  Discharge instructions reviewed and copy given to the patient and caregiver.  Questions answered.  Both verbalized understanding. Medication delivered to bedside. no DME needed.  Patient wheeled to car by RN.

## 2024-05-26 NOTE — ANESTHESIA POSTPROCEDURE EVALUATION
Anesthesia Post Evaluation    Patient: Saira Osuna    Procedure(s) Performed: Procedure(s) (LRB):  OSTECTOMY, CARPAL BONE - revision right CMC arthroplasty with LRTI vs suture; distal scaphoid excision with possible tendon interposition and AIP (Right)  INTERPOSITION ARTHROPLASTY, CMC JOINT (Right)    Final Anesthesia Type: general      Patient location during evaluation: PACU  Patient participation: Yes- Able to Participate  Level of consciousness: awake and alert  Post-procedure vital signs: reviewed and stable  Pain management: adequate  Airway patency: patent    PONV status at discharge: No PONV  Anesthetic complications: no      Cardiovascular status: hemodynamically stable  Hydration status: euvolemic  Follow-up not needed.              Vitals Value Taken Time   /70 05/24/24 1130   Temp 36.4 °C (97.6 °F) 05/24/24 1130   Pulse 58 05/24/24 1130   Resp 32 05/24/24 1130   SpO2 97 % 05/24/24 1130         Event Time   Out of Recovery 11:01:00         Pain/Alfredo Score: No data recorded

## 2024-05-28 LAB
FINAL PATHOLOGIC DIAGNOSIS: NORMAL
GROSS: NORMAL
Lab: NORMAL

## 2024-05-29 ENCOUNTER — PATIENT MESSAGE (OUTPATIENT)
Dept: ORTHOPEDICS | Facility: CLINIC | Age: 69
End: 2024-05-29
Payer: OTHER GOVERNMENT

## 2024-05-29 NOTE — OP NOTE
DATE OF PROCEDURE:  05/24/2024     SERVICE:  Orthopedics.     SURGEON:  Hunter Smith M.D.     ASSISTANT:  RAO Nichole (RES).     PREOPERATIVE DIAGNOSIS:    1)  Arthritis, right thumb carpometacarpal joint status post prior CMC arthroplasty  2)  failure with subsidence and collapse of right thumb carpometacarpal joint arthroplasty.  3)  Scaphotrapeziotrapezoid (STT) arthritis right wrist     POSTOPERATIVE DIAGNOSIS:    1)  same     PROCEDURES PERFORMED:  1.  Revision CMC arthroplasty, right thumb, CPT code 83638  2.  Ligament reconstruction with tendon transfer, autologous flexor   carpi radialis tendon, right thumb CPT code 13721  3.  Excision carpal bone, distal scaphoid CPT code 94531  4.  Interposition arthroplasty, right STT joint CPT code 74006  4.   22 modifier     COMPLEX PROCEDURE:    There was an altered surgical field. There was abnormal anatomy. There was major scarring due to the revision nature of the case to the area around the neurovascular structures and myotendinous units of the right hand and wrist. Complexity of the service was much greater than the normative procedure.  There was increased time, intensity and technical difficulty of the procedure, severity of the patient's condition and mental effort required.  This was a highly complicated procedure that required advanced surgical skill in order to safely and technically perform it.  Nevertheless the outcome achieved was excellent.       ANESTHESIA:  Regional, MAC     ESTIMATED BLOOD LOSS:  5 mL.     IMPLANTS:  Arthrex 3x8 mm Bio-Tenodesis screw as well as an Arthrex micro corkscrew suture anchor for the interpositional arthroplasty.     EXPLANTS:  None.     SPECIMENS:  The excised distal scaphoid bone was sent for pathology.     TOURNIQUET TIME:  1 hour and 26 minutes at 250 mmHg.     PACKS AND DRAINS:  None.     COMPLICATIONS:  None.     INTRAVENOUS FLUIDS:  Crystalloid.     INDICATIONS FOR PROCEDURE:  The patient is a 69-year-old female who    previously presented to the [facility]opedics Clinic complaining of significant pain and dysfunction regards to her right hand.  She had previously undergone a CMC arthroplasty and this had failed with subsidence of her 1st metacarpal as well as concomitant STT arthritis.  This was severely limiting the patient's activities   of daily living, and thus the   risks, benefits, and alternatives to operative intervention were discussed with   the patient in detail.  To date, nonoperative treatments were  ineffective in managing the symptoms.  The risks, benefits, and alternatives of surgical intervention were explained to the patient   in detail.  Specific risks discussed included, but were not limited to   bleeding, infection, vessel and/or nerve damage, pain, numbness, tingling,   compartment syndrome, need for additional surgery, failure to return to   pre-injury and/or preoperative functional status, inability to return to work,   scar sensitivity, delayed healing, complex regional pain syndrome, weakness,   tendon injury, partial and/or incomplete relief of symptoms, persistence of   and/or worsening of symptoms, hardware and/or surgical failure, prominent and/or   symptomatic hardware possibly necessitating future removal, osteomyelitis,   amputation, loss of function, stiffness, functional debility, dysfunction,   decreased  strength, need for prolonged postoperative rehabilitation,   fracture, DVT, PE, arthritis, and death.  The patient agreed to the risks as   described and consented for the procedure.     PROCEDURE IN DETAIL:  On the date of the operative intervention, the patient was   evaluated in the preoperative holding area.  With the patient's participation, the right   arm was marked as the operative site. The patient was then administered regional anesthesia   and taken to the OR Room where they were placed on the OR table with all   superficial bony prominences well-padded.  Nonsterile tourniquet was  placed high   on the patient's right upper arm and the operative upper extremity was then prepped and   draped in the usual sterile fashion.  Timeout was taken and confirmed by all   present to confirm the correct patient, site, procedure, and administration of   preoperative antibiotics.  All were in agreement, so I proceeded.  I marked out   an approximately 6 cm incision dorsal radially over the patient's right thumb CMC   joint.  The Esmarch was   then utilized to exsanguinate the right upper extremity and tourniquet was   insufflated to 250 mmHg.   The incision was made sharply with a scalpel and   dissection was carried down to skin and subcutaneous tissues. Branches of   the superficial radial nerve were identified and retracted out of harm's way for   the duration of the procedure.  Dissection was continued more deeply to the   level of the radial styloid.  The first dorsal compartment sheath was then   released.  The interval between the   extensor pollicis longus and the extensor pollicis brevis was then utilized to   gain access to the base of the thumb metacarpal and the trapezial space.    Dissection was carried more deeply to the level of the anatomic snuffbox and the   radial artery was identified as it made a dorsal turn.  There was significant scar tissue in the area from the prior surgery an additional time skill and physical and mental effort was required to safely execute the procedure.  The radial artery was then isolated   and retracted and protected throughout the duration of the procedure as well.    The joint capsule to the first CMC joint was then entered sharply with a scalpel   and the CMC joint was identified.  There was some remaining trapezium and this was sharply and completely excised and passed off the field for pathology.  At this point in time I had excellent visualization of the 1st metacarpal base as well as the distal scaphoid, trapezoid, and FCR in the floor of the trapezial  space.  I identified some previous suture from her prior surgery and removed this.  Loading of the 1st metacarpal demonstrated that it was in fact impinging upon the distal scaphoid and decision was made to proceed with a revision CMC arthroplasty.  Prior to revising the CMC arthroplasty, however, I decided to proceed with the planned distal scaphoid excision.  I identified the scaphoid and prepared for distal scaphoid excision.  The planned site of the osteotomy was marked with a Pierre wire and checked under fluoroscopic guidance.  I was very pleased with the positioning and then a microsagittal saw was utilized to make the distal scaphoid osteotomy.  The distal scaphoid bone was then excised and contoured with a rongeur to prevent any sharp bony prominences.  Having completed my distal scaphoid excision I then once again identified the FCR tendon.  This was intact and suitable for interpositional arthroplasty.  I proceeded accordingly.       Thus, I made a 1 cm transverse incision in   the distal third of the forearm overlying the flexor carpi radialis tendon.  The   tendon was isolated and then sharply transected to facilitate harvest.    After   the tendon was harvested, the tendon harvest incision was closed with staples.    Turning my attention back towards the trapeziectomy bed, I then used a right   angle clamp to pull the FCR tendon into the wound, which was done so without any   difficulty.  It was then split in two-thirds and one-third portions with the   smaller portion to be utilized for the ligament reconstruction and the larger   portion to be utilized for the tendon interposition.  The Arthrex kit was then   opened and utilized.  A guidewire was placed, followed by overdrilling with the Arthrex drill in the base of the metacarpal.   A   second drill hole exiting the articular surface of the metacarpal was created to prepare the for   ligament reconstruction.  A Hewson suture passer   was then  utilized to bring our ligament through the metacarpal and then the   appropriate tension and positioning of the thumb metacarpal was held manually as   the Bio-Tenodesis screw was placed.  This was placed without any difficulty and   had good purchase and secured our tendon well.  Remaining tendon was then   removed and a 4-0 Ethibond was utilized to place a running suture through the   larger portion of the harvested flexor carpi radialis tendon for our Anchovy   tendon interposition.  At this point in time an Arthrex micro corkscrew anchor was placed in the remainder of the distal scaphoid and 2-0 FiberWire was utilized to secure our interpositional arthroplasty and FCR anchovy graft to the distal scaphoid for interposition.  This had excellent fill and was in very good position.  At this point in time, we irrigated   the wound thoroughly with sterile saline.  A 3-0 Vicryl suture was then utilized   to close the joint capsule of the CMC joint.  The radial artery was inspected and remained intact.  The wound was then   again copiously irrigated with sterile saline and closed in layered fashion with    4-0 Vicryl and 4-0 nylon to close the skin.  Sterile dressings were then applied, consisting of   Xeroform, 4 x 4 gauze, and a thumb spica splint to the right upper extremity.  The tourniquet was   deflated, at which point brisk capillary refill ensued throughout the patient's   operative hand and specifically to the thumb and index fingers.  There was no   significant bleeding.    The patient was then awakened from anesthesia and returned to the Postanesthesia   Care Unit in stable condition.  There were no complications, and as the   attending surgeon, I was present and performed the critical portions of the   procedure.       PLAN FOR THE PATIENT:  The patient will be discharged home in stable condition.   The patient will remain nonweightbearing to right upper extremity at this time.  I will   see the patient back  in approximately two weeks for reevaluation of the postoperative   plan.

## 2024-05-30 RX ORDER — HYDROCODONE BITARTRATE AND ACETAMINOPHEN 7.5; 325 MG/1; MG/1
1 TABLET ORAL EVERY 6 HOURS PRN
Qty: 28 TABLET | Refills: 0 | Status: SHIPPED | OUTPATIENT
Start: 2024-05-30 | End: 2024-06-12

## 2024-05-31 ENCOUNTER — PATIENT MESSAGE (OUTPATIENT)
Dept: ORTHOPEDICS | Facility: CLINIC | Age: 69
End: 2024-05-31
Payer: OTHER GOVERNMENT

## 2024-06-01 ENCOUNTER — HOSPITAL ENCOUNTER (EMERGENCY)
Facility: HOSPITAL | Age: 69
Discharge: HOME | End: 2024-06-01
Attending: EMERGENCY MEDICINE
Payer: OTHER GOVERNMENT

## 2024-06-01 VITALS
TEMPERATURE: 98 F | BODY MASS INDEX: 21.09 KG/M2 | OXYGEN SATURATION: 98 % | HEART RATE: 69 BPM | WEIGHT: 108 LBS | RESPIRATION RATE: 16 BRPM | SYSTOLIC BLOOD PRESSURE: 153 MMHG | DIASTOLIC BLOOD PRESSURE: 59 MMHG

## 2024-06-01 DIAGNOSIS — G89.18 POST-OP PAIN: Primary | ICD-10-CM

## 2024-06-01 PROCEDURE — 99283 EMERGENCY DEPT VISIT LOW MDM: CPT | Mod: 25

## 2024-06-01 NOTE — ED PROVIDER NOTES
Encounter Date: 6/1/2024       History     Chief Complaint   Patient presents with    Post-op Problem     Pt had surgery on her thumb on 5/24, reports a shooting sharp pain followed by swelling in thumb today.       Patient is a 69 year old female who presents with left hand pain. He has PMH significant for GERD and depression. Patient has surgery done on 5/24 by Dr. Smith. She states today she felt a sharp pain then had swelling to the thumb. She reports pain has been persistent since surgery. No redness or drainage. No fever.     The history is provided by the patient.     Review of patient's allergies indicates:   Allergen Reactions    Penicillins Hives     Past Medical History:   Diagnosis Date    Constipation     Dependence on nocturnal oxygen therapy     2 liters/ nasal cannula    Depression     Emphysema lung     Encounter for blood transfusion     GERD (gastroesophageal reflux disease)     Migraines     Neck pain      Past Surgical History:   Procedure Laterality Date    AUGMENTATION OF BREAST      CERVICAL FUSION      COLONOSCOPY      COLONOSCOPY N/A 6/2/2022    Procedure: COLONOSCOPY;  Surgeon: Joni Burnett MD;  Location: [facility];  Service: Endoscopy;  Laterality: N/A;    COLONOSCOPY N/A 9/30/2022    Procedure: COLONOSCOPY;  Surgeon: Joni Burnett MD;  Location: [facility];  Service: Endoscopy;  Laterality: N/A;    ENDOSCOPIC RELEASE OF BOTH CARPAL TUNNELS      2 carpal surgeries on right, one on left    ESOPHAGEAL DILATION      ESOPHAGOGASTRODUODENOSCOPY N/A 02/08/2022    Procedure: EGD (ESOPHAGOGASTRODUODENOSCOPY);  Surgeon: Joni Burnett MD;  Location: [facility];  Service: Endoscopy;  Laterality: N/A;    INTERPOSITION ARTHROPLASTY OF CARPOMETACARPAL JOINTS Right     thumb    INTERPOSITION ARTHROPLASTY OF CARPOMETACARPAL JOINTS Right 5/24/2024    Procedure: INTERPOSITION ARTHROPLASTY, CMC JOINT;  Surgeon: Hunter Smith MD;  Location: [facility];  Service: Orthopedics;  Laterality: Right;     STRABISMUS SURGERY Bilateral 3/28/2024    Procedure: STRABISMUS SURGERY;  Surgeon: Devan Quevedo MD;  Location: [facility] OR [address] [place] [address];  Service: Ophthalmology;  Laterality: Bilateral;    SURGICAL REMOVAL OF CARPAL BONE Right 2024    Procedure: OSTECTOMY, CARPAL BONE - revision right CMC arthroplasty with LRTI vs suture; distal scaphoid excision with possible tendon interposition and AIP;  Surgeon: Hunter Smith MD;  Location: [facility];  Service: Orthopedics;  Laterality: Right;    TONSILLECTOMY      TOTAL HIP ARTHROPLASTY Bilateral     UPPER GASTROINTESTINAL ENDOSCOPY       Family History   Problem Relation Name Age of Onset    Breast cancer Maternal Aunt      Colon cancer Neg Hx      Colon polyps Neg Hx      Crohn's disease Neg Hx      Ulcerative colitis Neg Hx      Stomach cancer Neg Hx      Rectal cancer Neg Hx       Social History     Tobacco Use    Smoking status: Former     Current packs/day: 0.00     Types: Cigarettes     Quit date: 1990     Years since quittin.3    Smokeless tobacco: Never   Substance Use Topics    Alcohol use: Yes     Comment: rarely    Drug use: Never     Review of Systems   Constitutional:  Negative for fever.   Respiratory:  Negative for shortness of breath.    Genitourinary:  Negative for flank pain.   Musculoskeletal:  Positive for arthralgias. Negative for gait problem.   Skin:  Positive for wound.   Neurological:  Negative for weakness.   Psychiatric/Behavioral:  Negative for confusion.        Physical Exam     Initial Vitals [24 1115]   BP Pulse Resp Temp SpO2   (!) 149/63 (!) 58 17 97.9 °F (36.6 °C) 97 %      MAP       --         Physical Exam    Nursing note and vitals reviewed.  Constitutional: She appears well-developed and well-nourished. She is not diaphoretic. No distress.   HENT:   Head: Normocephalic and atraumatic.   Cardiovascular:  Intact distal pulses.           Musculoskeletal:         General: Tenderness present. Normal range of motion.       Comments: Well healing incision to the right wrist with no erythema, drainage or warmth. Soft compartments. 5/5  strength. Normal sensation. Ecchymosis noted. Mild swelling to distal thumb with no tenderness or erythema.     Neurological: She is alert and oriented to person, place, and time. She has normal strength. No sensory deficit.   Skin: Skin is warm and dry. No rash and no abscess noted. No erythema.   Psychiatric: She has a normal mood and affect.         ED Course   Procedures  Labs Reviewed - No data to display       Imaging Results              X-Ray Hand 3 view Right (Final result)  Result time 06/01/24 13:06:36      Final result by Domingo Lechuga MD (06/01/24 13:06:36)                   Narrative:    EXAMINATION:  XR HAND COMPLETE 3 VIEW RIGHT    CLINICAL HISTORY:  hand pain;    TECHNIQUE:  PA, lateral, and oblique views of the right hand were performed.    COMPARISON:  04/18/2024    FINDINGS:  In the interval since prior patient has undergone revision of the CMC arthroplasty, distal scaphoid resection and interposition arthroplasty at the right triscaphe joint.    There is a headless fixation screw at the proximal pole scaphoid.  Widening of the 1st CMC joint is presumed postsurgical.  No malalignment elsewhere.    Between mild-to-moderate degenerative change at several IP joints.  Volar distal forearm skin staples.      Electronically signed by: Domingo Lechuga  Date:    06/01/2024  Time:    13:06                                     Medications - No data to display  Medical Decision Making  Emergent evaluation of a 69-year-old female who presents with persistent postoperative pain.  She has no swelling, erythema or warmth concerning for infectious process.  She has soft compartments.  Incision is well healing.  Normal strength and sensation.  X-ray shows no acute changes.  Recommend she follow up closely with surgeon this week.  She voiced understanding.  Return precautions given. Discussed  results with patient. Return precautions given. Based on my clinical evaluation, I do not appreciate any immediate, emergent, or life threatening condition or etiology that warrants additional workup today and feel that the patient can be discharged with close follow up care.  Patient is to follow up with their primary care provider. Case was discussed with Dr. Phillips who is in agreement with the plan of care. All questions answered.       Amount and/or Complexity of Data Reviewed  External Data Reviewed: notes.  Radiology: ordered.              Attending Attestation:     Physician Attestation Statement for NP/PA:   I personally made/approved the management plan and take responsibility for the patient management.    Other NP/PA Attestation Additions:    History of Present Illness: 69-year-old female presents with hand pain.    Medical Decision Making: Initial differential diagnosis included but not limited to postoperative pain, cellulitis, and postoperative infection.  I am in agreement with the physician assistant assessment, treatment, and plan of care.                                    Clinical Impression:  Final diagnoses:  [G89.18] Post-op pain (Primary)          ED Disposition Condition    Discharge Stable          ED Prescriptions    None       Follow-up Information       Follow up With Specialties Details Why Contact Info Additional Information    [facility] Appointment Line  Schedule an appointment as soon as possible for a visit  For follow up if you don't have a primary care provider [phone]     [facility] Emergency Medicine  As needed [address] [place] [address] [address] [place] [address] 1st floor             Marlene Horner PA-C  06/01/24 [id number]       Rudy Phillips MD  06/01/24 [id number]

## 2024-06-01 NOTE — DISCHARGE INSTRUCTIONS
Elevate hand. Keep splint in place. Take pain medication as prescribed. Call your surgeon on Monday to notify him. Return for any new or worsening symptoms.

## 2024-06-03 ENCOUNTER — OFFICE VISIT (OUTPATIENT)
Dept: ORTHOPEDICS | Facility: CLINIC | Age: 69
End: 2024-06-03
Payer: OTHER GOVERNMENT

## 2024-06-03 VITALS — WEIGHT: 108 LBS | BODY MASS INDEX: 21.2 KG/M2 | HEIGHT: 60 IN

## 2024-06-03 DIAGNOSIS — M19.031 ARTHRITIS OF SCAPHOID-TRAPEZIUM-TRAPEZOID JOINT OF RIGHT HAND: ICD-10-CM

## 2024-06-03 DIAGNOSIS — M18.11 ARTHRITIS OF CARPOMETACARPAL (CMC) JOINT OF RIGHT THUMB: Primary | ICD-10-CM

## 2024-06-03 PROCEDURE — 99024 POSTOP FOLLOW-UP VISIT: CPT | Mod: ,,, | Performed by: ORTHOPAEDIC SURGERY

## 2024-06-03 PROCEDURE — 99213 OFFICE O/P EST LOW 20 MIN: CPT | Mod: PBBFAC | Performed by: ORTHOPAEDIC SURGERY

## 2024-06-03 PROCEDURE — 99999 PR PBB SHADOW E&M-EST. PATIENT-LVL III: CPT | Mod: PBBFAC,,, | Performed by: ORTHOPAEDIC SURGERY

## 2024-06-03 NOTE — PROGRESS NOTES
Saira Osuna presents for post-operative evaluation.  The patient is now Two weeks s/p  the following:     PROCEDURES PERFORMED:  1.  Revision CMC arthroplasty, right thumb, CPT code 60276  2.  Ligament reconstruction with tendon transfer, autologous flexor   carpi radialis tendon, right thumb CPT code 78133  3.  Excision carpal bone, distal scaphoid CPT code 16852  4.  Interposition arthroplasty, right STT joint CPT code 68808  4.   22 modifier    She has been having some postop pain.  This prompted a trip to a local ED several days ago for persistent pain and   A  sudden popping sensation with some bruising in the thumb. Today she notes that her pain is reasonably well controlled.  She does note today that she has been having some numbness to the right thumb ever since her initial surgery and this has been stable since her recent procedure with me.  She returns for postop evaluation with no other complaints.    PE:    AA&O x 4.  NAD  HEENT:  NCAT, sclera nonicteric  Lungs:  Respirations are equal and unlabored.  CV:  2+ bilateral upper and lower extremity pulses.  MSK: The wound is healing well with no signs of erythema or warmth.  There is no drainage.  No clinical signs or symptoms of infection are present.  All sutures were removed today.  Thumb IP flexion and extension is intact.      Imaging: X-rays from her recent ED visit demonstrate expected postop changes with no acute complications    A/P: Status post above, doing well  1) Continue with  none weight bearing.  We will fax referral to [facility]  2) F/U   For weeks with new x-rays right hand or sooner for any problems  3) Call with any questions/concerns in the interim    Please be aware that this note has been generated with the assistance of [facility] voice-to-text.  Please excuse any spelling or grammatical errors.

## 2024-06-05 DIAGNOSIS — Z12.31 ENCOUNTER FOR SCREENING MAMMOGRAM FOR HIGH-RISK PATIENT: Primary | ICD-10-CM

## 2024-06-10 ENCOUNTER — PATIENT MESSAGE (OUTPATIENT)
Dept: ORTHOPEDICS | Facility: CLINIC | Age: 69
End: 2024-06-10
Payer: OTHER GOVERNMENT

## 2024-06-10 DIAGNOSIS — M18.11 ARTHRITIS OF CARPOMETACARPAL (CMC) JOINT OF RIGHT THUMB: Primary | ICD-10-CM

## 2024-06-12 ENCOUNTER — TELEPHONE (OUTPATIENT)
Dept: OTOLARYNGOLOGY | Facility: CLINIC | Age: 69
End: 2024-06-12
Payer: OTHER GOVERNMENT

## 2024-06-12 NOTE — TELEPHONE ENCOUNTER
----- Message from Yumiko Aponte MA sent at 6/12/2024  3:46 PM CDT -----  Regarding: FW: referral    ----- Message -----  From: Treasure Chaidez  Sent: 6/12/2024   3:42 PM CDT  To: [facility] Clinical Staff  Subject: referral                                         Good Evening      Dr. FLOYD would like to refer the following patient in the ENT department. The patients diagnosis is Deviated nasal septum. I have scanned the patients referral and records into .       Thank you,   Treasure  [facility]

## 2024-06-13 RX ORDER — HYDROCODONE BITARTRATE AND ACETAMINOPHEN 5; 325 MG/1; MG/1
1 TABLET ORAL EVERY 6 HOURS PRN
Qty: 28 TABLET | Refills: 0 | OUTPATIENT
Start: 2024-06-13

## 2024-06-13 RX ORDER — HYDROCODONE BITARTRATE AND ACETAMINOPHEN 5; 325 MG/1; MG/1
1 TABLET ORAL EVERY 6 HOURS PRN
Qty: 28 TABLET | Refills: 0 | Status: SHIPPED | OUTPATIENT
Start: 2024-06-13

## 2024-06-20 ENCOUNTER — HOSPITAL ENCOUNTER (OUTPATIENT)
Dept: RADIOLOGY | Facility: CLINIC | Age: 69
Discharge: HOME | End: 2024-06-20
Attending: FAMILY MEDICINE
Payer: OTHER GOVERNMENT

## 2024-06-20 DIAGNOSIS — Z12.31 ENCOUNTER FOR SCREENING MAMMOGRAM FOR HIGH-RISK PATIENT: ICD-10-CM

## 2024-06-20 PROCEDURE — 77067 SCR MAMMO BI INCL CAD: CPT | Mod: 26,,, | Performed by: RADIOLOGY

## 2024-06-20 PROCEDURE — 77063 BREAST TOMOSYNTHESIS BI: CPT | Mod: 26,,, | Performed by: RADIOLOGY

## 2024-06-20 PROCEDURE — 77067 SCR MAMMO BI INCL CAD: CPT | Mod: TC,PO

## 2024-06-26 ENCOUNTER — OFFICE VISIT (OUTPATIENT)
Dept: OTOLARYNGOLOGY | Facility: CLINIC | Age: 69
End: 2024-06-26
Payer: OTHER GOVERNMENT

## 2024-06-26 VITALS
WEIGHT: 110.69 LBS | BODY MASS INDEX: 21.61 KG/M2 | DIASTOLIC BLOOD PRESSURE: 78 MMHG | SYSTOLIC BLOOD PRESSURE: 120 MMHG | HEART RATE: 68 BPM

## 2024-06-26 DIAGNOSIS — J34.89 NASAL OBSTRUCTION: ICD-10-CM

## 2024-06-26 DIAGNOSIS — J34.2 NASAL SEPTAL DEVIATION: ICD-10-CM

## 2024-06-26 DIAGNOSIS — Z01.818 PRE-OP TESTING: ICD-10-CM

## 2024-06-26 DIAGNOSIS — M95.0 NASAL VALVE COLLAPSE: Primary | ICD-10-CM

## 2024-06-26 DIAGNOSIS — J34.3 NASAL TURBINATE HYPERTROPHY: Primary | ICD-10-CM

## 2024-06-26 DIAGNOSIS — M95.0 NASAL VALVE COLLAPSE: ICD-10-CM

## 2024-06-26 DIAGNOSIS — J34.3 NASAL TURBINATE HYPERTROPHY: ICD-10-CM

## 2024-06-26 PROCEDURE — 99214 OFFICE O/P EST MOD 30 MIN: CPT | Mod: PBBFAC | Performed by: OTOLARYNGOLOGY

## 2024-06-26 PROCEDURE — 99999 PR PBB SHADOW E&M-EST. PATIENT-LVL IV: CPT | Mod: PBBFAC,,, | Performed by: OTOLARYNGOLOGY

## 2024-06-26 PROCEDURE — 99204 OFFICE O/P NEW MOD 45 MIN: CPT | Mod: S$PBB,,, | Performed by: OTOLARYNGOLOGY

## 2024-06-26 NOTE — PROGRESS NOTES
Ms. Osuna     Vitals:    24 1040   BP: 120/78   Pulse: 68       Chief Complaint:  Other (Pt has states she has a deviated septum and wants to see if it could be corrected. Also has trouble breathing )       HPI:   is a 69-year-old white female who presents with complaints of nasal airway obstruction.  She states that she has had some nasal trauma from falls in the past though she did not seek medical treatment.  Significant past history is positive for a sinus surgery in  when she was in [place].  She denies any allergy symptomatology or recurrent sinus infections.  She has used Flonase in the past however she has not on this now.  She has not using any saline sinus rinses.    SNOT22- 42 NOSE- 90    Review of Systems:  Constitutional:   weight loss or weight gain: Negative  Allergy/Immunologic:   Negative  Nasal Congestion/Obstruction:   Positive as above  Nosebleeds:   Positive  Sinus infections:   Negative  Headache/Facial Pain:   Negative  Snoring/MONIKA:   Positive for snoring and sleep disturbance  Throat: Infections/Pain:   Negative  Hoarseness/Speech Disturbance:   Negative  Trauma Hx:  Negative    Cardiovascular:  M/I Angina: Negative  Hypertension: Negative  Endocrine:    DM/Steroids: Negative  GI:   Dysphagia/Reflux:  Reported positive for reflux disease  :   GYN Pregnancy: Negative  Renal:   Dialysis: Negative  Lymphatic:   Neck Mass/Lymphadenopathy: Negative  Muscoloskeletal:   Positive history of cervical fusion  Hematologic:   Bleeding Disorders/Anemia: Negative  Neurologic:    Cranial/Neuralgia: Negative  Pulmonary:   Asthma/SOB/Cough: Negative  Skin Disorders: Negative    Past Medical/Surgical/Family/Social History:    ENT Surgery:  Status post previous sinus surgery and tonsillectomy  Occupational Exposure: Negative   Problems: Negative  Cancer: Negative    Past Family History:   Family history of Cancer: Negative    Past Social History:   Tobacco:  Former smoker who quit in  1990   Alcohol:  Once monthly Social Drinker      Allergies and medications: Reviewed per med card.    Physical Examination:  Ears:   External auditory canals:  Clear   Hearing: Grossly intact   Tympanic Membranes: Clear  Nose:   External:  Dorsal deviation to the right with left internal valve collapse and positive Prasanna maneuver.   Intranasal:  Marked septal deviation to the left with 2+ turbinates.  All of these deformities together creating 80% obstruction.  Mouth:   Intraorally: Lips, teeth, and gums: Normal   Oropharynx: Normal   Mucosa: Normal   Tongue: Normal  Throat:      Palate: Normal palate with elevation   Tonsils:  Absent   Posterior Pharynx: Normal  Fiberoptic exam: Not performed  Head/Face:     Inspection: Normal and atraumatic   Palpation/Percussion: Non tender   Facial strength: Normal and symmetric   Salivary glands: Normal  Neck: Supple  Thyroid: No masses  Lymphatics: No nodes  Respiratory:   Effort: Normal  Eyes:   Ocular Mobility: Normal   Vision: Grossly intact  Neuro/Psych:   Cranial Nerves: Grossly Intact   Orientation: Normal   Mood/Affect: Normal      Assessment/Plan:  I have discussed my findings with her in detail as well as my recommendations for treatment.  I have recommended that she begin daily saline sinus rinses and I have reviewed how this is to be administered with her in detail.  I have also suggested a trial of nasal steroid sprays such as Flonase or Nasacort and I have described how this is to be administered as well.  Surgically we discussed that she could benefit from nasal reconstruction with possible osteotomies, left  graft, septoplasty, submucous resection of turbinates.  Have discussed the pros and cons risks and benefits as well as technical aspects of this procedure in detail with her.  She understands and accepts these and wishes to proceed with scheduling.  I will send her for medical photographs as well.

## 2024-06-26 NOTE — LETTER
June 26, 2024      [facility]  [address] [facility]  [place] [address]  Phone: [phone]       Patient: Saira Osuna   YOB: 1955  Date of Visit: 06/26/2024    To Whom It May Concern:    Marisa Osuna  was at [facility] on 06/26/2024. If you have any questions or concerns, or if I can be of further assistance, please do not hesitate to contact me.    Sincerely,    Dr Lima

## 2024-07-02 ENCOUNTER — PATIENT MESSAGE (OUTPATIENT)
Dept: ORTHOPEDICS | Facility: CLINIC | Age: 69
End: 2024-07-02
Payer: OTHER GOVERNMENT

## 2024-07-09 ENCOUNTER — HOSPITAL ENCOUNTER (OUTPATIENT)
Dept: RADIOLOGY | Facility: HOSPITAL | Age: 69
Discharge: HOME | End: 2024-07-09
Attending: ORTHOPAEDIC SURGERY
Payer: OTHER GOVERNMENT

## 2024-07-09 ENCOUNTER — OFFICE VISIT (OUTPATIENT)
Dept: ORTHOPEDICS | Facility: CLINIC | Age: 69
End: 2024-07-09
Payer: OTHER GOVERNMENT

## 2024-07-09 VITALS — WEIGHT: 110.69 LBS | BODY MASS INDEX: 21.73 KG/M2 | HEIGHT: 60 IN

## 2024-07-09 DIAGNOSIS — M18.11 ARTHRITIS OF CARPOMETACARPAL (CMC) JOINT OF RIGHT THUMB: Primary | ICD-10-CM

## 2024-07-09 DIAGNOSIS — M79.641 RIGHT HAND PAIN: ICD-10-CM

## 2024-07-09 DIAGNOSIS — M79.641 RIGHT HAND PAIN: Primary | ICD-10-CM

## 2024-07-09 PROCEDURE — 73130 X-RAY EXAM OF HAND: CPT | Mod: TC,RT

## 2024-07-09 PROCEDURE — 99212 OFFICE O/P EST SF 10 MIN: CPT | Mod: PBBFAC,25 | Performed by: ORTHOPAEDIC SURGERY

## 2024-07-09 PROCEDURE — 99999 PR PBB SHADOW E&M-EST. PATIENT-LVL II: CPT | Mod: PBBFAC,,, | Performed by: ORTHOPAEDIC SURGERY

## 2024-07-09 PROCEDURE — 99024 POSTOP FOLLOW-UP VISIT: CPT | Mod: ,,, | Performed by: ORTHOPAEDIC SURGERY

## 2024-07-09 PROCEDURE — 73130 X-RAY EXAM OF HAND: CPT | Mod: 26,RT,, | Performed by: RADIOLOGY

## 2024-07-09 NOTE — PROGRESS NOTES
Saira Osuna presents for post-operative evaluation.  The patient is now six weeks s/p  the following:     PROCEDURES PERFORMED:  1.  Revision CMC arthroplasty, right thumb, CPT code 98617  2.  Ligament reconstruction with tendon transfer, autologous flexor   carpi radialis tendon, right thumb CPT code 37194  3.  Excision carpal bone, distal scaphoid CPT code 93710  4.  Interposition arthroplasty, right STT joint CPT code 55360  4.   22 modifier      She is doing much better.  She notes that her numbness is now resolved.  She does not feel completely recovered but certainly she feels better than she did before surgery.  She is seeing [facility] in [place].  No other significant complaints today.    PE:    AA&O x 4.  NAD  HEENT:  NCAT, sclera nonicteric  Lungs:  Respirations are equal and unlabored.  CV:  2+ bilateral upper and lower extremity pulses.  MSK: The wound is healing well with no signs of erythema or warmth.  There is no drainage.  No clinical signs or symptoms of infection are present.  Thumb IP flexion and extension is intact.  The patient can make a full composite fist without difficulty.      Imaging: X-rays   Of the patient's right hand demonstrate expected postop changes with no evidence of any complication    A/P: Status post above, doing well  1)  patient may transition out of her brace over the next week to 2.  Range motion as tolerated.  Begin strengthening in 1-2 weeks.  Follow up in 6 weeks with new x-rays or sooner for any problems.  Doing well.    Please be aware that this note has been generated with the assistance of "[employer] voice-to-text.  Please excuse any spelling or grammatical errors.

## 2024-07-17 ENCOUNTER — PATIENT MESSAGE (OUTPATIENT)
Dept: ORTHOPEDICS | Facility: CLINIC | Age: 69
End: 2024-07-17
Payer: OTHER GOVERNMENT

## 2024-07-18 RX ORDER — HYDROCODONE BITARTRATE AND ACETAMINOPHEN 5; 325 MG/1; MG/1
1 TABLET ORAL EVERY 6 HOURS PRN
Qty: 28 TABLET | Refills: 0 | Status: SHIPPED | OUTPATIENT
Start: 2024-07-18

## 2024-07-22 ENCOUNTER — PATIENT MESSAGE (OUTPATIENT)
Dept: PREADMISSION TESTING | Facility: OTHER | Age: 69
End: 2024-07-22
Payer: OTHER GOVERNMENT

## 2024-08-06 ENCOUNTER — LAB VISIT (OUTPATIENT)
Dept: LAB | Facility: HOSPITAL | Age: 69
End: 2024-08-06
Attending: OTOLARYNGOLOGY
Payer: OTHER GOVERNMENT

## 2024-08-06 DIAGNOSIS — Z01.818 PRE-OP TESTING: ICD-10-CM

## 2024-08-06 DIAGNOSIS — J34.2 NASAL SEPTAL DEVIATION: ICD-10-CM

## 2024-08-06 DIAGNOSIS — J34.89 NASAL OBSTRUCTION: ICD-10-CM

## 2024-08-06 DIAGNOSIS — M95.0 NASAL VALVE COLLAPSE: ICD-10-CM

## 2024-08-06 DIAGNOSIS — J34.3 NASAL TURBINATE HYPERTROPHY: ICD-10-CM

## 2024-08-06 LAB
ALBUMIN SERPL BCP-MCNC: 3.8 G/DL (ref 3.5–5.2)
ALP SERPL-CCNC: 69 U/L (ref 55–135)
ALT SERPL W/O P-5'-P-CCNC: 14 U/L (ref 10–44)
ANION GAP SERPL CALC-SCNC: 8 MMOL/L (ref 8–16)
AST SERPL-CCNC: 16 U/L (ref 10–40)
BASOPHILS # BLD AUTO: 0.09 K/UL (ref 0–0.2)
BASOPHILS NFR BLD: 1.7 % (ref 0–1.9)
BILIRUB SERPL-MCNC: 0.3 MG/DL (ref 0.1–1)
BUN SERPL-MCNC: 20 MG/DL (ref 8–23)
CALCIUM SERPL-MCNC: 9.2 MG/DL (ref 8.7–10.5)
CHLORIDE SERPL-SCNC: 108 MMOL/L (ref 95–110)
CO2 SERPL-SCNC: 25 MMOL/L (ref 23–29)
CREAT SERPL-MCNC: 0.8 MG/DL (ref 0.5–1.4)
DIFFERENTIAL METHOD BLD: ABNORMAL
EOSINOPHIL # BLD AUTO: 0.1 K/UL (ref 0–0.5)
EOSINOPHIL NFR BLD: 2.4 % (ref 0–8)
ERYTHROCYTE [DISTWIDTH] IN BLOOD BY AUTOMATED COUNT: 13.1 % (ref 11.5–14.5)
EST. GFR  (NO RACE VARIABLE): >60 ML/MIN/1.73 M^2
GLUCOSE SERPL-MCNC: 90 MG/DL (ref 70–110)
HCT VFR BLD AUTO: 41.3 % (ref 37–48.5)
HGB BLD-MCNC: 12.8 G/DL (ref 12–16)
IMM GRANULOCYTES # BLD AUTO: 0.01 K/UL (ref 0–0.04)
IMM GRANULOCYTES NFR BLD AUTO: 0.2 % (ref 0–0.5)
LYMPHOCYTES # BLD AUTO: 1.3 K/UL (ref 1–4.8)
LYMPHOCYTES NFR BLD: 23.5 % (ref 18–48)
MCH RBC QN AUTO: 30.5 PG (ref 27–31)
MCHC RBC AUTO-ENTMCNC: 31 G/DL (ref 32–36)
MCV RBC AUTO: 99 FL (ref 82–98)
MONOCYTES # BLD AUTO: 0.4 K/UL (ref 0.3–1)
MONOCYTES NFR BLD: 6.8 % (ref 4–15)
NEUTROPHILS # BLD AUTO: 3.6 K/UL (ref 1.8–7.7)
NEUTROPHILS NFR BLD: 65.4 % (ref 38–73)
NRBC BLD-RTO: 0 /100 WBC
PLATELET # BLD AUTO: 338 K/UL (ref 150–450)
PLATELET FUNCTION ASSAY - EPINEPHRINE: 93 SECS (ref 76–199)
PMV BLD AUTO: 9.4 FL (ref 9.2–12.9)
POTASSIUM SERPL-SCNC: 4.2 MMOL/L (ref 3.5–5.1)
PROT SERPL-MCNC: 7 G/DL (ref 6–8.4)
RBC # BLD AUTO: 4.19 M/UL (ref 4–5.4)
SODIUM SERPL-SCNC: 141 MMOL/L (ref 136–145)
WBC # BLD AUTO: 5.45 K/UL (ref 3.9–12.7)

## 2024-08-06 PROCEDURE — 36415 COLL VENOUS BLD VENIPUNCTURE: CPT | Performed by: OTOLARYNGOLOGY

## 2024-08-06 PROCEDURE — 85576 BLOOD PLATELET AGGREGATION: CPT | Performed by: OTOLARYNGOLOGY

## 2024-08-06 PROCEDURE — 80053 COMPREHEN METABOLIC PANEL: CPT | Performed by: OTOLARYNGOLOGY

## 2024-08-06 PROCEDURE — 85025 COMPLETE CBC W/AUTO DIFF WBC: CPT | Performed by: OTOLARYNGOLOGY

## 2024-08-08 ENCOUNTER — TELEPHONE (OUTPATIENT)
Dept: OTOLARYNGOLOGY | Facility: CLINIC | Age: 69
End: 2024-08-08
Payer: OTHER GOVERNMENT

## 2024-08-20 ENCOUNTER — OFFICE VISIT (OUTPATIENT)
Dept: ORTHOPEDICS | Facility: CLINIC | Age: 69
End: 2024-08-20
Payer: OTHER GOVERNMENT

## 2024-08-20 ENCOUNTER — TELEPHONE (OUTPATIENT)
Dept: OTOLARYNGOLOGY | Facility: CLINIC | Age: 69
End: 2024-08-20
Payer: OTHER GOVERNMENT

## 2024-08-20 VITALS — BODY MASS INDEX: 21.73 KG/M2 | HEIGHT: 60 IN | WEIGHT: 110.69 LBS

## 2024-08-20 DIAGNOSIS — Z01.818 PRE-OP TESTING: Primary | ICD-10-CM

## 2024-08-20 DIAGNOSIS — M18.11 ARTHRITIS OF CARPOMETACARPAL (CMC) JOINT OF RIGHT THUMB: Primary | ICD-10-CM

## 2024-08-20 PROCEDURE — 99024 POSTOP FOLLOW-UP VISIT: CPT | Mod: ,,, | Performed by: ORTHOPAEDIC SURGERY

## 2024-08-20 PROCEDURE — 99213 OFFICE O/P EST LOW 20 MIN: CPT | Mod: PBBFAC | Performed by: ORTHOPAEDIC SURGERY

## 2024-08-20 PROCEDURE — 99999 PR PBB SHADOW E&M-EST. PATIENT-LVL III: CPT | Mod: PBBFAC,,, | Performed by: ORTHOPAEDIC SURGERY

## 2024-08-20 NOTE — TELEPHONE ENCOUNTER
----- Message from Ashley Mcnamara MA sent at 8/20/2024 11:19 AM CDT -----  Contact: self    ----- Message -----  From: Sunil Brice MA  Sent: 8/20/2024  11:10 AM CDT  To: Ashley Mcnamara MA    Patient requesting reschedule attempted to call her no answer  ----- Message -----  From: Malina Lloyd  Sent: 8/20/2024   9:31 AM CDT  To: Clarence ARCHULETA III Staff    Type: Appointment Reschedule Request  Name of Caller: Self  When is the first available appointment? Pt is scheduled for 8/29 / pt would like to r/s   Symptoms:  SEPTOPLASTY, NOSE, WITH NASAL TURBINATE REDUCTION [[id number]]  RECONSTRUCTION, NASAL VALVE [4615]  APPLICATION, CARTILAGE GRAFT [2810]   Would the patient rather a call back or a response via [facility]?  Call   Best Call Back Number: [phone]  Please call to advise... Thank you...

## 2024-08-20 NOTE — PROGRESS NOTES
Saira Osuna presents for post-operative evaluation.  The patient is now 12 weeks s/p the following:     PROCEDURES PERFORMED:  1.  Revision CMC arthroplasty, right thumb, CPT code 84094  2.  Ligament reconstruction with tendon transfer, autologous flexor   carpi radialis tendon, right thumb CPT code 46926  3.  Excision carpal bone, distal scaphoid CPT code 56681  4.  Interposition arthroplasty, right STT joint CPT code 40212  4.   22 modifier      She is doing much well. Numbness is now resolved.   She is still working on strengthening but feels much better than she did prior to her revision surgery.  Would like to keep attending therapy and reports that her visits are almost out.    PE:    AA&O x 4.  NAD  HEENT:  NCAT, sclera nonicteric  Lungs:  Respirations are equal and unlabored.  CV:  2+ bilateral upper and lower extremity pulses.  MSK: The wound is healing well with no signs of erythema or warmth.  There is no drainage.  No clinical signs or symptoms of infection are present.  Thumb IP flexion and extension is intact.  The patient can make a full composite fist without difficulty.  Full painless range motion today.  Right upper extremity neurovascularly intact.      Imaging: X-rays   Of the patient's right hand demonstrate expected postop changes with no evidence of any complication    A/P: Status post above, doing well  1)    At this point in time Saira is doing well.  Continue therapy.  New referral sent.  No restrictions.  Follow up in 3 months for re-evaluation or sooner for any problems.    Please be aware that this note has been generated with the assistance of [employer] voice-to-text.  Please excuse any spelling or grammatical errors.

## 2024-08-20 NOTE — TELEPHONE ENCOUNTER
----- Message from Ashley Mcnamara MA sent at 8/20/2024  2:11 PM CDT -----  Regarding: FW: Reschedule 8/29  Contact: [phone]    ----- Message -----  From: Aleksandar Phillip  Sent: 8/20/2024   2:09 PM CDT  To: Clarence ARCHULETA III Staff  Subject: Reschedule 8/29                                  Patient is returning a missed call from [facility]. Please call to further discuss.

## 2024-11-07 ENCOUNTER — TELEPHONE (OUTPATIENT)
Dept: OTOLARYNGOLOGY | Facility: CLINIC | Age: 69
End: 2024-11-07
Payer: OTHER GOVERNMENT

## 2024-11-07 NOTE — TELEPHONE ENCOUNTER
----- Message from Kristin sent at 11/7/2024  9:22 AM CST -----  Regarding: Procedure questions  Contact: [phone]  Type:  Needs Medical Advice    Who Called: Saira  Would the patient rather a call back or a response via [facility]? Call  Best Call Back Number: [phone]  Additional Information: Patient is calling in ref to arrival time  
Left message for patient stating to arrive at 8 am and that pre-op nurses will contact her today with other instructions.   
Is This A New Presentation, Or A Follow-Up?: Skin Lesion
What Type Of Note Output Would You Prefer (Optional)?: Bullet Format
How Severe Is Your Skin Lesion?: moderate
Has Your Skin Lesion Been Treated?: not been treated

## 2024-11-07 NOTE — PRE-PROCEDURE INSTRUCTIONS
Patient was informed of pre-procedure instructions and arrival time. Pt verbalized understanding and is to be accompanied by daughter. PATIENT CONFIRMED ARRIVAL TIME OF 0815.    Patient denies taking any over-the-counter vitamins, supplements, nsaids or aspirin products for 7 days.    Patient denies taking GLP-1 injection for 7 days.

## 2024-11-08 ENCOUNTER — HOSPITAL ENCOUNTER (OUTPATIENT)
Facility: HOSPITAL | Age: 69
Discharge: HOME | End: 2024-11-08
Attending: OTOLARYNGOLOGY | Admitting: OTOLARYNGOLOGY
Payer: OTHER GOVERNMENT

## 2024-11-08 ENCOUNTER — ANESTHESIA EVENT (OUTPATIENT)
Dept: SURGERY | Facility: HOSPITAL | Age: 69
End: 2024-11-08
Payer: OTHER GOVERNMENT

## 2024-11-08 ENCOUNTER — ANESTHESIA (OUTPATIENT)
Dept: SURGERY | Facility: HOSPITAL | Age: 69
End: 2024-11-08
Payer: OTHER GOVERNMENT

## 2024-11-08 VITALS
HEART RATE: 72 BPM | SYSTOLIC BLOOD PRESSURE: 114 MMHG | TEMPERATURE: 99 F | WEIGHT: 110.69 LBS | RESPIRATION RATE: 17 BRPM | HEIGHT: 60 IN | OXYGEN SATURATION: 94 % | BODY MASS INDEX: 21.73 KG/M2 | DIASTOLIC BLOOD PRESSURE: 64 MMHG

## 2024-11-08 DIAGNOSIS — J32.9 CHRONIC SINUSITIS: ICD-10-CM

## 2024-11-08 DIAGNOSIS — H25.13 AGE-RELATED NUCLEAR CATARACT OF BOTH EYES: Primary | ICD-10-CM

## 2024-11-08 PROCEDURE — 71000015 HC POSTOP RECOV 1ST HR: Performed by: OTOLARYNGOLOGY

## 2024-11-08 PROCEDURE — 27201423 OPTIME MED/SURG SUP & DEVICES STERILE SUPPLY: Performed by: OTOLARYNGOLOGY

## 2024-11-08 PROCEDURE — 63600175 PHARM REV CODE 636 W HCPCS: Performed by: OTOLARYNGOLOGY

## 2024-11-08 PROCEDURE — 25000003 PHARM REV CODE 250: Performed by: OTOLARYNGOLOGY

## 2024-11-08 PROCEDURE — 37000008 HC ANESTHESIA 1ST 15 MINUTES: Performed by: OTOLARYNGOLOGY

## 2024-11-08 PROCEDURE — 71000016 HC POSTOP RECOV ADDL HR: Performed by: OTOLARYNGOLOGY

## 2024-11-08 PROCEDURE — 63600175 PHARM REV CODE 636 W HCPCS: Performed by: NURSE ANESTHETIST, CERTIFIED REGISTERED

## 2024-11-08 PROCEDURE — 63600175 PHARM REV CODE 636 W HCPCS: Performed by: ANESTHESIOLOGY

## 2024-11-08 PROCEDURE — 36000709 HC OR TIME LEV III EA ADD 15 MIN: Performed by: OTOLARYNGOLOGY

## 2024-11-08 PROCEDURE — 71000033 HC RECOVERY, INTIAL HOUR: Performed by: OTOLARYNGOLOGY

## 2024-11-08 PROCEDURE — 30465 REPAIR NASAL STENOSIS: CPT | Mod: ,,, | Performed by: OTOLARYNGOLOGY

## 2024-11-08 PROCEDURE — 30140 RESECT INFERIOR TURBINATE: CPT | Mod: 50,51,, | Performed by: OTOLARYNGOLOGY

## 2024-11-08 PROCEDURE — 25000003 PHARM REV CODE 250: Performed by: NURSE ANESTHETIST, CERTIFIED REGISTERED

## 2024-11-08 PROCEDURE — 94761 N-INVAS EAR/PLS OXIMETRY MLT: CPT

## 2024-11-08 PROCEDURE — 99900035 HC TECH TIME PER 15 MIN (STAT)

## 2024-11-08 PROCEDURE — 37000009 HC ANESTHESIA EA ADD 15 MINS: Performed by: OTOLARYNGOLOGY

## 2024-11-08 PROCEDURE — 30520 REPAIR OF NASAL SEPTUM: CPT | Mod: 51,,, | Performed by: OTOLARYNGOLOGY

## 2024-11-08 PROCEDURE — 36000708 HC OR TIME LEV III 1ST 15 MIN: Performed by: OTOLARYNGOLOGY

## 2024-11-08 RX ORDER — CLINDAMYCIN HYDROCHLORIDE 300 MG/1
300 CAPSULE ORAL 3 TIMES DAILY
Qty: 21 CAPSULE | Refills: 0 | Status: SHIPPED | OUTPATIENT
Start: 2024-11-08 | End: 2024-11-15

## 2024-11-08 RX ORDER — KETAMINE HCL IN 0.9 % NACL 50 MG/5 ML
SYRINGE (ML) INTRAVENOUS
Status: DISCONTINUED | OUTPATIENT
Start: 2024-11-08 | End: 2024-11-08

## 2024-11-08 RX ORDER — PROPOFOL 10 MG/ML
VIAL (ML) INTRAVENOUS
Status: DISCONTINUED | OUTPATIENT
Start: 2024-11-08 | End: 2024-11-08

## 2024-11-08 RX ORDER — ARIPIPRAZOLE 10 MG/1
5 TABLET ORAL
COMMUNITY
Start: 2024-08-02

## 2024-11-08 RX ORDER — FAMOTIDINE 10 MG/ML
INJECTION INTRAVENOUS
Status: DISCONTINUED | OUTPATIENT
Start: 2024-11-08 | End: 2024-11-08

## 2024-11-08 RX ORDER — ROCURONIUM BROMIDE 10 MG/ML
INJECTION, SOLUTION INTRAVENOUS
Status: DISCONTINUED | OUTPATIENT
Start: 2024-11-08 | End: 2024-11-08

## 2024-11-08 RX ORDER — ZOLPIDEM TARTRATE 6.25 MG/1
6.25 TABLET, FILM COATED, EXTENDED RELEASE ORAL
COMMUNITY
Start: 2024-08-02

## 2024-11-08 RX ORDER — NEOSTIGMINE METHYLSULFATE 0.5 MG/ML
INJECTION INTRAVENOUS
Status: DISCONTINUED | OUTPATIENT
Start: 2024-11-08 | End: 2024-11-08

## 2024-11-08 RX ORDER — DEXAMETHASONE SODIUM PHOSPHATE 4 MG/ML
INJECTION, SOLUTION INTRA-ARTICULAR; INTRALESIONAL; INTRAMUSCULAR; INTRAVENOUS; SOFT TISSUE
Status: DISCONTINUED | OUTPATIENT
Start: 2024-11-08 | End: 2024-11-08

## 2024-11-08 RX ORDER — CELECOXIB 50 MG/1
100 CAPSULE ORAL
COMMUNITY
Start: 2024-09-08

## 2024-11-08 RX ORDER — HYDROMORPHONE HYDROCHLORIDE 1 MG/ML
0.4 INJECTION, SOLUTION INTRAMUSCULAR; INTRAVENOUS; SUBCUTANEOUS EVERY 5 MIN PRN
Status: DISCONTINUED | OUTPATIENT
Start: 2024-11-08 | End: 2024-11-08 | Stop reason: HOSPADM

## 2024-11-08 RX ORDER — OXYMETAZOLINE HCL 0.05 %
SPRAY, NON-AEROSOL (ML) NASAL
Status: DISCONTINUED | OUTPATIENT
Start: 2024-11-08 | End: 2024-11-08 | Stop reason: HOSPADM

## 2024-11-08 RX ORDER — GLUCAGON 1 MG
1 KIT INJECTION
Status: DISCONTINUED | OUTPATIENT
Start: 2024-11-08 | End: 2024-11-08 | Stop reason: HOSPADM

## 2024-11-08 RX ORDER — LIDOCAINE HYDROCHLORIDE 20 MG/ML
INJECTION INTRAVENOUS
Status: DISCONTINUED | OUTPATIENT
Start: 2024-11-08 | End: 2024-11-08

## 2024-11-08 RX ORDER — ONDANSETRON HYDROCHLORIDE 2 MG/ML
INJECTION, SOLUTION INTRAVENOUS
Status: DISCONTINUED | OUTPATIENT
Start: 2024-11-08 | End: 2024-11-08

## 2024-11-08 RX ORDER — EPINEPHRINE 1 MG/ML
INJECTION INTRAMUSCULAR; INTRAVENOUS; SUBCUTANEOUS
Status: DISCONTINUED | OUTPATIENT
Start: 2024-11-08 | End: 2024-11-08 | Stop reason: HOSPADM

## 2024-11-08 RX ORDER — ONDANSETRON 4 MG/1
8 TABLET, ORALLY DISINTEGRATING ORAL EVERY 8 HOURS PRN
Qty: 20 TABLET | Refills: 0 | Status: SHIPPED | OUTPATIENT
Start: 2024-11-08

## 2024-11-08 RX ORDER — OXYCODONE HYDROCHLORIDE 5 MG/1
5 TABLET ORAL
Status: DISCONTINUED | OUTPATIENT
Start: 2024-11-08 | End: 2024-11-08 | Stop reason: HOSPADM

## 2024-11-08 RX ORDER — HYDROMORPHONE HYDROCHLORIDE 1 MG/ML
0.5 INJECTION, SOLUTION INTRAMUSCULAR; INTRAVENOUS; SUBCUTANEOUS EVERY 5 MIN PRN
Status: DISCONTINUED | OUTPATIENT
Start: 2024-11-08 | End: 2024-11-08 | Stop reason: HOSPADM

## 2024-11-08 RX ORDER — PROPOFOL 10 MG/ML
VIAL (ML) INTRAVENOUS CONTINUOUS PRN
Status: DISCONTINUED | OUTPATIENT
Start: 2024-11-08 | End: 2024-11-08

## 2024-11-08 RX ORDER — HYDROCODONE BITARTRATE AND ACETAMINOPHEN 5; 325 MG/1; MG/1
1 TABLET ORAL EVERY 6 HOURS PRN
Qty: 15 TABLET | Refills: 0 | Status: SHIPPED | OUTPATIENT
Start: 2024-11-08

## 2024-11-08 RX ORDER — ACETAMINOPHEN 10 MG/ML
INJECTION, SOLUTION INTRAVENOUS
Status: DISCONTINUED | OUTPATIENT
Start: 2024-11-08 | End: 2024-11-08

## 2024-11-08 RX ORDER — BACITRACIN ZINC 500 UNIT/G
OINTMENT (GRAM) TOPICAL
Status: DISCONTINUED | OUTPATIENT
Start: 2024-11-08 | End: 2024-11-08 | Stop reason: HOSPADM

## 2024-11-08 RX ORDER — ONDANSETRON HYDROCHLORIDE 2 MG/ML
4 INJECTION, SOLUTION INTRAVENOUS DAILY PRN
Status: DISCONTINUED | OUTPATIENT
Start: 2024-11-08 | End: 2024-11-08 | Stop reason: HOSPADM

## 2024-11-08 RX ORDER — FENTANYL CITRATE 50 UG/ML
INJECTION, SOLUTION INTRAMUSCULAR; INTRAVENOUS
Status: DISCONTINUED | OUTPATIENT
Start: 2024-11-08 | End: 2024-11-08

## 2024-11-08 RX ORDER — LIDOCAINE HYDROCHLORIDE AND EPINEPHRINE 10; 10 UG/ML; MG/ML
INJECTION, SOLUTION INFILTRATION; PERINEURAL
Status: DISCONTINUED | OUTPATIENT
Start: 2024-11-08 | End: 2024-11-08 | Stop reason: HOSPADM

## 2024-11-08 RX ADMIN — ACETAMINOPHEN 1000 MG: 10 INJECTION INTRAVENOUS at 10:11

## 2024-11-08 RX ADMIN — LIDOCAINE HYDROCHLORIDE 60 MG: 20 INJECTION INTRAVENOUS at 10:11

## 2024-11-08 RX ADMIN — GLYCOPYRROLATE 0.4 MG: 0.2 INJECTION INTRAMUSCULAR; INTRAVENOUS at 11:11

## 2024-11-08 RX ADMIN — ONDANSETRON 4 MG: 2 INJECTION INTRAMUSCULAR; INTRAVENOUS at 09:11

## 2024-11-08 RX ADMIN — PROPOFOL 150 MCG/KG/MIN: 10 INJECTION, EMULSION INTRAVENOUS at 10:11

## 2024-11-08 RX ADMIN — ONDANSETRON 4 MG: 2 INJECTION INTRAMUSCULAR; INTRAVENOUS at 12:11

## 2024-11-08 RX ADMIN — PROPOFOL 130 MG: 10 INJECTION, EMULSION INTRAVENOUS at 10:11

## 2024-11-08 RX ADMIN — FENTANYL CITRATE 50 MCG: 50 INJECTION, SOLUTION INTRAMUSCULAR; INTRAVENOUS at 11:11

## 2024-11-08 RX ADMIN — HYDROMORPHONE HYDROCHLORIDE 0.5 MG: 1 INJECTION, SOLUTION INTRAMUSCULAR; INTRAVENOUS; SUBCUTANEOUS at 12:11

## 2024-11-08 RX ADMIN — FENTANYL CITRATE 50 MCG: 50 INJECTION, SOLUTION INTRAMUSCULAR; INTRAVENOUS at 10:11

## 2024-11-08 RX ADMIN — ROCURONIUM BROMIDE 40 MG: 10 INJECTION INTRAVENOUS at 10:11

## 2024-11-08 RX ADMIN — DEXAMETHASONE SODIUM PHOSPHATE 8 MG: 4 INJECTION INTRA-ARTICULAR; INTRALESIONAL; INTRAMUSCULAR; INTRAVENOUS; SOFT TISSUE at 10:11

## 2024-11-08 RX ADMIN — FAMOTIDINE 20 MG: 10 INJECTION, SOLUTION INTRAVENOUS at 09:11

## 2024-11-08 RX ADMIN — GLYCOPYRROLATE 0.1 MG: 0.2 INJECTION INTRAMUSCULAR; INTRAVENOUS at 09:11

## 2024-11-08 RX ADMIN — Medication 10 MG: at 10:11

## 2024-11-08 RX ADMIN — NEOSTIGMINE METHYLSULFATE 2.5 MG: 0.5 INJECTION INTRAVENOUS at 11:11

## 2024-11-08 NOTE — PLAN OF CARE
VSS. Discharge instructions reviewed with patient, patient verbalizes understanding. No complaints of pain or discomfort.  PIV removed without difficulty and catheter intact. Patient discharged home safely to family via wheelchair.      Patient's daughter has called and is on the way to  patient.

## 2024-11-08 NOTE — ANESTHESIA PREPROCEDURE EVALUATION
11/08/2024  Saira Osuna is a 69 y.o., female.      Pre-op Assessment    I have reviewed the Patient Summary Reports.     I have reviewed the Nursing Notes. I have reviewed the NPO Status.      Review of Systems  Anesthesia Hx:              Personal Hx of Anesthesia complications, Post-Operative Nausea/Vomiting, with every anesthetic, treatment not known                    Cardiovascular:  Exercise tolerance: good                                             Pulmonary:   COPD         Chronic Obstructive Pulmonary Disease (COPD):                      Hepatic/GI:     GERD         Gerd          Neurological:      Headaches      Dx of Headaches                           Psych:  Psychiatric History              Physical Exam  General: Well nourished    Airway:  Mallampati: II   Mouth Opening: Normal  Neck ROM: Normal ROM    Dental:  Intact    Anesthesia Plan  Type of Anesthesia, risks & benefits discussed:    Anesthesia Type: Gen ETT  Informed Consent: Informed consent signed with the Patient and all parties understand the risks and agree with anesthesia plan.  All questions answered.   ASA Score: 2    Ready For Surgery From Anesthesia Perspective.   .

## 2024-11-08 NOTE — PLAN OF CARE
Pt in preop bay 39, VSS and IV inserted. Pt denies any open wounds on body or the use of any weight loss injections. Pt needs procedural consents, an H&P and an admit order, otherwise ready to roll.

## 2024-11-08 NOTE — BRIEF OP NOTE
[facility]  Brief Operative Note    Surgery Date: 11/8/2024     Surgeons and Role:     * Erwin Lima III, MD - Primary    Assisting Surgeon: None    Pre-op Diagnosis:  Nasal valve collapse [M95.0]  Nasal septal deviation [J34.2]  Nasal turbinate hypertrophy [J34.3]  Nasal obstruction [J34.89]    Post-op Diagnosis:  Post-Op Diagnosis Codes:     * Nasal valve collapse [J34.829]     * Nasal septal deviation [J34.2]     * Nasal turbinate hypertrophy [J34.3]     * Nasal obstruction [J34.89]    Procedure(s) (LRB):  RECONSTRUCTION, NASAL VALVE (Bilateral)  APPLICATION, CARTILAGE GRAFT (Bilateral)  FESS, WITH NASAL SEPTOPLASTY (Bilateral)  EXCISION, NASAL TURBINATE, SUBMUCOSAL (Bilateral)    Anesthesia: General    Operative Findings: see full op note    Estimated Blood Loss: * No values recorded between 11/8/2024 10:19 AM and 11/8/2024 11:55 AM *         Specimens:   Specimen (24h ago, onward)      None              Discharge Note    OUTCOME: Patient tolerated treatment/procedure well without complication and is now ready for discharge.    DISPOSITION: Home or Self Care    FINAL DIAGNOSIS:     * Nasal valve collapse [J34.829]     * Nasal septal deviation [J34.2]     * Nasal turbinate hypertrophy [J34.3]     * Nasal obstruction [J34.89]    FOLLOWUP: In clinic    DISCHARGE INSTRUCTIONS: In patient discharge tab

## 2024-11-08 NOTE — TRANSFER OF CARE
Anesthesia Transfer of Care Note    Patient: Saira Osuna    Procedure(s) Performed: Procedure(s) (LRB):  RECONSTRUCTION, NASAL VALVE (Bilateral)  APPLICATION, CARTILAGE GRAFT (Bilateral)  FESS, WITH NASAL SEPTOPLASTY (Bilateral)  EXCISION, NASAL TURBINATE, SUBMUCOSAL (Bilateral)    Patient location: PACU    Anesthesia Type: general    Transport from OR: Transported from OR on 6-10 L/min O2 by face mask with adequate spontaneous ventilation    Post pain: adequate analgesia    Post assessment: no apparent anesthetic complications    Post vital signs: stable    Level of consciousness: awake    Complications: none    Transfer of care protocol was followed      Last vitals: Visit Vitals  /78   Pulse 66   Temp 36.8 °C (98.2 °F) (Temporal)   Resp 18   Ht 5' (1.524 m)   Wt 50.2 kg (110 lb 10.7 oz)   SpO2 98%   Breastfeeding No   BMI 21.61 kg/m²

## 2024-11-08 NOTE — ANESTHESIA POSTPROCEDURE EVALUATION
Anesthesia Post Evaluation    Patient: Saira Osuna    Procedure(s) Performed: Procedure(s) (LRB):  RECONSTRUCTION, NASAL VALVE (Bilateral)  APPLICATION, CARTILAGE GRAFT (Bilateral)  FESS, WITH NASAL SEPTOPLASTY (Bilateral)  EXCISION, NASAL TURBINATE, SUBMUCOSAL (Bilateral)    Final Anesthesia Type: general      Patient location during evaluation: PACU  Patient participation: Yes- Able to Participate  Level of consciousness: awake and alert  Post-procedure vital signs: reviewed and stable  Pain management: adequate  Airway patency: patent    PONV status at discharge: No PONV  Anesthetic complications: no      Cardiovascular status: blood pressure returned to baseline  Respiratory status: unassisted  Hydration status: euvolemic            Vitals Value Taken Time   /80 11/08/24 1317   Temp 37.1 °C (98.8 °F) 11/08/24 1200   Pulse 76 11/08/24 1329   Resp 18 11/08/24 1329   SpO2 94 % 11/08/24 1329   Vitals shown include unfiled device data.      Event Time   Out of Recovery 12:25:00         Pain/Alfredo Score: Pain Rating Prior to Med Admin: 7 (11/8/2024 12:23 PM)  Pain Rating Post Med Admin: 3 (11/8/2024 12:42 PM)  Alfredo Score: 10 (11/8/2024 12:23 PM)

## 2024-11-08 NOTE — OP NOTE
DATE OF PROCEDURE: 11/08/2024    SURGEON: Erwin Lima III, M.D.     ASSISTANT SURGEON: Francine Fritz MD PGY-3    PRE-OPERATIVE DIAGNOSIS:  1. Nasal Septal Deviation   2. Inferior Turbinate Hypertrophy   3. Internal Nasal Valve Collapse  4. Nasal Trauma    POST-OPERATIVE DIAGNOSIS:  1. Nasal Septal Deviation   2. Inferior Turbinate Hypertrophy   3. Internal Nasal Valve Collapse    4. Nasal Trauma    ANESTHESIA: General endotracheal anesthesia.    MEDS AND IV FLUIDS: Please see Anesthesia's report.     SPECIMENS:   None    ESTIMATED BLOOD LOSS: 50 cc     COMPLICATIONS: None apparent.       PROCEDURES PERFORMED:   1. Nasal reconstruction with left  grafts with osteotomies CPT 38238   2. Septoplasty, CPT 98483  3. Submucous resection of the turbinates, CPT 53983-81.   4. Septal Cartilage Graft CPT 55114        Saira Osuna presented to outpatient clinic for evaluation of nasal obstruction after sustaining trauma to the nose. On anterior rhinoscopy, patient was noted to have a severely deviated nasal septum to left with ~80 % obstruction. The risks, benefits, and alternatives to septoplasty with open reduction of nasal fracture and inferior turbinate reduction were explained to the patient in detail. Pt expressed understanding, and elected to proceed with the aforementioned procedure.       PROCEDURE IN DETAIL: The patient was identified in pre-operative holding using two patient specific identifiers. The procedure was discussed in detail, including all risks, benefits, and alternatives. All questions were answered to the patient's apparent satisfaction. Verbal and written consent were obtained. The patient was transported to the OR on a stretcher. General endotracheal anesthesia was induced per the anesthesia team without difficult. The head of the bed was rotated 90 degrees. The nasal dorsum, tip, ala, columella, septum, and inferior turbinates, were injected with lidocaine 1% with epinephrine 1:100,000 10  cc and the vibrisse were trimmed. Afrin pledgets were placed in the nasal cavities bilaterally. The patient was prepped and draped in the usual sterile fashion.     The patient was noted to have severe septal deviation and distortion to the left, completely obstructing the nasal cavity. A clement-transfixion incision was made in the septum on the left side. The mucoperichondrial/mucoperiosteal flap was raised beyond the bony/cartilagenous junction. The bony/cartilagenous junction was  with the caudal and the mucoperiosteal flap was raised on the opposite side. Baltazar scissors were used to separate the bony septum from the ethmoid plate. The bone was then removed with VarVee forceps. Further pieces of deviated bone were removed in a similar manner, careful to ensure a 1.5 cm caudal and dorsal strut. The clement-transfixion incision was closed with 5-0 chromic interrupted sutures. The septum coapted using the septal stapler. A submucosal resection of the bilateral inferior turbinates was performed using a microdebrider and the turbinates were outfractured using a Perez elevator.     Next, an inverted V incision was made in the columella and the skin and soft tissue envelope was raised from the cartilagenous framework of the nose. The mesial crura were  in the midline to expose the caudal septum. Mucoperichondrial flaps were raised from the cartilagenous and bony septum bilaterally.  Chromic sutures were place to create more tip support.      grafts were performed by  the upper lateral cartilages from the dorsal septum. The septal cartilage was then cut to fit the pockets and secured between the upper lateral cartilage and the dorsal septum with a 5-0 prolene interrupted stitch, opening the internal nasal valve. This was performed bilaterally.    A coapting stitch was placed with 4-0 chromic on a small Umang needle. The skin and soft tissue envelope was then replaced and the inverted V  incision was closed with 5-0 nylon interrupted sutures and 5-0 chromic sutures. Septal splits were placed bilaterally and secured with nylon. Surgicel, bacitracin ointment, and merocel were placed intranasally. Rolled telfa was placed in each nasal cavity and secured to each other externally with a nylon suture. A rigid cast was then placed on the nasal dorsum.     The procedure was then deemed complete without complication. The nasal cavities were widely patent bilaterally. The head of the bed was returned to anesthesia and the patient was extubated without difficulty. He was transported to the PACU for recovery.     Dr. Lima was present and scrubbed in for the entire case.

## 2024-11-08 NOTE — DISCHARGE INSTRUCTIONS
Keep nasal packing in until follow up appointment with Dr. Lima next week.  Use salt water spray over the nasal packing every 4 hours to prevent drying/crusting.  If having to change out the moustache dressing often because of bleeding, can also use oxymetazoline (Afrin) nasal spray over the nasal packing three times a day to slow down bleeding.  Do not use CPAP machine until nasal packing is removed.    DO NOT CALL [facility] ON CALL FOR POSTOPERATIVE PROBLEMS. CALL THE  [phone] AND ASK FOR ENT ON CALL.

## 2024-11-08 NOTE — H&P
Ms. Osuna          Vitals:     24 1040   BP: 120/78   Pulse: 68         Chief Complaint:  Other (Pt has states she has a deviated septum and wants to see if it could be corrected. Also has trouble breathing )        HPI:   is a 69-year-old white female who presents with complaints of nasal airway obstruction.  She states that she has had some nasal trauma from falls in the past though she did not seek medical treatment.  Significant past history is positive for a sinus surgery in  when she was in [place].  She denies any allergy symptomatology or recurrent sinus infections.  She has used Flonase in the past however she has not on this now.  She has not using any saline sinus rinses.     SNOT22- 42 NOSE- 90     Review of Systems:  Constitutional:   weight loss or weight gain: Negative  Allergy/Immunologic:   Negative  Nasal Congestion/Obstruction:   Positive as above  Nosebleeds:   Positive  Sinus infections:   Negative  Headache/Facial Pain:   Negative  Snoring/MONIKA:   Positive for snoring and sleep disturbance  Throat: Infections/Pain:   Negative  Hoarseness/Speech Disturbance:   Negative  Trauma Hx:  Negative     Cardiovascular:  M/I Angina: Negative  Hypertension: Negative  Endocrine:              DM/Steroids: Negative  GI:   Dysphagia/Reflux:  Reported positive for reflux disease  :   GYN Pregnancy: Negative  Renal:   Dialysis: Negative  Lymphatic:   Neck Mass/Lymphadenopathy: Negative  Muscoloskeletal:   Positive history of cervical fusion  Hematologic:   Bleeding Disorders/Anemia: Negative  Neurologic:    Cranial/Neuralgia: Negative  Pulmonary:   Asthma/SOB/Cough: Negative  Skin Disorders: Negative     Past Medical/Surgical/Family/Social History:     ENT Surgery:  Status post previous sinus surgery and tonsillectomy  Occupational Exposure: Negative   Problems: Negative  Cancer: Negative     Past Family History:              Family history of Cancer: Negative     Past Social History:               Tobacco:  Former smoker who quit in 1990              Alcohol:  Once monthly Social Drinker        Allergies and medications: Reviewed per med card.     Physical Examination:  Ears:              External auditory canals:  Clear              Hearing: Grossly intact              Tympanic Membranes: Clear  Nose:              External:  Dorsal deviation to the right with left internal valve collapse and positive Prasanna maneuver.              Intranasal:  Marked septal deviation to the left with 2+ turbinates.  All of these deformities together creating 80% obstruction.  Mouth:              Intraorally: Lips, teeth, and gums: Normal              Oropharynx: Normal              Mucosa: Normal              Tongue: Normal  Throat:                            Palate: Normal palate with elevation              Tonsils:  Absent              Posterior Pharynx: Normal  Fiberoptic exam: Not performed  Head/Face:      Inspection: Normal and atraumatic              Palpation/Percussion: Non tender              Facial strength: Normal and symmetric              Salivary glands: Normal  Neck: Supple  Thyroid: No masses  Lymphatics: No nodes  Respiratory:              Effort: Normal  Eyes:              Ocular Mobility: Normal              Vision: Grossly intact  Neuro/Psych:              Cranial Nerves: Grossly Intact              Orientation: Normal              Mood/Affect: Normal        Assessment/Plan:  I have discussed my findings with her in detail as well as my recommendations for treatment.  I have recommended that she begin daily saline sinus rinses and I have reviewed how this is to be administered with her in detail.  I have also suggested a trial of nasal steroid sprays such as Flonase or Nasacort and I have described how this is to be administered as well.  Surgically we discussed that she could benefit from nasal reconstruction with possible osteotomies, left  graft, septoplasty, submucous resection of  turbinates.  Have discussed the pros and cons risks and benefits as well as technical aspects of this procedure in detail with her.  She understands and accepts these and wishes to proceed with scheduling.  I will send her for medical photographs as well.      11/8/2024: Presents today for surgery. Patient seen and examined. There have been no significant interval changes to the history or physical examination as noted above. To OR today for SEPTOPLASTY, NOSE, WITH NASAL TURBINATE REDUCTION (Bilateral), RECONSTRUCTION, NASAL VALVE (Bilateral), APPLICATION, CARTILAGE GRAFT (Bilateral)    Francine Fritz MD  Otolaryngology - Head and Neck Surgery, PGY-3

## 2024-11-08 NOTE — PLAN OF CARE
Patient on phone with daughter for ride home.  Daughter in [place] at the hospital ER.  Attempting to find other means of transportation home. Patient is stable and ready for discharge

## 2024-11-08 NOTE — ANESTHESIA PROCEDURE NOTES
Intubation    Date/Time: 11/8/2024 10:02 AM    Performed by: Domenico Tabares CRNA  Authorized by: Cyrus Shook MD    Intubation:     Induction:  Intravenous    Intubated:  Postinduction    Mask Ventilation:  Easy mask    Attempts:  1    Attempted By:  Student    Method of Intubation:  Video laryngoscopy    Blade:  Segundo 3    Laryngeal View Grade: Grade I - full view of cords      Difficult Airway Encountered?: No      Complications:  None    Airway Device:  Oral yosi    Airway Device Size:  7.0    Style/Cuff Inflation:  Cuffed (inflated to minimal occlusive pressure)    Secured at:  The lips    Placement Verified By:  Capnometry    Complicating Factors:  None    Findings Post-Intubation:  BS equal bilateral and atraumatic/condition of teeth unchanged

## 2024-11-14 ENCOUNTER — OFFICE VISIT (OUTPATIENT)
Dept: OTOLARYNGOLOGY | Facility: CLINIC | Age: 69
End: 2024-11-14
Payer: OTHER GOVERNMENT

## 2024-11-14 VITALS
BODY MASS INDEX: 20.32 KG/M2 | DIASTOLIC BLOOD PRESSURE: 72 MMHG | HEART RATE: 72 BPM | SYSTOLIC BLOOD PRESSURE: 106 MMHG | WEIGHT: 104.06 LBS

## 2024-11-14 DIAGNOSIS — J34.829 NASAL VALVE COLLAPSE: Primary | ICD-10-CM

## 2024-11-14 DIAGNOSIS — J34.2 NASAL SEPTAL DEVIATION: ICD-10-CM

## 2024-11-14 DIAGNOSIS — Z98.890 POST-OPERATIVE STATE: ICD-10-CM

## 2024-11-14 PROCEDURE — 99024 POSTOP FOLLOW-UP VISIT: CPT | Mod: ,,, | Performed by: OTOLARYNGOLOGY

## 2024-11-14 PROCEDURE — 99214 OFFICE O/P EST MOD 30 MIN: CPT | Mod: PBBFAC | Performed by: OTOLARYNGOLOGY

## 2024-11-14 PROCEDURE — 99999 PR PBB SHADOW E&M-EST. PATIENT-LVL IV: CPT | Mod: PBBFAC,,, | Performed by: OTOLARYNGOLOGY

## 2024-11-14 NOTE — PROGRESS NOTES
One week S/P nasal reconstruction with left  graft and osteotomies,septo,turbs.  All packs,splints,and sutures removed.  Healing well,septum and nose straight,airway clear.  Reviewed post op instructions including massages and nasal saline sprays.  RTC 3 weeks or p.r.n. sooner.

## 2024-11-20 ENCOUNTER — PATIENT MESSAGE (OUTPATIENT)
Dept: OTOLARYNGOLOGY | Facility: CLINIC | Age: 69
End: 2024-11-20
Payer: OTHER GOVERNMENT

## 2024-12-05 ENCOUNTER — OFFICE VISIT (OUTPATIENT)
Dept: OTOLARYNGOLOGY | Facility: CLINIC | Age: 69
End: 2024-12-05
Payer: OTHER GOVERNMENT

## 2024-12-05 VITALS
WEIGHT: 106.25 LBS | SYSTOLIC BLOOD PRESSURE: 103 MMHG | HEIGHT: 60 IN | DIASTOLIC BLOOD PRESSURE: 67 MMHG | BODY MASS INDEX: 20.86 KG/M2 | HEART RATE: 75 BPM

## 2024-12-05 DIAGNOSIS — Z98.890 POST-OPERATIVE STATE: ICD-10-CM

## 2024-12-05 DIAGNOSIS — J34.829 NASAL VALVE COLLAPSE: Primary | ICD-10-CM

## 2024-12-05 DIAGNOSIS — J34.2 NASAL SEPTAL DEVIATION: ICD-10-CM

## 2024-12-05 PROCEDURE — 99999 PR PBB SHADOW E&M-EST. PATIENT-LVL IV: CPT | Mod: PBBFAC,,, | Performed by: OTOLARYNGOLOGY

## 2024-12-05 PROCEDURE — 99024 POSTOP FOLLOW-UP VISIT: CPT | Mod: ,,, | Performed by: OTOLARYNGOLOGY

## 2024-12-05 PROCEDURE — 99214 OFFICE O/P EST MOD 30 MIN: CPT | Mod: PBBFAC | Performed by: OTOLARYNGOLOGY

## 2024-12-05 NOTE — PROGRESS NOTES
One month S/P nasal reconstruction with left  graft and osteotomies,septo,turbs.  She is quite happy with her improved nasal airflow.  Healing well,septum and nose straight, valves well-supported and airway clear.  She does have some slightly dry mucosa apparent on her anterior nasal septum.  Reviewed post op instructions including massages and increasing nasal saline sprays.  RTC 2 months or p.r.n. sooner.

## 2025-02-05 ENCOUNTER — PATIENT MESSAGE (OUTPATIENT)
Dept: OTOLARYNGOLOGY | Facility: CLINIC | Age: 70
End: 2025-02-05
Payer: OTHER GOVERNMENT

## (undated) DEVICE — PAD PREP CUFFED NS 24X48IN

## (undated) DEVICE — TOWEL OR DISP STRL BLUE 4/PK

## (undated) DEVICE — SUT MCRYL PLUS 4-0 PS2 27IN

## (undated) DEVICE — TUBING SUC UNIV W/CONN 12FT

## (undated) DEVICE — SUT SILK 6-0 BLK BR P-1 P-1

## (undated) DEVICE — BOWL STERILE LARGE 32OZ

## (undated) DEVICE — BLADE INFERIOR TURBINATE 2MM

## (undated) DEVICE — SPONGE GELFOAM 12-7MM

## (undated) DEVICE — DRAPE STERI INSTRUMENT 1018

## (undated) DEVICE — DRAPE STERI-DRAPE 1000 17X11IN

## (undated) DEVICE — TRAY CYSTO BASIN OMC

## (undated) DEVICE — MARKER SKIN FINE TIP

## (undated) DEVICE — DRAPE THREE-QTR REINF 53X77IN

## (undated) DEVICE — DRESSING TELFA STRL 4X3 LF

## (undated) DEVICE — RETRIEVER SUTURE HEWSON DISP

## (undated) DEVICE — DRAPE MINI C-ARM

## (undated) DEVICE — NDL HYPO A BEVEL 30X1/2

## (undated) DEVICE — TOWEL OR XRAY BLUE 17X26IN

## (undated) DEVICE — HEMOSTAT SURGICEL 2X3IN

## (undated) DEVICE — SUT 5-0 MONO P-3 18IN

## (undated) DEVICE — PAD CAST 2 IN X 4YDS STERILE

## (undated) DEVICE — SPLINT PLASTER F.S 4INX15IN

## (undated) DEVICE — DRESSING TRANS 2X2 TEGADERM

## (undated) DEVICE — PAD CAST SPECIALIST STRL 4

## (undated) DEVICE — SUT ETHIBOND 3-0 RB1 30IN

## (undated) DEVICE — BLADE SURG #15 CARBON STEEL

## (undated) DEVICE — GLOVE BIOGEL PI MICRO SZ 7.5

## (undated) DEVICE — PENCIL ELECTROSURG HOLST W/BLD

## (undated) DEVICE — TUBE FRAZIER 5MM 2FT SOFT TIP

## (undated) DEVICE — APPLICATOR STERILE 6IN 100/CA

## (undated) DEVICE — SUT ETHILON 4-0 PS2 18 BLK

## (undated) DEVICE — COVER LIGHT HANDLE 80/CA

## (undated) DEVICE — SPLINT REUTER BIVALVE 0.5MM

## (undated) DEVICE — Device

## (undated) DEVICE — GOWN ECLIPSE REINF LVL4 TWL XL

## (undated) DEVICE — STAPLER SKIN REGULAR

## (undated) DEVICE — SOL IRR SOD CHL .9% POUR

## (undated) DEVICE — SUT VICRYL CTD 6-0 S-29 12

## (undated) DEVICE — BANDAGE MATRIX HK LOOP 4IN 5YD

## (undated) DEVICE — SUT 4/0 18IN ETHILON BL P3

## (undated) DEVICE — DRAPE HALF SURGICAL 40X58IN

## (undated) DEVICE — SLING ARM SMALL FOAM STRAP

## (undated) DEVICE — ELECTRODE REM PLYHSV RETURN 9

## (undated) DEVICE — GOWN POLY REINF BRTH SLV XL

## (undated) DEVICE — SUT 6/0 18IN COATED VICRYL

## (undated) DEVICE — CORD FOR BIPOLAR FORCEPS 12

## (undated) DEVICE — SUT PROLENE 6-0 P-1 18

## (undated) DEVICE — SPONGE PATTY SURGICAL .5X3IN

## (undated) DEVICE — TRAY MUSCLE LID EYE

## (undated) DEVICE — BLADE SURGICAL 15C

## (undated) DEVICE — GOWN SURGICAL X-LARGE

## (undated) DEVICE — SOL BETADINE 5%

## (undated) DEVICE — GLOVE BIOGEL SKINSENSE PI 7.5

## (undated) DEVICE — DRESSING XEROFORM NONADH 1X8IN

## (undated) DEVICE — PENCIL ROCKER SWITCH 10FT CORD

## (undated) DEVICE — DRESSING TELFA N ADH 3X8

## (undated) DEVICE — TRAY ENT 4/CS

## (undated) DEVICE — SPONGE GAUZE 4X4 12 PLY STRL

## (undated) DEVICE — DRAPE TOP 53X102IN

## (undated) DEVICE — CONTAINER SPECIMEN OR STER 4OZ

## (undated) DEVICE — SUT 6/0 18IN PLAIN GUT D/A

## (undated) DEVICE — SUT ETHILON 6-0 P-1 18

## (undated) DEVICE — ELECTRODE NEEDLE 1IN

## (undated) DEVICE — DRAPE STRABISMUS STRL 40X48IN

## (undated) DEVICE — GLOVE BIOGEL ECLIPSE SZ 7.5

## (undated) DEVICE — DRAPE EENT SPLIT STERILE

## (undated) DEVICE — BLADE SAGITTAL FINE 5.5 X 18.5

## (undated) DEVICE — BANDAGE MATRIX HK LOOP 2IN 5YD

## (undated) DEVICE — SPONGE COTTON TRAY 4X4IN

## (undated) DEVICE — SUT SILK 6-0 TG140-8 18IN

## (undated) DEVICE — DRAPE U SPLIT SHEET 54X76IN

## (undated) DEVICE — STAPLER SEPTAL ENTACT

## (undated) DEVICE — SUT ETHILON 5-0 PL BLK MONO

## (undated) DEVICE — SUT VICRYL 4-0 RB1 27IN UD

## (undated) DEVICE — FORCEP CURVED DISP

## (undated) DEVICE — ADHESIVE MASTISOL VIAL 48/BX

## (undated) DEVICE — SUT 5-0 CHROMIC GUT / P-3

## (undated) DEVICE — MARKER FN REG DUAL UTIL RULER

## (undated) DEVICE — DRAPE C-ARM MINI DISP

## (undated) DEVICE — TOURNIQUET SB QC DP 18X4IN

## (undated) DEVICE — CORD BIPOLAR 12 FOOT

## (undated) DEVICE — DRAPE INSTR MAGNETIC 10X16IN

## (undated) DEVICE — ELECTRODE NEEDLE 2.8IN

## (undated) DEVICE — COVER CAMERA OPERATING ROOM

## (undated) DEVICE — UNDERGLOVES BIOGEL PI SIZE 8

## (undated) DEVICE — SUT 6/0 18IN PLAIN GUT G-1